# Patient Record
Sex: FEMALE | Race: BLACK OR AFRICAN AMERICAN | Employment: OTHER | URBAN - METROPOLITAN AREA
[De-identification: names, ages, dates, MRNs, and addresses within clinical notes are randomized per-mention and may not be internally consistent; named-entity substitution may affect disease eponyms.]

---

## 2017-01-17 ENCOUNTER — OFFICE VISIT (OUTPATIENT)
Dept: INTERNAL MEDICINE CLINIC | Age: 82
End: 2017-01-17

## 2017-01-17 VITALS
HEIGHT: 62 IN | TEMPERATURE: 97.4 F | BODY MASS INDEX: 27.33 KG/M2 | WEIGHT: 148.5 LBS | OXYGEN SATURATION: 98 % | SYSTOLIC BLOOD PRESSURE: 102 MMHG | HEART RATE: 92 BPM | DIASTOLIC BLOOD PRESSURE: 58 MMHG | RESPIRATION RATE: 20 BRPM

## 2017-01-17 DIAGNOSIS — J44.9 CHRONIC OBSTRUCTIVE PULMONARY DISEASE, UNSPECIFIED COPD TYPE (HCC): ICD-10-CM

## 2017-01-17 DIAGNOSIS — I63.30 CEREBRAL THROMBOSIS WITH CEREBRAL INFARCTION (HCC): ICD-10-CM

## 2017-01-17 DIAGNOSIS — F41.1 GAD (GENERALIZED ANXIETY DISORDER): Primary | ICD-10-CM

## 2017-01-17 DIAGNOSIS — G10 HEREDITARY CHOREA (HCC): ICD-10-CM

## 2017-01-17 RX ORDER — ASPIRIN 81 MG/1
81 TABLET ORAL DAILY
Qty: 30 TAB | Refills: 12 | Status: SHIPPED | OUTPATIENT
Start: 2017-01-17

## 2017-01-17 RX ORDER — CITALOPRAM 10 MG/1
10 TABLET ORAL DAILY
Qty: 30 TAB | Refills: 3
Start: 2017-01-17 | End: 2017-05-30 | Stop reason: DRUGHIGH

## 2017-01-17 NOTE — LETTER
1/17/2017 1:43 PM 
 
Ms. Sheila Barnhartata [address] [facility] [phone] To Whom It May Concern Sheila Rivera was unable to travel during the period of 12/21/2016 thru 12/28/2016 due to her recent hospitalization and illness. Sincerely, Krystal Ruvalcaba MD

## 2017-01-17 NOTE — PROGRESS NOTES
Health Maintenance Due   Topic Date Due    DTaP/Tdap/Td series (1 - Tdap) 10/21/1951    Pneumococcal 65+ Low/Medium Risk (2 of 2 - PPSV23) 06/16/2011       Chief Complaint   Patient presents with    Follow-up     2 month   [address] for UTI    Cholesterol Problem    COPD    Tremors    Fatigue     over a period of time per spouse       1. Have you been to the ER, urgent care clinic since your last visit? Hospitalized since your last visit? Yes When: 11/2016 Where: [facility]) Reason for visit: UTI    2. Have you seen or consulted any other health care providers outside of the [facility] since your last visit? Include any pap smears or colon screening. No    3) Do you have an Advance Directive on file? meagan      4) Are you interested in receiving information on Advance Directives? NO      Patient is accompanied by daughter I have received verbal consent from Rik Capone to discuss any/all medical information while they are present in the room.

## 2017-01-17 NOTE — PATIENT INSTRUCTIONS

## 2017-01-17 NOTE — MR AVS SNAPSHOT
Visit Information Date & Time Provider Department Dept. Phone Encounter #  
 1/17/2017  1:15 PM Elayne Soulier, MD [facility] Internal Medicine [employer] [id number] Your Appointments 2/2/2017  3:40 PM  
Follow Up with Stephen Solis DO [facility] at [address] [facility]) Appt Note: 6 week f/u abnormal movements $0CP OCTAVIO 12/6/16  
 [address] [place] [address] [facility] [address]  
[phone]  
  
   
 [address] [facility]  [address] Upcoming Health Maintenance Date Due DTaP/Tdap/Td series (1 - Tdap) 10/21/1951 Pneumococcal 65+ Low/Medium Risk (2 of 2 - PPSV23) 6/16/2011 GLAUCOMA SCREENING Q2Y 4/9/2017 MEDICARE YEARLY EXAM 4/22/2017 Allergies as of 1/17/2017  Review Complete On: 1/17/2017 By: Elayne Soulier, MD  
  
 Severity Noted Reaction Type Reactions Keflex [Cephalexin]  06/16/2011   Topical Rash Current Immunizations  Reviewed on 6/16/2011 Name Date Influenza Vaccine Split 9/16/2010 Pneumococcal Vaccine (Unspecified Type) 6/16/2006 Not reviewed this visit You Were Diagnosed With   
  
 Codes Comments HANNA (generalized anxiety disorder)    -  Primary ICD-10-CM: F41.1 ICD-9-CM: 300.02 Hereditary chorea (HCC)     ICD-10-CM: G10 
ICD-9-CM: 333.4 Chronic obstructive pulmonary disease, unspecified COPD type ([facility]     ICD-10-CM: J44.9 ICD-9-CM: 635 Cerebral thrombosis with cerebral infarction [facility])     ICD-10-CM: I63.30 ICD-9-CM: 434.01 Vitals BP Pulse Temp Resp Height(growth percentile) Weight(growth percentile) 102/58 (BP 1 Location: Left arm, BP Patient Position: Sitting) 92 97.4 °F (36.3 °C) (Oral) 20 5' 2\" (1.575 m) 148 lb 8 oz (67.4 kg) SpO2 BMI OB Status Smoking Status 98% 27.16 kg/m2 Postmenopausal Former Smoker Vitals History BMI and BSA Data Body Mass Index Body Surface Area 27.16 kg/m 2 1.72 m 2 Preferred Pharmacy Pharmacy Name Phone [facility]. [phone] Your Updated Medication List  
  
   
This list is accurate as of: 1/17/17  1:56 PM.  Always use your most recent med list.  
  
  
  
  
 aspirin delayed-release 81 mg tablet Commonly known as:  ECOTRIN LOW STRENGTH Take 1 Tab by mouth daily. calcium citrate 200 mg (950 mg) tablet Take  by mouth daily. calcium citrate-vitamin d3 315-200 mg-unit Tab Commonly known as:  CITRACAL+D Take 1 Tab by mouth daily (with breakfast). citalopram 10 mg tablet Commonly known as:  Hussein Narrow Take 1 Tab by mouth daily. clonazePAM 0.5 mg tablet Commonly known as:  Kayla Poornima Take 1 Tab by mouth two (2) times daily as needed. Max Daily Amount: 1 mg. Prescriptions Sent to Pharmacy Refills  
 aspirin delayed-release (ECOTRIN LOW STRENGTH) 81 mg tablet 12 Sig: Take 1 Tab by mouth daily. Class: Normal  
 Pharmacy: [address]  #: [phone] Route: Oral  
  
To-Do List   
 01/26/2017 4:00 PM  
  Appointment with [facility] 1 at [address] [place] [address] ([phone]) Please, no calcium supplements or antacids that coat the stomach (ex: Tums, Mylanta) 24 hours prior to procedure. Maintain normal diet and medications. Dairy products are allowed. Wear an outfit with an elastic waistband (no zipper or metal snaps). Check in at registration 15min before your appointment time unless you were instructed to do otherwise. Patient Instructions Anxiety Disorder: Care Instructions Your Care Instructions Anxiety is a normal reaction to stress. Difficult situations can cause you to have symptoms such as sweaty palms and a nervous feeling. In an anxiety disorder, the symptoms are far more severe.  Constant worry, muscle tension, trouble sleeping, nausea and diarrhea, and other symptoms can make normal daily activities difficult or impossible. These symptoms may occur for no reason, and they can affect your work, school, or social life. Medicines, counseling, and self-care can all help. Follow-up care is a key part of your treatment and safety. Be sure to make and go to all appointments, and call your doctor if you are having problems. It's also a good idea to know your test results and keep a list of the medicines you take. How can you care for yourself at home? · Take medicines exactly as directed. Call your doctor if you think you are having a problem with your medicine. · Go to your counseling sessions and follow-up appointments. · Recognize and accept your anxiety. Then, when you are in a situation that makes you anxious, say to yourself, \"This is not an emergency. I feel uncomfortable, but I am not in danger. I can keep going even if I feel anxious. \" · Be kind to your body: ¨ Relieve tension with exercise or a massage. ¨ Get enough rest. 
¨ Avoid alcohol, caffeine, nicotine, and illegal drugs. They can increase your anxiety level and cause sleep problems. ¨ Learn and do relaxation techniques. See below for more about these techniques. · Engage your mind. Get out and do something you enjoy. Go to a funny movie, or take a walk or hike. Plan your day. Having too much or too little to do can make you anxious. · Keep a record of your symptoms. Discuss your fears with a good friend or family member, or join a support group for people with similar problems. Talking to others sometimes relieves stress. · Get involved in social groups, or volunteer to help others. Being alone sometimes makes things seem worse than they are. · Get at least 30 minutes of exercise on most days of the week to relieve stress. Walking is a good choice.  You also may want to do other activities, such as running, swimming, cycling, or playing tennis or team sports. Relaxation techniques Do relaxation exercises 10 to 20 minutes a day. You can play soothing, relaxing music while you do them, if you wish. · Tell others in your house that you are going to do your relaxation exercises. Ask them not to disturb you. · Find a comfortable place, away from all distractions and noise. · Lie down on your back, or sit with your back straight. · Focus on your breathing. Make it slow and steady. · Breathe in through your nose. Breathe out through either your nose or mouth. · Breathe deeply, filling up the area between your navel and your rib cage. Breathe so that your belly goes up and down. · Do not hold your breath. · Breathe like this for 5 to 10 minutes. Notice the feeling of calmness throughout your whole body. As you continue to breathe slowly and deeply, relax by doing the following for another 5 to 10 minutes: · Tighten and relax each muscle group in your body. You can begin at your toes and work your way up to your head. · Imagine your muscle groups relaxing and becoming heavy. · Empty your mind of all thoughts. · Let yourself relax more and more deeply. · Become aware of the state of calmness that surrounds you. · When your relaxation time is over, you can bring yourself back to alertness by moving your fingers and toes and then your hands and feet and then stretching and moving your entire body. Sometimes people fall asleep during relaxation, but they usually wake up shortly afterward. · Always give yourself time to return to full alertness before you drive a car or do anything that might cause an accident if you are not fully alert. Never play a relaxation tape while you drive a car. When should you call for help? Call 911 anytime you think you may need emergency care. For example, call if: 
· You feel you cannot stop from hurting yourself or someone else. Keep the numbers for these national suicide hotlines: 0-463-658-TALK (6-513.558.7259) and 7-470-BHWZAMH (4-698.440.6279). If you or someone you know talks about suicide or feeling hopeless, get help right away. Watch closely for changes in your health, and be sure to contact your doctor if: 
· You have anxiety or fear that affects your life. · You have symptoms of anxiety that are new or different from those you had before. Where can you learn more? Go to http://izabella-dylan.info/. Enter P754 in the search box to learn more about \"Anxiety Disorder: Care Instructions. \" Current as of: July 26, 2016 Content Version: 11.1 © 2608-7907 [employer]. Care instructions adapted under license by [employer] (which disclaims liability or warranty for this information). If you have questions about a medical condition or this instruction, always ask your healthcare professional. [place] [address] any warranty or liability for your use of this information. Introducing [facility] [place] & HEALTH SERVICES! [facility] introduces [employer] patient portal. Now you can access parts of your medical record, email your doctor's office, and request medication refills online. 1. In your internet browser, go to [email or web address] 2. Click on the First Time User? Click Here link in the Sign In box. You will see the New Member Sign Up page. 3. Enter your [employer] Access Code exactly as it appears below. You will not need to use this code after youve completed the sign-up process. If you do not sign up before the expiration date, you must request a new code. · [employer] Access Code: [email or web address] Expires: 1/25/2017  2:41 PM 
 
4. Enter the last four digits of your Social Security Number (xxxx) and Date of Birth (mm/dd/yyyy) as indicated and click Submit. You will be taken to the next sign-up page. 5. Create a [employer] ID. This will be your [employer] login ID and cannot be changed, so think of one that is secure and easy to remember. 6. Create a [employer] password. You can change your password at any time. 7. Enter your Password Reset Question and Answer. This can be used at a later time if you forget your password. 8. Enter your e-mail address. You will receive e-mail notification when new information is available in [address] 9. Click Sign Up. You can now view and download portions of your medical record. 10. Click the Download Summary menu link to download a portable copy of your medical information. If you have questions, please visit the Frequently Asked Questions section of the [employer] website. Remember, [employer] is NOT to be used for urgent needs. For medical emergencies, dial 911. Now available from your iPhone and Android! Please provide this summary of care documentation to your next provider. Your primary care clinician is listed as Sigrid Del Rosario. If you have any questions after today's visit, please call [phone].

## 2017-01-17 NOTE — PROGRESS NOTES
HISTORY OF PRESENT ILLNESS  Mikey Louis is a 80 y.o. female here accompanied with her  and daughter. Still remains anxious. did not start taking celexa because of possible side effects. She has seen neurologist,was diagnosed with chorea and was adv to have klonopin. She is not taking it either. Had stroke in past.not taking ASA. all albs reviewed. She is post menopausal.not on calcium. Follow-up     Hospital Follow Up     Cholesterol Problem     COPD     Tremors     Fatigue         Review of Systems   Constitutional: Positive for fatigue. HENT: Negative. Eyes: Negative. Respiratory: Negative. Cardiovascular: Negative. Gastrointestinal: Negative. Musculoskeletal: Negative. Skin: Negative. Neurological: Positive for tremors. Negative for speech change, focal weakness, seizures and loss of consciousness. Psychiatric/Behavioral: The patient is nervous/anxious. Physical Exam   Constitutional: She appears well-developed and well-nourished. No distress. Neck: Normal range of motion. Neck supple. No JVD present. No thyromegaly present. Cardiovascular: Normal rate, regular rhythm, normal heart sounds and intact distal pulses. Pulmonary/Chest: Effort normal and breath sounds normal. No respiratory distress. She has no wheezes. She has no rales. Psychiatric: Her behavior is normal. Judgment and thought content normal.   Anxious profoundly       ASSESSMENT and Melba Berman was seen today for follow-up, hospital follow up, cholesterol problem, copd, tremors and fatigue. Diagnoses and all orders for this visit:    HANNA (generalized anxiety disorder)    Did not start taking celexa. Reassured and adv her to start taking it. Will order,  -     citalopram (CELEXA) 10 mg tablet; Take 1 Tab by mouth daily. Hereditary chorea ([facility]    Has seen . was adv to take klonopin. She is not taking it.     Chronic obstructive pulmonary disease, unspecified COPD type ([address]    Stable. off inhaler. Cerebral thrombosis with cerebral infarction ([address]    Will start,  -     aspirin delayed-release (ECOTRIN LOW STRENGTH) 81 mg tablet; Take 1 Tab by mouth daily. Discussed expected course/resolution/complications of diagnosis in detail with patient. Medication risks/benefits/costs/interactions/alternatives discussed with patient. Pt was given an after visit summary which includes diagnoses, current medications & vitals. Pt expressed understanding with the diagnosis and plan.

## 2017-01-18 ENCOUNTER — TELEPHONE (OUTPATIENT)
Dept: INTERNAL MEDICINE CLINIC | Age: 82
End: 2017-01-18

## 2017-01-18 NOTE — TELEPHONE ENCOUNTER
Patient  Christell Heron called has a question about a test that his wife need to get done he can be reached at   [phone]

## 2017-01-26 ENCOUNTER — HOSPITAL ENCOUNTER (OUTPATIENT)
Dept: MAMMOGRAPHY | Age: 82
Discharge: HOME | End: 2017-01-26
Attending: INTERNAL MEDICINE
Payer: MEDICARE

## 2017-01-26 DIAGNOSIS — Z78.0 POST-MENOPAUSE: ICD-10-CM

## 2017-01-26 PROCEDURE — 77080 DXA BONE DENSITY AXIAL: CPT

## 2017-02-02 NOTE — PROGRESS NOTES
Osteopenia. Adv to have caltrate,oscal or citracal  600 mg po BID and have more milk product in diet.

## 2017-03-21 ENCOUNTER — OFFICE VISIT (OUTPATIENT)
Dept: INTERNAL MEDICINE CLINIC | Age: 82
End: 2017-03-21

## 2017-03-21 VITALS
WEIGHT: 146 LBS | DIASTOLIC BLOOD PRESSURE: 54 MMHG | RESPIRATION RATE: 20 BRPM | TEMPERATURE: 98.2 F | HEART RATE: 93 BPM | OXYGEN SATURATION: 94 % | SYSTOLIC BLOOD PRESSURE: 100 MMHG | HEIGHT: 62 IN | BODY MASS INDEX: 26.87 KG/M2

## 2017-03-21 DIAGNOSIS — F41.9 ANXIETY DISORDER, UNSPECIFIED TYPE: ICD-10-CM

## 2017-03-21 DIAGNOSIS — E55.9 VITAMIN D DEFICIENCY: ICD-10-CM

## 2017-03-21 DIAGNOSIS — I63.30 CEREBRAL THROMBOSIS WITH CEREBRAL INFARCTION (HCC): ICD-10-CM

## 2017-03-21 DIAGNOSIS — G10 HEREDITARY CHOREA (HCC): Primary | ICD-10-CM

## 2017-03-21 DIAGNOSIS — M85.80 OSTEOPENIA: ICD-10-CM

## 2017-03-21 RX ORDER — CLONAZEPAM 0.5 MG/1
0.5 TABLET ORAL
Qty: 60 TAB | Refills: 2 | Status: SHIPPED | OUTPATIENT
Start: 2017-03-21 | End: 2017-05-30 | Stop reason: SDUPTHER

## 2017-03-21 NOTE — MR AVS SNAPSHOT
Visit Information Date & Time Provider Department Dept. Phone Encounter #  
 3/21/2017  1:15 PM Ximena Whiteside MD [facility] Internal Medicine [facility] [phone] [id number] Upcoming Health Maintenance Date Due DTaP/Tdap/Td series (1 - Tdap) 10/21/1951 Pneumococcal 65+ Low/Medium Risk (2 of 2 - PPSV23) 6/16/2011 GLAUCOMA SCREENING Q2Y 4/9/2017 MEDICARE YEARLY EXAM 4/22/2017 Allergies as of 3/21/2017  Review Complete On: 3/21/2017 By: Ximena Whiteside MD  
  
 Severity Noted Reaction Type Reactions Keflex [Cephalexin]  06/16/2011   Topical Rash Current Immunizations  Reviewed on 6/16/2011 Name Date Influenza Vaccine Split 9/16/2010 Pneumococcal Vaccine (Unspecified Type) 6/16/2006 Not reviewed this visit You Were Diagnosed With   
  
 Codes Comments Hereditary chorea ([facility]    -  Primary ICD-10-CM: G10 
ICD-9-CM: 333.4 Cerebral thrombosis with cerebral infarction [facility])     ICD-10-CM: I63.30 ICD-9-CM: 434.01 Anxiety disorder, unspecified type     ICD-10-CM: F41.9 ICD-9-CM: 300.00 Vitamin D deficiency     ICD-10-CM: E55.9 ICD-9-CM: 268.9 Osteopenia     ICD-10-CM: M85.80 ICD-9-CM: 733.90 Vitals BP Pulse Temp Resp Height(growth percentile) Weight(growth percentile) 100/54 (BP 1 Location: Left arm, BP Patient Position: Sitting) 93 98.2 °F (36.8 °C) (Oral) 20 5' 2\" (1.575 m) 146 lb (66.2 kg) SpO2 BMI OB Status Smoking Status 94% 26.7 kg/m2 Postmenopausal Former Smoker Vitals History BMI and BSA Data Body Mass Index Body Surface Area  
 26.7 kg/m 2 1.7 m 2 Preferred Pharmacy Pharmacy Name Phone [facility]. [phone] Your Updated Medication List  
  
   
This list is accurate as of: 3/21/17  1:53 PM.  Always use your most recent med list.  
  
  
  
  
 aspirin delayed-release 81 mg tablet Commonly known as:  ECOTRIN LOW STRENGTH Take 1 Tab by mouth daily. calcium citrate 200 mg (950 mg) tablet Take  by mouth daily. calcium citrate-vitamin d3 315-200 mg-unit Tab Commonly known as:  CITRACAL+D Take 1 Tab by mouth daily (with breakfast). citalopram 10 mg tablet Commonly known as:  Elda Moer Take 1 Tab by mouth daily. clonazePAM 0.5 mg tablet Commonly known as:  Bertram Broqueta Take 1 Tab by mouth two (2) times daily as needed. Max Daily Amount: 1 mg. Prescriptions Printed Refills  
 clonazePAM (KLONOPIN) 0.5 mg tablet 2 Sig: Take 1 Tab by mouth two (2) times daily as needed. Max Daily Amount: 1 mg. Class: Print Route: Oral  
  
We Performed the Following CBC WITH AUTOMATED DIFF [44324 CPT(R)] METABOLIC PANEL, COMPREHENSIVE [62007 CPT(R)] VITAMIN D, 25 HYDROXY [id number] CPT(R)] Introducing [facility] [place] & HEALTH SERVICES! Kelle Hoffmann introduces [employer] patient portal. Now you can access parts of your medical record, email your doctor's office, and request medication refills online. 1. In your internet browser, go to [email or web address] 2. Click on the First Time User? Click Here link in the Sign In box. You will see the New Member Sign Up page. 3. Enter your [employer] Access Code exactly as it appears below. You will not need to use this code after youve completed the sign-up process. If you do not sign up before the expiration date, you must request a new code. · [employer] Access Code: [email or web address] Expires: 4/26/2017  4:38 PM 
 
4. Enter the last four digits of your Social Security Number (xxxx) and Date of Birth (mm/dd/yyyy) as indicated and click Submit. You will be taken to the next sign-up page. 5. Create a 5i Sciencest ID. This will be your [employer] login ID and cannot be changed, so think of one that is secure and easy to remember. 6. Create a 5i [employer]est password. You can change your password at any time. 7. Enter your Password Reset Question and Answer. This can be used at a later time if you forget your password. 8. Enter your e-mail address. You will receive e-mail notification when new information is available in [address] 9. Click Sign Up. You can now view and download portions of your medical record. 10. Click the Download Summary menu link to download a portable copy of your medical information. If you have questions, please visit the Frequently Asked Questions section of the [employer] website. Remember, [employer] is NOT to be used for urgent needs. For medical emergencies, dial 911. Now available from your iPhone and Android! Please provide this summary of care documentation to your next provider. Your primary care clinician is listed as Geovanny Ba. If you have any questions after today's visit, please call [phone].

## 2017-03-21 NOTE — PROGRESS NOTES
HISTORY OF PRESENT ILLNESS  Sheila Rivera is a 80 y.o. female here accompanied with her  and daughter. Her chorea is worse. was prescribed klonopin,not taking it. Anxiety is better controlled. on celexa. She is post menopausal. on calcium. dexa scan shows osteopenia. Need lab work. Follow-up     Malaise     Tremors     COPD     Cholesterol Problem         Review of Systems   Constitutional: Negative. HENT: Negative. Eyes: Negative. Respiratory: Negative. Cardiovascular: Negative. Gastrointestinal: Negative. Musculoskeletal: Negative. Skin: Negative. Neurological: Positive for tremors. Negative for speech change, focal weakness, seizures and loss of consciousness. Psychiatric/Behavioral: The patient is nervous/anxious. Physical Exam   Constitutional: She appears well-developed and well-nourished. No distress. HENT:   Head: Normocephalic and atraumatic. Right Ear: External ear normal.   Left Ear: External ear normal.   Nose: Nose normal.   Mouth/Throat: Oropharynx is clear and moist. No oropharyngeal exudate. Neck: Normal range of motion. Neck supple. No JVD present. No thyromegaly present. Cardiovascular: Normal rate, regular rhythm, normal heart sounds and intact distal pulses. Pulmonary/Chest: Effort normal and breath sounds normal. No respiratory distress. She has no wheezes. She has no rales. Abdominal: Soft. Bowel sounds are normal. She exhibits no distension. There is no tenderness. Neurological: She is alert. Coordination abnormal.   Abnormal movement or arms and legs. No shuffling gait,no intention tremor. Psychiatric: Her behavior is normal. Judgment and thought content normal.   Anxiety is better. ASSESSMENT and PLAN  Tulio Ruaon was seen today for follow-up, malaise, tremors, copd and cholesterol problem. Diagnoses and all orders for this visit:    Hereditary chorea ([address]  Worse.   Did not try med.will call in,  -     clonazePAM [facility] 0.5 mg tablet; Take 1 Tab by mouth two (2) times daily as needed. Max Daily Amount: 1 mg. Cerebral thrombosis with cerebral infarction (HCC)    On asa.  -     CBC WITH AUTOMATED DIFF  -     METABOLIC PANEL, COMPREHENSIVE    Anxiety disorder, unspecified type    celexa is helping her. Vitamin D deficiency  -     VITAMIN D, 25 HYDROXY    Osteopenia    On calcium. dexa scan show osteopenia. Discussed expected course/resolution/complications of diagnosis in detail with patient. Medication risks/benefits/costs/interactions/alternatives discussed with patient. Pt was given an after visit summary which includes diagnoses, current medications & vitals. Pt expressed understanding with the diagnosis and plan.

## 2017-03-21 NOTE — PROGRESS NOTES
Health Maintenance Due   Topic Date Due    DTaP/Tdap/Td series (1 - Tdap) 10/21/1951    Pneumococcal 65+ Low/Medium Risk (2 of 2 - PPSV23) 06/16/2011    GLAUCOMA SCREENING Q2Y  04/09/2017       Chief Complaint   Patient presents with    Follow-up     2 month    Malaise     states no energy, having episodes of dizziness with 1 fall    Tremors    COPD    Cholesterol Problem       1. Have you been to the ER, urgent care clinic since your last visit? Hospitalized since your last visit? No    2. Have you seen or consulted any other health care providers outside of the [address] [place] [address] since your last visit? Include any pap smears or colon screening. No    3) Do you have an Advance Directive on file? no    4) Are you interested in receiving information on Advance Directives? NO      Patient is accompanied by self I have received verbal consent from Abbey Escobedo to discuss any/all medical information while they are present in the room.

## 2017-03-21 NOTE — LETTER
3/28/2017 12:41 PM 
 
Ms. Angie Carvalho [address] [id number] Dear Angie Manuel: Please find your most recent results below. Resulted Orders CBC WITH AUTOMATED DIFF Result Value Ref Range WBC 7.6 3.4 - 10.8 x10E3/uL  
 RBC 4.39 3.77 - 5.28 x10E6/uL HGB 12.4 11.1 - 15.9 g/dL HCT 38.1 34.0 - 46.6 % MCV 87 79 - 97 fL  
 MCH 28.2 26.6 - 33.0 pg  
 MCHC 32.5 31.5 - 35.7 g/dL  
 RDW 15.8 (H) 12.3 - 15.4 % PLATELET 303 171 - 865 x10E3/uL NEUTROPHILS 67 % Lymphocytes 21 % MONOCYTES 11 % EOSINOPHILS 1 % BASOPHILS 0 %  
 ABS. NEUTROPHILS 5.1 1.4 - 7.0 x10E3/uL Abs Lymphocytes 1.6 0.7 - 3.1 x10E3/uL  
 ABS. MONOCYTES 0.8 0.1 - 0.9 x10E3/uL  
 ABS. EOSINOPHILS 0.1 0.0 - 0.4 x10E3/uL  
 ABS. BASOPHILS 0.0 0.0 - 0.2 x10E3/uL IMMATURE GRANULOCYTES 0 %  
 ABS. IMM. GRANS. 0.0 0.0 - 0.1 x10E3/uL Narrative Performed at:  [address]  [id number] : Beatris Cam MD, Phone:  [phone] VITAMIN D, 25 HYDROXY Result Value Ref Range VITAMIN D, 25-HYDROXY 21.7 (L) 30.0 - 100.0 ng/mL Comment:  
   Vitamin D deficiency has been defined by the [facility] practice guideline as a 
level of serum 25-OH vitamin D less than 20 ng/mL (1,2). The Endocrine Society went on to further define vitamin D 
insufficiency as a level between 21 and 29 ng/mL (2). 1. IOM ([place] of [employer]). 2010. Dietary reference 
   intakes for calcium and D. [facility]: The 
   Presentigo. 2. Bong MF, Misti NC, John CRANE, et al. 
   Evaluation, treatment, and prevention of vitamin D 
   deficiency: an Endocrine Society clinical practice 
   guideline. JCEM. 2011 Jul; 96(7):1911-30. Narrative Performed at:  [address]  [id number] : Beatris Cam MD, Phone:  [phone] METABOLIC PANEL, COMPREHENSIVE Result Value Ref Range Glucose 97 65 - 99 mg/dL BUN 17 8 - 27 mg/dL Creatinine 0.88 0.57 - 1.00 mg/dL GFR est non-AA 60 >59 mL/min/1.73 GFR est AA 69 >59 mL/min/1.73  
 BUN/Creatinine ratio 19 11 - 26 Sodium 139 134 - 144 mmol/L Potassium 4.5 3.5 - 5.2 mmol/L Chloride 101 96 - 106 mmol/L  
 CO2 23 18 - 29 mmol/L Calcium 8.8 8.7 - 10.3 mg/dL Protein, total 6.3 6.0 - 8.5 g/dL Albumin 3.8 3.5 - 4.7 g/dL GLOBULIN, TOTAL 2.5 1.5 - 4.5 g/dL A-G Ratio 1.5 1.2 - 2.2 Comment: **Please note reference interval change** Bilirubin, total 0.5 0.0 - 1.2 mg/dL Alk. phosphatase 70 39 - 117 IU/L  
 AST (SGOT) 11 0 - 40 IU/L  
 ALT (SGPT) 7 0 - 32 IU/L Narrative Performed at:  [address]  [id number] : Berta Duval MD, Phone:  [phone] RECOMMENDATIONS: 
Pepper Reil your labs indicate that Your Vitamin D level is low, start taking OTC Vitamin D 1000 units 1 x a day for 4 months. Also increase your consumption of milk and exposure to the sun for 20 minutes a day. We will recheck your Vitamin D level in 4 months All other labs are stable Please call me if you have any questions: [phone] Sincerely, Yuni White MD

## 2017-03-22 LAB
25(OH)D3+25(OH)D2 SERPL-MCNC: 21.7 NG/ML (ref 30–100)
ALBUMIN SERPL-MCNC: 3.8 G/DL (ref 3.5–4.7)
ALBUMIN/GLOB SERPL: 1.5 {RATIO} (ref 1.2–2.2)
ALP SERPL-CCNC: 70 IU/L (ref 39–117)
ALT SERPL-CCNC: 7 IU/L (ref 0–32)
AST SERPL-CCNC: 11 IU/L (ref 0–40)
BASOPHILS # BLD AUTO: 0 X10E3/UL (ref 0–0.2)
BASOPHILS NFR BLD AUTO: 0 %
BILIRUB SERPL-MCNC: 0.5 MG/DL (ref 0–1.2)
BUN SERPL-MCNC: 17 MG/DL (ref 8–27)
BUN/CREAT SERPL: 19 (ref 11–26)
CALCIUM SERPL-MCNC: 8.8 MG/DL (ref 8.7–10.3)
CHLORIDE SERPL-SCNC: 101 MMOL/L (ref 96–106)
CO2 SERPL-SCNC: 23 MMOL/L (ref 18–29)
CREAT SERPL-MCNC: 0.88 MG/DL (ref 0.57–1)
EOSINOPHIL # BLD AUTO: 0.1 X10E3/UL (ref 0–0.4)
EOSINOPHIL NFR BLD AUTO: 1 %
ERYTHROCYTE [DISTWIDTH] IN BLOOD BY AUTOMATED COUNT: 15.8 % (ref 12.3–15.4)
GLOBULIN SER CALC-MCNC: 2.5 G/DL (ref 1.5–4.5)
GLUCOSE SERPL-MCNC: 97 MG/DL (ref 65–99)
HCT VFR BLD AUTO: 38.1 % (ref 34–46.6)
HGB BLD-MCNC: 12.4 G/DL (ref 11.1–15.9)
IMM GRANULOCYTES # BLD: 0 X10E3/UL (ref 0–0.1)
IMM GRANULOCYTES NFR BLD: 0 %
LYMPHOCYTES # BLD AUTO: 1.6 X10E3/UL (ref 0.7–3.1)
LYMPHOCYTES NFR BLD AUTO: 21 %
MCH RBC QN AUTO: 28.2 PG (ref 26.6–33)
MCHC RBC AUTO-ENTMCNC: 32.5 G/DL (ref 31.5–35.7)
MCV RBC AUTO: 87 FL (ref 79–97)
MONOCYTES # BLD AUTO: 0.8 X10E3/UL (ref 0.1–0.9)
MONOCYTES NFR BLD AUTO: 11 %
NEUTROPHILS # BLD AUTO: 5.1 X10E3/UL (ref 1.4–7)
NEUTROPHILS NFR BLD AUTO: 67 %
PLATELET # BLD AUTO: 249 X10E3/UL (ref 150–379)
POTASSIUM SERPL-SCNC: 4.5 MMOL/L (ref 3.5–5.2)
PROT SERPL-MCNC: 6.3 G/DL (ref 6–8.5)
RBC # BLD AUTO: 4.39 X10E6/UL (ref 3.77–5.28)
SODIUM SERPL-SCNC: 139 MMOL/L (ref 134–144)
WBC # BLD AUTO: 7.6 X10E3/UL (ref 3.4–10.8)

## 2017-04-07 ENCOUNTER — OFFICE VISIT (OUTPATIENT)
Dept: INTERNAL MEDICINE CLINIC | Age: 82
End: 2017-04-07

## 2017-04-07 VITALS
OXYGEN SATURATION: 93 % | HEIGHT: 62 IN | RESPIRATION RATE: 24 BRPM | TEMPERATURE: 95.9 F | SYSTOLIC BLOOD PRESSURE: 112 MMHG | DIASTOLIC BLOOD PRESSURE: 65 MMHG | BODY MASS INDEX: 27.49 KG/M2 | HEART RATE: 100 BPM | WEIGHT: 149.4 LBS

## 2017-04-07 DIAGNOSIS — G25.5 CHOREA: Primary | ICD-10-CM
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
## 2017-04-07 DIAGNOSIS — N39.0 URINARY TRACT INFECTION WITHOUT HEMATURIA, SITE UNSPECIFIED: ICD-10-CM

## 2017-04-07 LAB
BILIRUB UR QL STRIP: NEGATIVE
GLUCOSE UR-MCNC: NEGATIVE MG/DL
KETONES P FAST UR STRIP-MCNC: NEGATIVE MG/DL
PH UR STRIP: 5 [PH] (ref 4.6–8)
PROT UR QL STRIP: NEGATIVE MG/DL
SP GR UR STRIP: 1.03 (ref 1–1.03)
UA UROBILINOGEN AMB POC: NORMAL (ref 0.2–1)
URINALYSIS CLARITY POC: NORMAL
URINALYSIS COLOR POC: NORMAL
URINE BLOOD POC: NEGATIVE
URINE LEUKOCYTES POC: NEGATIVE
URINE NITRITES POC: NEGATIVE

## 2017-04-07 RX ORDER — SULFAMETHOXAZOLE AND TRIMETHOPRIM 400; 80 MG/1; MG/1
1 TABLET ORAL 2 TIMES DAILY
Qty: 14 TAB | Refills: 0 | Status: SHIPPED | OUTPATIENT
Start: 2017-04-07 | End: 2017-05-30

## 2017-04-07 NOTE — PATIENT INSTRUCTIONS
Urinary Tract Infection in Women: Care Instructions  Your Care Instructions    A urinary tract infection, or UTI, is a general term for an infection anywhere between the kidneys and the urethra (where urine comes out). Most UTIs are bladder infections. They often cause pain or burning when you urinate. UTIs are caused by bacteria and can be cured with antibiotics. Be sure to complete your treatment so that the infection goes away. Follow-up care is a key part of your treatment and safety. Be sure to make and go to all appointments, and call your doctor if you are having problems. It's also a good idea to know your test results and keep a list of the medicines you take. How can you care for yourself at home? · Take your antibiotics as directed. Do not stop taking them just because you feel better. You need to take the full course of antibiotics. · Drink extra water and other fluids for the next day or two. This may help wash out the bacteria that are causing the infection. (If you have kidney, heart, or liver disease and have to limit fluids, talk with your doctor before you increase your fluid intake.)  · Avoid drinks that are carbonated or have caffeine. They can irritate the bladder. · Urinate often. Try to empty your bladder each time. · To relieve pain, take a hot bath or lay a heating pad set on low over your lower belly or genital area. Never go to sleep with a heating pad in place. To prevent UTIs  · Drink plenty of water each day. This helps you urinate often, which clears bacteria from your system. (If you have kidney, heart, or liver disease and have to limit fluids, talk with your doctor before you increase your fluid intake.)  · Urinate when you need to. · Urinate right after you have sex. · Change sanitary pads often. · Avoid douches, bubble baths, feminine hygiene sprays, and other feminine hygiene products that have deodorants.   · After going to the bathroom, wipe from front to back.  When should you call for help? Call your doctor now or seek immediate medical care if:  · Symptoms such as fever, chills, nausea, or vomiting get worse or appear for the first time. · You have new pain in your back just below your rib cage. This is called flank pain. · There is new blood or pus in your urine. · You have any problems with your antibiotic medicine. Watch closely for changes in your health, and be sure to contact your doctor if:  · You are not getting better after taking an antibiotic for 2 days. · Your symptoms go away but then come back. Where can you learn more? Go to http://izabella-dylan.info/. Enter U761 in the search box to learn more about \"Urinary Tract Infection in Women: Care Instructions. \"  Current as of: November 28, 2016  Content Version: 11.2  © 4971-0075 [employer]. Care instructions adapted under license by [employer] (which disclaims liability or warranty for this information). If you have questions about a medical condition or this instruction, always ask your healthcare professional. [address] any warranty or liability for your use of this information.

## 2017-04-07 NOTE — PROGRESS NOTES
HISTORY OF PRESENT ILLNESS  Cynthia Wiley is a 80 y.o. female. This is a patient of Dr. Anayeli Nguyen who presents today with her  with complaints of falls. The patient states she was previously seen by neurology related to chorea. She was recommended to take Clonazepam 0.5 mg bid. Patient states she did not initially start this medication due to concern over possible side effects. However, following visit with Dr. Anayeli Nguyen, she was again recommended to try the medication. Patient states she took 1/2 tab bid x 1 week. Medication did seem to help with chorea markedly; however, she experienced nearly daily falls in which she felt as though her legs \"came out from under\" her. She denies injury with falls. The patient states she did not take the medication x 2 days and has not had falls during that time period. The patient denies chest pain, shortness of breath, dizziness, fatigue, and GI/ problems. Visit Vitals    /65 (BP 1 Location: Right arm, BP Patient Position: Sitting)    Pulse 100    Temp 95.9 °F (35.5 °C) (Oral)    Resp 24    Ht 5' 2.01\" (1.575 m)    Wt 149 lb 6.4 oz (67.8 kg)    SpO2 93%    BMI 27.32 kg/m2     HPI    Review of Systems   Constitutional: Negative for chills and fever. HENT: Negative for congestion. Eyes: Negative for blurred vision and pain. Respiratory: Negative for shortness of breath and wheezing. Cardiovascular: Negative for chest pain and leg swelling. Gastrointestinal: Negative for abdominal pain. Genitourinary: Negative. Musculoskeletal: Positive for falls. Skin: Negative. Neurological: Negative for dizziness, sensory change, speech change, focal weakness, seizures, loss of consciousness and headaches. Endo/Heme/Allergies: Negative. Psychiatric/Behavioral: Negative. Physical Exam   Constitutional: She is oriented to person, place, and time. She appears well-developed and well-nourished. No distress.    Ambulating with cane   HENT: Head: Normocephalic and atraumatic. Eyes: Conjunctivae are normal. Pupils are equal, round, and reactive to light. Neck: Neck supple. Cardiovascular: Normal rate, regular rhythm and normal heart sounds. Pulmonary/Chest: Effort normal and breath sounds normal. No respiratory distress. She has no wheezes. She has no rales. Musculoskeletal: She exhibits no edema. Neurological: She is alert and oriented to person, place, and time. Involuntary movement of legs bilaterally    Skin: Skin is warm and dry. Psychiatric: She has a normal mood and affect. Nursing note and vitals reviewed. ASSESSMENT and PLAN    ICD-10-CM ICD-9-CM    1. 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 Notify of any further falls. Discussed medication and possible side effects in detail. Do not take in combination with other medications/substances that can cause drowsiness. 2. Falls, initial encounter W19. Serge Mouse X819.7 Fall precautions. In office, AMB POC URINALYSIS DIP STICK AUTO W/ MICRO- large leukocytes   3. Urinary tract infection without hematuria, site unspecified N39.0 599.0 Will order  trimethoprim-sulfamethoxazole (BACTRIM, SEPTRA)  mg per tablet, 1 tab po bid x 3 days      Will order  CULTURE, URINE     Lab results and schedule of future lab studies reviewed with patient  Reviewed diet, exercise and weight control  Reviewed medications and side effects in detail  Patient encouraged to call or return to office if symptoms do not improve or worsen. Reviewed plan of care with patient who acknowledges understanding and agrees. Follow-up with Dr. Otto Holden in 2 weeks, or sooner as needed.

## 2017-05-22 RX ORDER — FLUTICASONE FUROATE AND VILANTEROL 100; 25 UG/1; UG/1
1 POWDER RESPIRATORY (INHALATION) DAILY
Qty: 1 INHALER | Refills: 5 | Status: SHIPPED | OUTPATIENT
Start: 2017-05-22 | End: 2018-02-13 | Stop reason: SDUPTHER

## 2017-05-30 ENCOUNTER — OFFICE VISIT (OUTPATIENT)
Dept: INTERNAL MEDICINE CLINIC | Age: 82
End: 2017-05-30

## 2017-05-30 VITALS
WEIGHT: 151.5 LBS | DIASTOLIC BLOOD PRESSURE: 70 MMHG | HEART RATE: 94 BPM | BODY MASS INDEX: 27.88 KG/M2 | OXYGEN SATURATION: 97 % | TEMPERATURE: 97.6 F | HEIGHT: 62 IN | RESPIRATION RATE: 20 BRPM | SYSTOLIC BLOOD PRESSURE: 120 MMHG

## 2017-05-30 DIAGNOSIS — G10 HEREDITARY CHOREA (HCC): ICD-10-CM

## 2017-05-30 DIAGNOSIS — G89.29 CHRONIC RIGHT SHOULDER PAIN: Primary | ICD-10-CM

## 2017-05-30 DIAGNOSIS — F41.9 ANXIETY: ICD-10-CM

## 2017-05-30 DIAGNOSIS — R26.9 GAIT ABNORMALITY: ICD-10-CM

## 2017-05-30 DIAGNOSIS — M25.511 CHRONIC RIGHT SHOULDER PAIN: Primary | ICD-10-CM

## 2017-05-30 RX ORDER — CITALOPRAM 20 MG/1
20 TABLET, FILM COATED ORAL DAILY
Qty: 30 TAB | Refills: 6 | Status: SHIPPED | OUTPATIENT
Start: 2017-05-30 | End: 2018-09-11

## 2017-05-30 RX ORDER — OLOPATADINE HYDROCHLORIDE 2 MG/ML
SOLUTION/ DROPS OPHTHALMIC
Refills: 4 | COMMUNITY
Start: 2017-05-05 | End: 2018-09-11

## 2017-05-30 RX ORDER — CLONAZEPAM 0.5 MG/1
TABLET ORAL
Qty: 45 TAB | Refills: 2 | Status: SHIPPED | OUTPATIENT
Start: 2017-05-30 | End: 2017-09-07 | Stop reason: SDUPTHER

## 2017-05-30 NOTE — PROGRESS NOTES
HISTORY OF PRESENT ILLNESS  Judah Post is a 80 y.o. female here with C/O right shoulder pain on and off for past several weeks. no fall or injury. also notice to have gait problem. She is not feeling good. her daughter is going to have a big eye surgery soon. she is anxious. Her anxiety is not controlled either. on celexa. 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 Labs are reviewed. stable. Shoulder Pain      Excessive Sweating     Anxiety         Review of Systems   HENT: Negative. Eyes: Negative. Respiratory: Negative. Cardiovascular: Negative. Gastrointestinal: Negative. Musculoskeletal: Positive for joint pain. Negative for falls. Skin: Negative. Neurological: Negative. Negative for loss of consciousness. Psychiatric/Behavioral: The patient is nervous/anxious. Physical Exam   Constitutional: She appears well-developed and well-nourished. No distress. HENT:   Mouth/Throat: Oropharyngeal exudate present. Neck: Normal range of motion. Neck supple. No JVD present. No thyromegaly present. Cardiovascular: Normal rate, regular rhythm, normal heart sounds and intact distal pulses. Pulmonary/Chest: Effort normal and breath sounds normal. No respiratory distress. She has no wheezes. She has no rales. Musculoskeletal: She exhibits tenderness. Right shoulder:tender. ROm OK   Neurological: Coordination abnormal.   Abnormal movement or arms and legs. No shuffling gait,no intention tremor. Psychiatric: Her behavior is normal. Judgment and thought content normal.   Anxious profoundly       ASSESSMENT and Lena Heritage was seen today for shoulder pain, excessive sweating and anxiety. Diagnoses and all orders for this visit:    Chronic right shoulder pain    Probable DJD. Will refer,  -     REFERRAL TO HOME HEALTH    Gait abnormality  Will get H/H for gait and balance. Anxiety    Not controlled. Will increase,  -     citalopram (CELEXA) 20 mg tablet;  Take 1 Tab by mouth daily. D/C celexa 10 mg    Hereditary chorea (HCC)    Klonopin only at night which helps her a lot. will add half tablet in morning too. Will give,  -     clonazePAM (KLONOPIN) 0.5 mg tablet; Take half tablet in AM and 1 tab at PM        Discussed expected course/resolution/complications of diagnosis in detail with patient. Medication risks/benefits/costs/interactions/alternatives discussed with patient. Pt was given an after visit summary which includes diagnoses, current medications & vitals. Pt expressed understanding with the diagnosis and plan.

## 2017-05-30 NOTE — PROGRESS NOTES
Health Maintenance Due   Topic Date Due    DTaP/Tdap/Td series (1 - Tdap) 10/21/1951    Pneumococcal 65+ Low/Medium Risk (2 of 2 - PPSV23) 06/16/2011    GLAUCOMA SCREENING Q2Y  04/09/2017    MEDICARE YEARLY EXAM  04/22/2017       Chief Complaint   Patient presents with    Shoulder Pain     right shoulder    Excessive Sweating     states at times she feels sweaty and was concerned       1. Have you been to the ER, urgent care clinic since your last visit? Hospitalized since your last visit? No    2. Have you seen or consulted any other health care providers outside of the [address] [place] [address] since your last visit? Include any pap smears or colon screening. No    3) Do you have an Advance Directive on file? no    4) Are you interested in receiving information on Advance Directives? NO      Patient is accompanied by self I have received verbal consent from Kranthi Burris to discuss any/all medical information while they are present in the room.

## 2017-05-30 NOTE — MR AVS SNAPSHOT
Visit Information Date & Time Provider Department Dept. Phone Encounter #  
 5/30/2017  1:30 PM Jose Davis MD [facility] Internal Medicine [facility] [phone] [id number] Upcoming Health Maintenance Date Due DTaP/Tdap/Td series (1 - Tdap) 10/21/1951 Pneumococcal 65+ Low/Medium Risk (2 of 2 - PPSV23) 6/16/2011 GLAUCOMA SCREENING Q2Y 4/9/2017 MEDICARE YEARLY EXAM 4/22/2017 INFLUENZA AGE 9 TO ADULT 8/1/2017 Allergies as of 5/30/2017  Review Complete On: 5/30/2017 By: Jose Davis MD  
  
 Severity Noted Reaction Type Reactions Keflex [Cephalexin]  06/16/2011   Topical Rash Current Immunizations  Reviewed on 5/30/2017 Name Date Influenza Vaccine Split 9/16/2010 Pneumococcal Vaccine (Unspecified Type) 6/16/2006 Reviewed by Jose Davis MD on 5/30/2017 at  2:27 PM  
You Were Diagnosed With   
  
 Codes Comments Chronic right shoulder pain    -  Primary ICD-10-CM: M25.511, G89.29 ICD-9-CM: 719.41, 338.29 Gait abnormality     ICD-10-CM: R26.9 ICD-9-CM: 008. 2 Anxiety     ICD-10-CM: F41.9 ICD-9-CM: 300.00 Hereditary chorea (HCC)     ICD-10-CM: G10 
ICD-9-CM: 333.4 Vitals BP Pulse Temp Resp Height(growth percentile) Weight(growth percentile) 120/70 (BP 1 Location: Left arm, BP Patient Position: Sitting) 94 97.6 °F (36.4 °C) (Oral) 20 5' 2\" (1.575 m) 151 lb 8 oz (68.7 kg) SpO2 BMI OB Status Smoking Status 97% 27.71 kg/m2 Postmenopausal Former Smoker Vitals History BMI and BSA Data Body Mass Index Body Surface Area  
 27.71 kg/m 2 1.73 m 2 Preferred Pharmacy Pharmacy Name Phone [facility] [phone] Your Updated Medication List  
  
   
This list is accurate as of: 5/30/17  2:31 PM.  Always use your most recent med list.  
  
  
  
  
 aspirin delayed-release 81 mg tablet Commonly known as:  ECOTRIN LOW STRENGTH  
 Take 1 Tab by mouth daily. calcium citrate-vitamin d3 315-200 mg-unit Tab Commonly known as:  CITRACAL+D Take 1 Tab by mouth daily (with breakfast). citalopram 20 mg tablet Commonly known as:  Garon Eng Take 1 Tab by mouth daily. D/C celexa 10 mg  
  
 clonazePAM 0.5 mg tablet Commonly known as:  Ardie Ender Take half tablet in AM and 1 tab at PM  
  
 fluticasone-vilanterol 100-25 mcg/dose inhaler Commonly known as:  BREO ELLIPTA Take 1 Puff by inhalation daily. PATADAY 0.2 % Drop ophthalmic solution Generic drug:  olopatadine Prescriptions Printed Refills  
 clonazePAM (KLONOPIN) 0.5 mg tablet 2 Sig: Take half tablet in AM and 1 tab at PM  
 Class: Print Prescriptions Sent to Pharmacy Refills  
 citalopram (CELEXA) 20 mg tablet 6 Sig: Take 1 Tab by mouth daily. D/C celexa 10 mg  
 Class: Normal  
 Pharmacy: [address] [place] [address]  Ph #: [phone] Route: Oral  
  
We Performed the Following [address] [place] [address] Comments:  
 Please evaluate patient for gait and balance and shoulder pain on right Referral Information Referral ID Referred By Referred To  
  
 [id number] Annabella Son Not Available Visits Status Start Date End Date 1 New Request 5/30/17 5/30/18 If your referral has a status of pending review or denied, additional information will be sent to support the outcome of this decision. Introducing [facility]! Nazario Dao introduces [employer] patient portal. Now you can access parts of your medical record, email your doctor's office, and request medication refills online. 1. In your internet browser, go to [email or web address] 2. Click on the First Time User? Click Here link in the Sign In box. You will see the New Member Sign Up page. 3. Enter your [employer] Access Code exactly as it appears below.  You will not need to use this code after youve completed the sign-up process. If you do not sign up before the expiration date, you must request a new code. · BOND Access Code: [email or web address] Expires: 8/28/2017  2:31 PM 
 
4. Enter the last four digits of your Social Security Number (xxxx) and Date of Birth (mm/dd/yyyy) as indicated and click Submit. You will be taken to the next sign-up page. 5. Create a BOND ID. This will be your BOND login ID and cannot be changed, so think of one that is secure and easy to remember. 6. Create a BOND password. You can change your password at any time. 7. Enter your Password Reset Question and Answer. This can be used at a later time if you forget your password. 8. Enter your e-mail address. You will receive e-mail notification when new information is available in [address] 9. Click Sign Up. You can now view and download portions of your medical record. 10. Click the Download Summary menu link to download a portable copy of your medical information. If you have questions, please visit the Frequently Asked Questions section of the BOND website. Remember, BOND is NOT to be used for urgent needs. For medical emergencies, dial 911. Now available from your iPhone and Android! Please provide this summary of care documentation to your next provider. Your primary care clinician is listed as Camden Chao. If you have any questions after today's visit, please call [phone].

## 2017-06-01 ENCOUNTER — TELEPHONE (OUTPATIENT)
Dept: INTERNAL MEDICINE CLINIC | Age: 82
End: 2017-06-01

## 2017-06-15 ENCOUNTER — TELEPHONE (OUTPATIENT)
Dept: INTERNAL MEDICINE CLINIC | Age: 82
End: 2017-06-15

## 2017-06-15 DIAGNOSIS — L50.9 URTICARIA: Primary | ICD-10-CM

## 2017-06-15 RX ORDER — AZELASTINE HYDROCHLORIDE 0.5 MG/ML
1 SOLUTION/ DROPS OPHTHALMIC 2 TIMES DAILY
Qty: 6 ML | Refills: 1 | Status: SHIPPED | OUTPATIENT
Start: 2017-06-15 | End: 2018-09-11 | Stop reason: SDUPTHER

## 2017-06-15 NOTE — PROGRESS NOTES
Requested Prescriptions     Signed Prescriptions Disp Refills    azelastine (OPTIVAR) 0.05 % ophthalmic solution 6 mL 1     Sig: Administer 1 Drop to right eye two (2) times a day. Use in affected eye(s)    PerDr. Rosanne New, verbal order received.

## 2017-06-15 NOTE — TELEPHONE ENCOUNTER
Call placed to patient, denies drainage from eye, just red and irritated looking.  Conferred with Dr Delicia Tucker, verbal order for optivar eye gtts bid, advised pt and voiced understanding and advised that if sx continue to come by [facility] office on Tuesday, voiced understanding

## 2017-06-15 NOTE — TELEPHONE ENCOUNTER
----- Message from Janice Mendez sent at 6/15/2017 11:04 AM EDT -----  Regarding: Dr. Gregory Pedraza, Pt's  called regarding pt right eye is very red and irritated. He would like a Rx sent to the pharmacy on file. Best contact is [phone].

## 2017-06-20 ENCOUNTER — OFFICE VISIT (OUTPATIENT)
Dept: INTERNAL MEDICINE CLINIC | Age: 82
End: 2017-06-20

## 2017-06-20 VITALS
BODY MASS INDEX: 28.52 KG/M2 | DIASTOLIC BLOOD PRESSURE: 68 MMHG | HEART RATE: 94 BPM | WEIGHT: 155 LBS | HEIGHT: 62 IN | TEMPERATURE: 97.9 F | RESPIRATION RATE: 20 BRPM | OXYGEN SATURATION: 99 % | SYSTOLIC BLOOD PRESSURE: 124 MMHG

## 2017-06-20 DIAGNOSIS — Z00.00 MEDICARE ANNUAL WELLNESS VISIT, SUBSEQUENT: ICD-10-CM

## 2017-06-20 DIAGNOSIS — F02.80 LATE ONSET ALZHEIMER'S DISEASE WITHOUT BEHAVIORAL DISTURBANCE (HCC): ICD-10-CM

## 2017-06-20 DIAGNOSIS — G30.1 LATE ONSET ALZHEIMER'S DISEASE WITHOUT BEHAVIORAL DISTURBANCE (HCC): ICD-10-CM

## 2017-06-20 DIAGNOSIS — Z23 ENCOUNTER FOR IMMUNIZATION: ICD-10-CM

## 2017-06-20 DIAGNOSIS — G10 HEREDITARY CHOREA (HCC): ICD-10-CM

## 2017-06-20 DIAGNOSIS — R41.0 CONFUSION: Primary | ICD-10-CM

## 2017-06-20 DIAGNOSIS — F41.8 ANXIETY WITH DEPRESSION: ICD-10-CM

## 2017-06-20 RX ORDER — MEMANTINE HYDROCHLORIDE 10 MG/1
10 TABLET ORAL DAILY
Qty: 30 TAB | Refills: 5 | Status: SHIPPED | OUTPATIENT
Start: 2017-06-20 | End: 2017-07-25 | Stop reason: SDUPTHER

## 2017-06-20 NOTE — PROGRESS NOTES
Emerald Lindsay is a 80 y.o. female and presents for annual Medicare Wellness Visit. Problem List: Reviewed with patient and discussed risk factors. Patient Active Problem List   Diagnosis Code    Cerebral thrombosis with cerebral infarction ([facility] [address] I63.30    COPD (chronic obstructive pulmonary disease) ([facility]) J44.9    Chronic respiratory failure with hypoxia ([facility]) J96.11    Sinus tachycardia R00.0    S/P knee replacement Z96.659    Obstructive sleep apnea G47.33    Dyslipidemia E78.5    Debility R53.81    Nausea and vomiting R11.2    UTI (urinary tract infection) N39.0    Hereditary chorea ([facility] [address] G10    Late onset Alzheimer's disease without behavioral disturbance G30.1, F02.80       Current medical providers:  Patient Care Team:  Evin Angel MD as PCP - General (Internal Medicine)  Jayleen Alas LPN as Ambulatory Care Navigator (Internal Medicine)    PSH: Reviewed with patient  Past Surgical History:   Procedure Laterality Date    ABDOMEN SURGERY [address] UNLISTED      hernia repair    HX GYN  1963    c section    HX HEENT      status post T&A    HX OOPHORECTOMY      HX ORTHOPAEDIC      status post left total knee replacement        SH: Reviewed with patient  Social History   Substance Use Topics    Smoking status: Former Smoker     Packs/day: 1.00     Years: 40.00     Quit date: 6/16/1982    Smokeless tobacco: Never Used    Alcohol use No       FH: Reviewed with patient  Family History   Problem Relation Age of Onset    Hypertension Maternal Grandmother     Hypertension Maternal Grandfather     Hypertension Paternal Grandmother     Hypertension Paternal Grandfather     MS Daughter        Medications/Allergies: Reviewed with patient  Current Outpatient Prescriptions on File Prior to Visit   Medication Sig Dispense Refill    azelastine (OPTIVAR) 0.05 % ophthalmic solution Administer 1 Drop to right eye two (2) times a day.  Use in affected eye(s) 6 mL 1    PATADAY 0.2 % drop ophthalmic solution   4    citalopram (CELEXA) 20 mg tablet Take 1 Tab by mouth daily. D/C celexa 10 mg 30 Tab 6    clonazePAM (KLONOPIN) 0.5 mg tablet Take half tablet in AM and 1 tab at PM 45 Tab 2    fluticasone-vilanterol (BREO ELLIPTA) 100-25 mcg/dose inhaler Take 1 Puff by inhalation daily. 1 Inhaler 5    aspirin delayed-release (ECOTRIN LOW STRENGTH) 81 mg tablet Take 1 Tab by mouth daily. 30 Tab 12    calcium citrate-vitamin d3 (CITRACAL+D) 315-200 mg-unit tab Take 1 Tab by mouth daily (with breakfast). 30 Tab 12     No current facility-administered medications on file prior to visit. Allergies   Allergen Reactions    Keflex [Cephalexin] Rash       Objective:  Visit Vitals    /68 (BP 1 Location: Left arm, BP Patient Position: Sitting)    Pulse 94    Temp 97.9 °F (36.6 °C) (Oral)    Resp 20    Ht 5' 2\" (1.575 m)    Wt 155 lb (70.3 kg)    SpO2 99%    BMI 28.35 kg/m2    Body mass index is 28.35 kg/(m^2). Assessment of cognitive impairment: Alert and oriented x 2    Depression Screen:   PHQ over the last two weeks 6/20/2017   Little interest or pleasure in doing things Not at all   Feeling down, depressed or hopeless Not at all   Total Score PHQ 2 0       Fall Risk Assessment:    Fall Risk Assessment, last 12 mths 6/20/2017   Able to walk? Yes   Fall in past 12 months? No       Functional Ability:   Does the patient exhibit a steady gait?  no   How long did it take the patient to get up and walk from a sitting position? 2 min   Is the patient self reliant?  (ie can do own laundry, meals, household chores)  no     Does the patient handle his/her own medications?  no     Does the patient handle his/her own money? no     Is the patients home safe (ie good lighting, handrails on stairs and bath, etc.)? no     Did you notice or did patient express any hearing difficulties?    yes     Did you notice or did patient express any vision difficulties?   no     Were distance and reading eye charts used? no       Advance Care Planning:   Patient was offered the opportunity to discuss advance care planning:  no     Does patient have an Advance Directive:  yes   If no, did you provide information on Caring Connections?  no       Plan:      Orders Placed This Encounter    pneumococcal 13 naren conj dip (PREVNAR-13) 0.5 mL syrg injection    memantine (NAMENDA) 10 mg tablet       Health Maintenance   Topic Date Due    DTaP/Tdap/Td series (1 - Tdap) 10/21/1951    Pneumococcal 65+ Low/Medium Risk (2 of 2 - PPSV23) 06/16/2011    GLAUCOMA SCREENING Q2Y  04/09/2017    MEDICARE YEARLY EXAM  04/22/2017    INFLUENZA AGE 9 TO ADULT  08/01/2017    OSTEOPOROSIS SCREENING (DEXA)  Completed    ZOSTER VACCINE AGE 60>  Addressed       *Patient verbalized understanding and agreement with the plan. A copy of the After Visit Summary with personalized health plan was given to the patient today.

## 2017-06-20 NOTE — MR AVS SNAPSHOT
Visit Information Date & Time Provider Department Dept. Phone Encounter #  
 6/20/2017  8:45 AM Chloe Jacobo MD [facility] Internal Medicine [facility] [phone] [id number] Your Appointments 7/25/2017  1:15 PM  
ROUTINE CARE with Chloe Jacobo MD  
[facility] ([facility]) Appt Note: 2 month follow up [address] [place] [phone]  
  
   
 [address] [place] [phone] Upcoming Health Maintenance Date Due DTaP/Tdap/Td series (1 - Tdap) 10/21/1951 Pneumococcal 65+ Low/Medium Risk (2 of 2 - PPSV23) 6/16/2011 GLAUCOMA SCREENING Q2Y 4/9/2017 MEDICARE YEARLY EXAM 4/22/2017 INFLUENZA AGE 9 TO ADULT 8/1/2017 Allergies as of 6/20/2017  Review Complete On: 6/20/2017 By: Neita Paget, LPN Severity Noted Reaction Type Reactions Keflex [Cephalexin]  06/16/2011   Topical Rash Current Immunizations  Reviewed on 6/20/2017 Name Date Influenza Vaccine Split 9/16/2010 Pneumococcal Vaccine (Unspecified Type) 6/16/2006 Zoster Vaccine, Live 7/21/2016 Reviewed by Chloe Jacobo MD on 6/20/2017 at  9:14 AM  
You Were Diagnosed With   
  
 Codes Comments Confusion    -  Primary ICD-10-CM: R41.0 ICD-9-CM: 298.9 Late onset Alzheimer's disease without behavioral disturbance     ICD-10-CM: G30.1, F02.80 ICD-9-CM: 331.0, 294.10 Hereditary chorea (HCC)     ICD-10-CM: G10 
ICD-9-CM: 333.4 Anxiety with depression     ICD-10-CM: F41.8 ICD-9-CM: 300.4 Medicare annual wellness visit, subsequent     ICD-10-CM: Z00.00 ICD-9-CM: V70.0 Encounter for immunization     ICD-10-CM: B43 ICD-9-CM: V03.89 Vitals BP Pulse Temp Resp Height(growth percentile) Weight(growth percentile) 124/68 (BP 1 Location: Left arm, BP Patient Position: Sitting) 94 97.9 °F (36.6 °C) (Oral) 20 5' 2\" (1.575 m) 155 lb (70.3 kg) SpO2 BMI OB Status Smoking Status 99% 28.35 kg/m2 Postmenopausal Former Smoker Vitals History BMI and BSA Data Body Mass Index Body Surface Area  
 28.35 kg/m 2 1.75 m 2 Preferred Pharmacy Pharmacy Name [facility] [phone] Your Updated Medication List  
  
   
This list is accurate as of: 17  9:21 AM.  Always use your most recent med list.  
  
  
  
  
 aspirin delayed-release 81 mg tablet Commonly known as:  ECOTRIN LOW STRENGTH Take 1 Tab by mouth daily. azelastine 0.05 % ophthalmic solution Commonly known as:  OPTIVAR Administer 1 Drop to right eye two (2) times a day. Use in affected eye(s)  
  
 calcium citrate-vitamin d3 315-200 mg-unit Tab Commonly known as:  CITRACAL+D Take 1 Tab by mouth daily (with breakfast). citalopram 20 mg tablet Commonly known as:  Ethan Flatness Take 1 Tab by mouth daily. D/C celexa 10 mg  
  
 clonazePAM 0.5 mg tablet Commonly known as:  Rajinder Mili Take half tablet in AM and 1 tab at PM  
  
 fluticasone-vilanterol 100-25 mcg/dose inhaler Commonly known as:  BREO ELLIPTA Take 1 Puff by inhalation daily. memantine 10 mg tablet Commonly known as:  [place] [employer] Take 1 Tab by mouth daily. PATADAY 0.2 % Drop ophthalmic solution Generic drug:  olopatadine  
  
 pneumococcal 13 naren conj dip 0.5 mL Syrg injection Commonly known as:  PREVNAR-13  
0.5 mL by IntraMUSCular route once for 1 dose. Prescriptions Sent to Pharmacy Refills  
 pneumococcal 13 naren conj dip (PREVNAR-13) 0.5 mL syrg injection 0 Si.5 mL by IntraMUSCular route once for 1 dose. Class: Normal  
 Pharmacy: [address] [place] [address]  Ph #: [phone] Route: IntraMUSCular  
 memantine (NAMENDA) 10 mg tablet 5 Sig: Take 1 Tab by mouth daily.   
 Class: Normal  
 Pharmacy: [facility]  #: [phone] Route: Oral  
  
Patient Instructions Schedule of Personalized Health Plan (Provide Copy to Patient) The best way to stay healthy is to live a healthy lifestyle. A healthy lifestyle includes regular exercise, eating a well-balanced diet, keeping a healthy weight and not smoking. Regular physical exams and screening tests are another important way to take care of yourself. Preventive exams provided by health care providers can find health problems early when treatment works best and can keep you from getting certain diseases or illnesses. Preventive services include exams, lab tests, screenings, shots, monitoring and information to help you take care of your own health. All people over 65 should have a pneumonia shot. Pneumonia shots are usually only needed once in a lifetime unless your doctor decides differently. Ordered pneumonia shot All people over 65 should have a yearly flu shot. People over 65 are at medium to high risk for Hepatitis B. Three shots are needed for complete protection. In addition to your physical exam, some screening tests are recommended: 
 
Bone mass measurement (dexa scan) is recommended every two years Diabetes Mellitus screening is recommended every year. up to date Glaucoma is an eye disease caused by high pressure in the eye. An eye exam is recommended every year. Up to date Cardiovascular screening tests that check your cholesterol and other blood fat (lipid) levels are recommended every five years. Colorectal Cancer screening tests help to find pre-cancerous polyps (growths in the colon) so they can be removed before they turn into cancer. Tests ordered for screening depend on your personal and family history risk factors.  
 
Screening for Breast Cancer is recommended yearly with a mammogram.N/A 
 
 Screening for Cervical Cancer is recommended every two years (annually for certain risk factors, such as previous history of STD or abnormal PAP in past 7 years), with a Pelvic Exam with PAP. N/A Here is a list of your current Health Maintenance items with a due date: 
Health Maintenance Topic Date Due  
 DTaP/Tdap/Td series (1 - Tdap) 10/21/1951  Pneumococcal 65+ Low/Medium Risk (2 of 2 - PPSV23) 06/16/2011  GLAUCOMA SCREENING Q2Y  04/09/2017  MEDICARE YEARLY EXAM  04/22/2017  INFLUENZA AGE 9 TO ADULT  08/01/2017  
 OSTEOPOROSIS SCREENING (DEXA)  Completed  ZOSTER VACCINE AGE 60>  Addressed Introducing [facility]! [facility] introduces [employer] patient portal. Now you can access parts of your medical record, email your doctor's office, and request medication refills online. 1. In your internet browser, go to [email or web address] 2. Click on the First Time User? Click Here link in the Sign In box. You will see the New Member Sign Up page. 3. Enter your [employer] Access Code exactly as it appears below. You will not need to use this code after youve completed the sign-up process. If you do not sign up before the expiration date, you must request a new code. · [employer] Access Code: [email or web address] Expires: 8/28/2017  2:31 PM 
 
4. Enter the last four digits of your Social Security Number (xxxx) and Date of Birth (mm/dd/yyyy) as indicated and click Submit. You will be taken to the next sign-up page. 5. Create a [employer] ID. This will be your [employer] login ID and cannot be changed, so think of one that is secure and easy to remember. 6. Create a [employer] password. You can change your password at any time. 7. Enter your Password Reset Question and Answer. This can be used at a later time if you forget your password. 8. Enter your e-mail address. You will receive e-mail notification when new information is available in [address] 9. Click Sign Up. You can now view and download portions of your medical record. 10. Click the Download Summary menu link to download a portable copy of your medical information. If you have questions, please visit the Frequently Asked Questions section of the [email or web address] Application website. Remember, [email or web address] Application is NOT to be used for urgent needs. For medical emergencies, dial 911. Now available from your iPhone and Android! Please provide this summary of care documentation to your next provider. Your primary care clinician is listed as Jett Mendez. If you have any questions after today's visit, please call [phone].

## 2017-06-20 NOTE — PROGRESS NOTES
HISTORY OF PRESENT ILLNESS  Vj Arellano is a 80 y.o. female here with C/O confusion and forgetfullness. She is accompanied by her . lately her short term memory is getting worse. no dysuria or frequency. Has chorea,betetr with addition of half klonopin in morning. also anxiety is better controlled. Has shoulder pain sometimes when she leans to hold walker. working with PT. Here for medicare wellness visit. need  Vaccine. HPI    Review of Systems   Constitutional: Negative. HENT: Negative. Eyes: Negative. Respiratory: Negative. Cardiovascular: Negative. Gastrointestinal: Negative. Genitourinary: Negative. Musculoskeletal: Negative. Skin: Negative. Neurological: Negative. Endo/Heme/Allergies: Negative. Psychiatric/Behavioral: Positive for memory loss. The patient is nervous/anxious. Physical Exam   Constitutional: She is oriented to person, place, and time. She appears well-developed and well-nourished. No distress. Neck: Normal range of motion. Neck supple. No JVD present. No thyromegaly present. Cardiovascular: Normal rate, regular rhythm, normal heart sounds and intact distal pulses. Pulmonary/Chest: Effort normal and breath sounds normal. No respiratory distress. She has no wheezes. Abdominal: Soft. Bowel sounds are normal. She exhibits no distension. There is no tenderness. KUB NT   Neurological: She is alert and oriented to person, place, and time. She has normal reflexes. She displays normal reflexes. No cranial nerve deficit. She exhibits normal muscle tone. Coordination normal.   Psychiatric: She has a normal mood and affect. Her behavior is normal.   MMSE 18.       ASSESSMENT and PLAN  eDandra Mills was seen today for altered mental status, eye problem and annual wellness visit. Diagnoses and all orders for this visit:    Confusion    Probably from dementia.will check UA.     Late onset Alzheimer's disease without behavioral disturbance    MMSE is 18.  Will start,  -     memantine (NAMENDA) 10 mg tablet; Take 1 Tab by mouth daily. The risks and benefits of treatment were discussed as well as the potential side effects. The patient verbalized understanding and agrees to the current treatment plan. The patient is instructed to call the office with any side effects        Hereditary chorea ([address]    Better with addition of klonopin in morning. Anxiety with depression    On celexa. stable now. Medicare annual wellness visit, subsequent    Encounter for immunization  Will order,  -     pneumococcal 13 naren conj dip (PREVNAR-13) 0.5 mL syrg injection; 0.5 mL by IntraMUSCular route once for 1 dose. Discussed expected course/resolution/complications of diagnosis in detail with patient. Medication risks/benefits/costs/interactions/alternatives discussed with patient. Pt was given an after visit summary which includes diagnoses, current medications & vitals. Pt expressed understanding with the diagnosis and plan.

## 2017-06-20 NOTE — PROGRESS NOTES
Health Maintenance Due   Topic Date Due    DTaP/Tdap/Td series (1 - Tdap) 10/21/1951    Pneumococcal 65+ Low/Medium Risk (2 of 2 - PPSV23) 06/16/2011    GLAUCOMA SCREENING Q2Y  04/09/2017    MEDICARE YEARLY EXAM  04/22/2017       Chief Complaint   Patient presents with    Altered mental status     per  increase in confusion and forgetfulness    Eye Problem     rt eye red and irritated       1. Have you been to the ER, urgent care clinic since your last visit? Hospitalized since your last visit? No    2. Have you seen or consulted any other health care providers outside of the [address] [place] [address] since your last visit? Include any pap smears or colon screening. No    3) Do you have an Advance Directive on file? no    4) Are you interested in receiving information on Advance Directives? NO      Patient is accompanied by self I have received verbal consent from Corey Samuels to discuss any/all medical information while they are present in the room.

## 2017-06-20 NOTE — PATIENT INSTRUCTIONS
Schedule of Personalized Health Plan  (Provide Copy to Patient)  The best way to stay healthy is to live a healthy lifestyle. A healthy lifestyle includes regular exercise, eating a well-balanced diet, keeping a healthy weight and not smoking. Regular physical exams and screening tests are another important way to take care of yourself. Preventive exams provided by health care providers can find health problems early when treatment works best and can keep you from getting certain diseases or illnesses. Preventive services include exams, lab tests, screenings, shots, monitoring and information to help you take care of your own health. All people over 65 should have a pneumonia shot. Pneumonia shots are usually only needed once in a lifetime unless your doctor decides differently. Ordered pneumonia shot    All people over 65 should have a yearly flu shot. People over 65 are at medium to high risk for Hepatitis B. Three shots are needed for complete protection. In addition to your physical exam, some screening tests are recommended:    Bone mass measurement (dexa scan) is recommended every two years  Diabetes Mellitus screening is recommended every year. up to date    Glaucoma is an eye disease caused by high pressure in the eye. An eye exam is recommended every year. Up to date    Cardiovascular screening tests that check your cholesterol and other blood fat (lipid) levels are recommended every five years. Colorectal Cancer screening tests help to find pre-cancerous polyps (growths in the colon) so they can be removed before they turn into cancer. Tests ordered for screening depend on your personal and family history risk factors.     Screening for Breast Cancer is recommended yearly with a mammogram.N/A    Screening for Cervical Cancer is recommended every two years (annually for certain risk factors, such as previous history of STD or abnormal PAP in past 7 years), with a Pelvic Exam with PAP.N/A    Here is a list of your current Health Maintenance items with a due date:  Health Maintenance   Topic Date Due    DTaP/Tdap/Td series (1 - Tdap) 10/21/1951    Pneumococcal 65+ Low/Medium Risk (2 of 2 - PPSV23) 06/16/2011    GLAUCOMA SCREENING Q2Y  04/09/2017    MEDICARE YEARLY EXAM  04/22/2017    INFLUENZA AGE 9 TO ADULT  08/01/2017    OSTEOPOROSIS SCREENING (DEXA)  Completed    ZOSTER VACCINE AGE 60>  Addressed

## 2017-07-25 ENCOUNTER — OFFICE VISIT (OUTPATIENT)
Dept: INTERNAL MEDICINE CLINIC | Age: 82
End: 2017-07-25

## 2017-07-25 VITALS
OXYGEN SATURATION: 93 % | TEMPERATURE: 97.8 F | WEIGHT: 155 LBS | HEART RATE: 94 BPM | BODY MASS INDEX: 28.52 KG/M2 | HEIGHT: 62 IN | SYSTOLIC BLOOD PRESSURE: 138 MMHG | DIASTOLIC BLOOD PRESSURE: 85 MMHG | RESPIRATION RATE: 22 BRPM

## 2017-07-25 DIAGNOSIS — F02.80 LATE ONSET ALZHEIMER'S DISEASE WITHOUT BEHAVIORAL DISTURBANCE (HCC): ICD-10-CM

## 2017-07-25 DIAGNOSIS — M19.019 SHOULDER ARTHRITIS: Primary | ICD-10-CM

## 2017-07-25 DIAGNOSIS — I63.30 CEREBRAL THROMBOSIS WITH CEREBRAL INFARCTION (HCC): ICD-10-CM

## 2017-07-25 DIAGNOSIS — M47.812 NECK ARTHRITIS: ICD-10-CM

## 2017-07-25 DIAGNOSIS — R26.81 GAIT INSTABILITY: ICD-10-CM

## 2017-07-25 DIAGNOSIS — G30.1 LATE ONSET ALZHEIMER'S DISEASE WITHOUT BEHAVIORAL DISTURBANCE (HCC): ICD-10-CM

## 2017-07-25 RX ORDER — MELOXICAM 7.5 MG/1
7.5 TABLET ORAL DAILY
Qty: 30 TAB | Refills: 5 | Status: SHIPPED | OUTPATIENT
Start: 2017-07-25 | End: 2018-09-11

## 2017-07-25 RX ORDER — MEMANTINE HYDROCHLORIDE 10 MG/1
10 TABLET ORAL 2 TIMES DAILY
Qty: 60 TAB | Refills: 5 | Status: SHIPPED | OUTPATIENT
Start: 2017-07-25 | End: 2018-02-13

## 2017-07-25 NOTE — MR AVS SNAPSHOT
Visit Information Date & Time Provider Department Dept. Phone Encounter #  
 7/25/2017  1:15 PM Taya Esteban MD [facility] Internal Medicine [facility] [phone] [id number] Your Appointments 8/22/2017  8:45 AM  
Any with Taya Esteban MD  
[facility] ([facility]) Appt Note: follow up   
 [address] [facility]. [facility] María Elena Pop [phone]  
  
   
 [address] [place] [address]. [address] [place] [phone] Upcoming Health Maintenance Date Due DTaP/Tdap/Td series (1 - Tdap) 10/21/1951 Pneumococcal 65+ Low/Medium Risk (2 of 2 - PPSV23) 6/16/2011 GLAUCOMA SCREENING Q2Y 4/9/2017 INFLUENZA AGE 9 TO ADULT 8/1/2017 MEDICARE YEARLY EXAM 6/21/2018 Allergies as of 7/25/2017  Review Complete On: 7/25/2017 By: Taya Esteban MD  
  
 Severity Noted Reaction Type Reactions Keflex [Cephalexin]  06/16/2011   Topical Rash Current Immunizations  Reviewed on 6/20/2017 Name Date Influenza Vaccine Split 9/16/2010 Pneumococcal Vaccine (Unspecified Type) 6/16/2006 Zoster Vaccine, Live 7/21/2016 Not reviewed this visit You Were Diagnosed With   
  
 Codes Comments Shoulder arthritis    -  Primary ICD-10-CM: M12.9 ICD-9-CM: 716.91 Neck arthritis ([facility] [address]     ICD-10-CM: M46.92 
ICD-9-CM: 721.0 Gait instability     ICD-10-CM: R26.81 
ICD-9-CM: 719. 2 Late onset Alzheimer's disease without behavioral disturbance     ICD-10-CM: G30.1, F02.80 ICD-9-CM: 331.0, 294.10 Cerebral thrombosis with cerebral infarction [facility])     ICD-10-CM: I63.30 ICD-9-CM: 434.01 Vitals BP Pulse Temp Resp Height(growth percentile) Weight(growth percentile) 138/85 94 97.8 °F (36.6 °C) (Oral) 22 5' 2\" (1.575 m) 155 lb (70.3 kg) SpO2 BMI OB Status Smoking Status 93% 28.35 kg/m2 Postmenopausal Former Smoker BMI and BSA Data Body Mass Index Body Surface Area 28.35 kg/m 2 1.75 m 2 Preferred Pharmacy Pharmacy Name [facility] [phone] Your Updated Medication List  
  
   
This list is accurate as of: 7/25/17  1:40 PM.  Always use your most recent med list.  
  
  
  
  
 aspirin delayed-release 81 mg tablet Commonly known as:  ECOTRIN LOW STRENGTH Take 1 Tab by mouth daily. azelastine 0.05 % ophthalmic solution Commonly known as:  OPTIVAR Administer 1 Drop to right eye two (2) times a day. Use in affected eye(s)  
  
 calcium citrate-vitamin d3 315-200 mg-unit Tab Commonly known as:  CITRACAL+D Take 1 Tab by mouth daily (with breakfast). citalopram 20 mg tablet Commonly known as:  Hylton Janeth Take 1 Tab by mouth daily. D/C celexa 10 mg  
  
 clonazePAM 0.5 mg tablet Commonly known as:  Macie Dalzell Take half tablet in AM and 1 tab at PM  
  
 fluticasone-vilanterol 100-25 mcg/dose inhaler Commonly known as:  BREO ELLIPTA Take 1 Puff by inhalation daily. meloxicam 7.5 mg tablet Commonly known as:  MOBIC Take 1 Tab by mouth daily. memantine 10 mg tablet Commonly known as:  Lynette Aspen Take 1 Tab by mouth two (2) times a day. PATADAY 0.2 % Drop ophthalmic solution Generic drug:  olopatadine Prescriptions Sent to Pharmacy Refills  
 meloxicam (MOBIC) 7.5 mg tablet 5 Sig: Take 1 Tab by mouth daily. Class: Normal  
 Pharmacy: [address] [place], [place] [address]and Dr. Ph #: [phone] Route: Oral  
 memantine (NAMENDA) 10 mg tablet 5 Sig: Take 1 Tab by mouth two (2) times a day. Class: Normal  
 Pharmacy: [address] [facility] Ph #: [phone] Route: Oral  
  
We Performed the Following [address] Comments:  
 Please evaluate patient for gait,bnalance,shoulder and neck arthritis Referral Information Referral ID Referred By Referred To  
  
 [id number] Lashay Alcazar Not Available Visits Status Start Date End Date 1 New Request 7/25/17 7/25/18 If your referral has a status of pending review or denied, additional information will be sent to support the outcome of this decision. Patient Instructions Neck Arthritis: Exercises Your Care Instructions Here are some examples of typical rehabilitation exercises for your condition. Start each exercise slowly. Ease off the exercise if you start to have pain. Your doctor or physical therapist will tell you when you can start these exercises and which ones will work best for you. How to do the exercises Neck stretches to the side 1. This stretch works best if you keep your shoulder down as you lean away from it. To help you remember to do this, start by relaxing your shoulders and lightly holding on to your thighs or your chair. 2. Tilt your head toward your shoulder and hold for 15 to 30 seconds. Let the weight of your head stretch your muscles. 3. Repeat 2 to 4 times toward each shoulder. Chin tuck 1. Lie on the floor with a rolled-up towel under your neck. Your head should be touching the floor. 2. Slowly bring your chin toward your chest. 
3. Hold for a count of 6, and then relax for up to 10 seconds. 4. Repeat 8 to 12 times. Active cervical rotation 1. Sit in a firm chair, or stand up straight. 2. Keeping your chin level, turn your head to the right, and hold for 15 to 30 seconds. 3. Turn your head to the left and hold for 15 to 30 seconds. 4. Repeat 2 to 4 times to each side. Shoulder blade squeeze 1. While standing, squeeze your shoulder blades together. 2. Do not raise your shoulders up as you are squeezing. 3. Hold for 6 seconds. 4. Repeat 8 to 12 times. Shoulder rolls 1. Sit comfortably with your feet shoulder-width apart. You can also do this exercise standing up. 2. Roll your shoulders up, then back, and then down in a smooth, circular motion. 3. Repeat 2 to 4 times. Follow-up care is a key part of your treatment and safety. Be sure to make and go to all appointments, and call your doctor if you are having problems. It's also a good idea to know your test results and keep a list of the medicines you take. Where can you learn more? Go to http://izabella-dylan.info/. Enter F582 in the search box to learn more about \"Neck Arthritis: Exercises. \" Current as of: March 21, 2017 Content Version: 11.3 © 7122-3126 [employer]. Care instructions adapted under license by [employer] (which disclaims liability or warranty for this information). If you have questions about a medical condition or this instruction, always ask your healthcare professional. [address] any warranty or liability for your use of this information. Introducing [facility]! Zara Shin introduces [employer] patient portal. Now you can access parts of your medical record, email your doctor's office, and request medication refills online. 1. In your internet browser, go to [email or web address] 2. Click on the First Time User? Click Here link in the Sign In box. You will see the New Member Sign Up page. 3. Enter your [employer] Access Code exactly as it appears below. You will not need to use this code after youve completed the sign-up process. If you do not sign up before the expiration date, you must request a new code. · [employer] Access Code: [email or web address] Expires: 8/28/2017  2:31 PM 
 
4. Enter the last four digits of your Social Security Number (xxxx) and Date of Birth (mm/dd/yyyy) as indicated and click Submit. You will be taken to the next sign-up page. 5. Create a [employer] ID. This will be your [employer] login ID and cannot be changed, so think of one that is secure and easy to remember. 6. Create a [employer] password. You can change your password at any time. 7. Enter your Password Reset Question and Answer. This can be used at a later time if you forget your password. 8. Enter your e-mail address. You will receive e-mail notification when new information is available in [address] 9. Click Sign Up. You can now view and download portions of your medical record. 10. Click the Download Summary menu link to download a portable copy of your medical information. If you have questions, please visit the Frequently Asked Questions section of the [employer] website. Remember, [employer] is NOT to be used for urgent needs. For medical emergencies, dial 911. Now available from your iPhone and Android! Please provide this summary of care documentation to your next provider. Your primary care clinician is listed as Radha Gilmore. If you have any questions after today's visit, please call [phone].

## 2017-07-25 NOTE — PROGRESS NOTES
HISTORY OF PRESENT ILLNESS  Daysi Tyler is a 80 y.o. female here with C/O right shoulder pain on and off for past several weeks. no fall or injury. also notice to have gait problem. Reports pain radiates to her right arm up to her wrist.has neck arthritis. Her anxiety is not controlled either. on celexa. 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 Has dementia. stable. able to tolerate namenda well. Labs are reviewed. stable. Follow-up     Shoulder Pain      Dementia      Anxiety         Review of Systems   HENT: Negative. Eyes: Negative. Respiratory: Negative. Cardiovascular: Negative. Gastrointestinal: Negative. Musculoskeletal: Positive for joint pain. Negative for falls. Skin: Negative. Neurological: Negative. Negative for loss of consciousness. Psychiatric/Behavioral: Positive for memory loss. The patient is nervous/anxious. Physical Exam   Constitutional: She appears well-developed and well-nourished. No distress. Neck: Normal range of motion. Neck supple. No JVD present. No thyromegaly present. Cardiovascular: Normal rate, regular rhythm, normal heart sounds and intact distal pulses. Pulmonary/Chest: Effort normal and breath sounds normal. No respiratory distress. She has no wheezes. She has no rales. Musculoskeletal: She exhibits tenderness. Right shoulder:tender. ROm OK. Neck;Spine NT.rom restricted   Neurological: Coordination abnormal.   Abnormal movement or arms and legs. No shuffling gait,no intention tremor. Psychiatric: Her behavior is normal. Judgment and thought content normal.   Anxious profoundly       ASSESSMENT and Elli Organ was seen today for follow-up, shoulder pain and dementia. Diagnoses and all orders for this visit:    Shoulder arthritis  Will do,    -     meloxicam (MOBIC) 7.5 mg tablet; Take 1 Tab by mouth daily.  -     REFERRAL TO PHYSICAL THERAPY    Neck arthritis (HCC)  -     meloxicam (MOBIC) 7.5 mg tablet;  Take 1 Tab by mouth daily.  -     REFERRAL TO PHYSICAL THERAPY    Gait instability  -     REFERRAL TO HOME HEALTH    Late onset Alzheimer's disease without behavioral disturbance    Better. Will increase,  -     memantine (NAMENDA) 10 mg tablet; Take 1 Tab by mouth two (2) times a day. Cerebral thrombosis with cerebral infarction (HCC)    Stable. Discussed expected course/resolution/complications of diagnosis in detail with patient. Medication risks/benefits/costs/interactions/alternatives discussed with patient. Pt was given an after visit summary which includes diagnoses, current medications & vitals. Pt expressed understanding with the diagnosis and plan.

## 2017-07-25 NOTE — PATIENT INSTRUCTIONS
Neck Arthritis: Exercises  Your Care Instructions  Here are some examples of typical rehabilitation exercises for your condition. Start each exercise slowly. Ease off the exercise if you start to have pain. Your doctor or physical therapist will tell you when you can start these exercises and which ones will work best for you. How to do the exercises  Neck stretches to the side    1. This stretch works best if you keep your shoulder down as you lean away from it. To help you remember to do this, start by relaxing your shoulders and lightly holding on to your thighs or your chair. 2. Tilt your head toward your shoulder and hold for 15 to 30 seconds. Let the weight of your head stretch your muscles. 3. Repeat 2 to 4 times toward each shoulder. Chin tuck    1. Lie on the floor with a rolled-up towel under your neck. Your head should be touching the floor. 2. Slowly bring your chin toward your chest.  3. Hold for a count of 6, and then relax for up to 10 seconds. 4. Repeat 8 to 12 times. Active cervical rotation    1. Sit in a firm chair, or stand up straight. 2. Keeping your chin level, turn your head to the right, and hold for 15 to 30 seconds. 3. Turn your head to the left and hold for 15 to 30 seconds. 4. Repeat 2 to 4 times to each side. Shoulder blade squeeze    1. While standing, squeeze your shoulder blades together. 2. Do not raise your shoulders up as you are squeezing. 3. Hold for 6 seconds. 4. Repeat 8 to 12 times. Shoulder rolls    1. Sit comfortably with your feet shoulder-width apart. You can also do this exercise standing up. 2. Roll your shoulders up, then back, and then down in a smooth, circular motion. 3. Repeat 2 to 4 times. Follow-up care is a key part of your treatment and safety. Be sure to make and go to all appointments, and call your doctor if you are having problems. It's also a good idea to know your test results and keep a list of the medicines you take.   Where can you learn more? Go to http://izabella-dylan.info/. Enter E561 in the search box to learn more about \"Neck Arthritis: Exercises. \"  Current as of: March 21, 2017  Content Version: 11.3  © 9439-8724 [employer]. Care instructions adapted under license by [employer] (which disclaims liability or warranty for this information). If you have questions about a medical condition or this instruction, always ask your healthcare professional. [address] any warranty or liability for your use of this information.

## 2017-09-07 DIAGNOSIS — G10 HEREDITARY CHOREA (HCC): ICD-10-CM

## 2017-09-07 RX ORDER — CLONAZEPAM 0.5 MG/1
TABLET ORAL
Qty: 45 TAB | Refills: 2 | Status: SHIPPED | OUTPATIENT
Start: 2017-09-07 | End: 2017-12-13 | Stop reason: SDUPTHER

## 2017-10-05 DIAGNOSIS — G30.1 LATE ONSET ALZHEIMER'S DISEASE WITHOUT BEHAVIORAL DISTURBANCE (HCC): ICD-10-CM

## 2017-10-05 DIAGNOSIS — R53.81 DEBILITY: Primary | ICD-10-CM

## 2017-10-05 DIAGNOSIS — I63.30 CEREBRAL THROMBOSIS WITH CEREBRAL INFARCTION (HCC): ICD-10-CM

## 2017-10-05 DIAGNOSIS — F02.80 LATE ONSET ALZHEIMER'S DISEASE WITHOUT BEHAVIORAL DISTURBANCE (HCC): ICD-10-CM

## 2017-10-10 ENCOUNTER — TELEPHONE (OUTPATIENT)
Dept: INTERNAL MEDICINE CLINIC | Age: 82
End: 2017-10-10

## 2017-10-10 NOTE — TELEPHONE ENCOUNTER
Received call from Savanna Mckenna, [address] [facility] with [facility]. Patient seen for evaluation for power wheelchair/ scooter. Strengthening, gait/ transfer training also recommended. Verbal orders provided to proceed with PT. Request patient be seen in office for evaluation.

## 2017-10-17 ENCOUNTER — OFFICE VISIT (OUTPATIENT)
Dept: INTERNAL MEDICINE CLINIC | Age: 82
End: 2017-10-17

## 2017-10-17 VITALS
BODY MASS INDEX: 28.52 KG/M2 | HEART RATE: 84 BPM | DIASTOLIC BLOOD PRESSURE: 76 MMHG | WEIGHT: 155 LBS | OXYGEN SATURATION: 92 % | TEMPERATURE: 97 F | RESPIRATION RATE: 20 BRPM | HEIGHT: 62 IN | SYSTOLIC BLOOD PRESSURE: 137 MMHG

## 2017-10-17 DIAGNOSIS — G10 HEREDITARY CHOREA (HCC): ICD-10-CM

## 2017-10-17 DIAGNOSIS — F02.80 LATE ONSET ALZHEIMER'S DISEASE WITHOUT BEHAVIORAL DISTURBANCE (HCC): ICD-10-CM

## 2017-10-17 DIAGNOSIS — I63.30 CEREBRAL THROMBOSIS WITH CEREBRAL INFARCTION (HCC): ICD-10-CM

## 2017-10-17 DIAGNOSIS — J44.9 CHRONIC OBSTRUCTIVE PULMONARY DISEASE, UNSPECIFIED COPD TYPE (HCC): ICD-10-CM

## 2017-10-17 DIAGNOSIS — E55.9 VITAMIN D DEFICIENCY: ICD-10-CM

## 2017-10-17 DIAGNOSIS — M25.552 PAIN OF LEFT HIP JOINT: ICD-10-CM

## 2017-10-17 DIAGNOSIS — Z13.5 GLAUCOMA SCREENING: ICD-10-CM

## 2017-10-17 DIAGNOSIS — G30.1 LATE ONSET ALZHEIMER'S DISEASE WITHOUT BEHAVIORAL DISTURBANCE (HCC): ICD-10-CM

## 2017-10-17 DIAGNOSIS — R26.81 GAIT INSTABILITY: Primary | ICD-10-CM

## 2017-10-17 RX ORDER — ACETAMINOPHEN 500 MG
500 TABLET ORAL
Qty: 60 TAB | Refills: 3 | Status: SHIPPED | OUTPATIENT
Start: 2017-10-17 | End: 2021-08-04 | Stop reason: SDUPTHER

## 2017-10-17 NOTE — LETTER
10/24/2017 5:39 PM 
 
Ms. Savana Carvalho [address] [facility] 7 [phone] Dear Savana Barrera: Please find your most recent results below. Resulted Orders CBC W/O DIFF Result Value Ref Range WBC 7.0 3.4 - 10.8 x10E3/uL  
 RBC 4.36 3.77 - 5.28 x10E6/uL HGB 12.6 11.1 - 15.9 g/dL HCT 39.2 34.0 - 46.6 % MCV 90 79 - 97 fL  
 MCH 28.9 26.6 - 33.0 pg  
 MCHC 32.1 31.5 - 35.7 g/dL  
 RDW 15.7 (H) 12.3 - 15.4 % PLATELET 220 742 - 133 x10E3/uL Narrative Performed at:  [address]  [id number] : Denny Pandey MD, Phone:  [phone] METABOLIC PANEL, COMPREHENSIVE Result Value Ref Range Glucose 88 65 - 99 mg/dL BUN 16 8 - 27 mg/dL Creatinine 0.85 0.57 - 1.00 mg/dL GFR est non-AA 62 >59 mL/min/1.73 GFR est AA 72 >59 mL/min/1.73  
 BUN/Creatinine ratio 19 12 - 28 Sodium 144 134 - 144 mmol/L Potassium 4.4 3.5 - 5.2 mmol/L Chloride 104 96 - 106 mmol/L  
 CO2 29 18 - 29 mmol/L Calcium 8.9 8.7 - 10.3 mg/dL Protein, total 6.2 6.0 - 8.5 g/dL Albumin 3.6 3.5 - 4.7 g/dL GLOBULIN, TOTAL 2.6 1.5 - 4.5 g/dL A-G Ratio 1.4 1.2 - 2.2 Bilirubin, total 0.4 0.0 - 1.2 mg/dL Alk. phosphatase 67 39 - 117 IU/L  
 AST (SGOT) 17 0 - 40 IU/L  
 ALT (SGPT) 10 0 - 32 IU/L Narrative Performed at:  [address]  [id number] : Denny Pandey MD, Phone:  [phone] VITAMIN D, 25 HYDROXY Result Value Ref Range VITAMIN D, 25-HYDROXY 31.1 30.0 - 100.0 ng/mL Comment:  
   Vitamin D deficiency has been defined by the [facility] practice guideline as a 
level of serum 25-OH vitamin D less than 20 ng/mL (1,2). The Endocrine Society went on to further define vitamin D 
insufficiency as a level between 21 and 29 ng/mL (2). 1. IOM ([place] of Medicine). 2010. Dietary reference intakes for calcium and D. [facility]: The 
   Breaker. 2. Bong MF, Misti NC, John CRANE, et al. 
   Evaluation, treatment, and prevention of vitamin D 
   deficiency: an Endocrine Society clinical practice 
   guideline. JCEM. 2011 Jul; 96(7):1911-30. Narrative Performed at:  [address]  [id number] : Ivy Perez MD, Phone:  [phone] LDL, DIRECT Result Value Ref Range LDL,Direct 111 (H) 0 - 99 mg/dL Narrative Performed at:  [address]  [id number] : Ivy Perez MD, Phone:  [phone] RECOMMENDATIONS: 
 
LDL little high.watch fatty food. all other labs are stable. Please call me if you have any questions: [phone] Sincerely, Mike Rose MD

## 2017-10-17 NOTE — LETTER
10/24/2017 2:00 PM 
 
Ms. Rik Capone 
[address] [place] [address] [address] [facility] [phone] Dear Rik Capone: Please find your most recent results below. Resulted Orders CBC W/O DIFF Result Value Ref Range WBC 7.0 3.4 - 10.8 x10E3/uL  
 RBC 4.36 3.77 - 5.28 x10E6/uL HGB 12.6 11.1 - 15.9 g/dL HCT 39.2 34.0 - 46.6 % MCV 90 79 - 97 fL  
 MCH 28.9 26.6 - 33.0 pg  
 MCHC 32.1 31.5 - 35.7 g/dL  
 RDW 15.7 (H) 12.3 - 15.4 % PLATELET 180 662 - 742 x10E3/uL Narrative Performed at:  [address]  [id number] : Meng Servin MD, Phone:  [phone] METABOLIC PANEL, COMPREHENSIVE Result Value Ref Range Glucose 88 65 - 99 mg/dL BUN 16 8 - 27 mg/dL Creatinine 0.85 0.57 - 1.00 mg/dL GFR est non-AA 62 >59 mL/min/1.73 GFR est AA 72 >59 mL/min/1.73  
 BUN/Creatinine ratio 19 12 - 28 Sodium 144 134 - 144 mmol/L Potassium 4.4 3.5 - 5.2 mmol/L Chloride 104 96 - 106 mmol/L  
 CO2 29 18 - 29 mmol/L Calcium 8.9 8.7 - 10.3 mg/dL Protein, total 6.2 6.0 - 8.5 g/dL Albumin 3.6 3.5 - 4.7 g/dL GLOBULIN, TOTAL 2.6 1.5 - 4.5 g/dL A-G Ratio 1.4 1.2 - 2.2 Bilirubin, total 0.4 0.0 - 1.2 mg/dL Alk. phosphatase 67 39 - 117 IU/L  
 AST (SGOT) 17 0 - 40 IU/L  
 ALT (SGPT) 10 0 - 32 IU/L Narrative Performed at:  [address]  [id number] : Meng Servin MD, Phone:  [phone] VITAMIN D, 25 HYDROXY Result Value Ref Range VITAMIN D, 25-HYDROXY 31.1 30.0 - 100.0 ng/mL Comment:  
   Vitamin D deficiency has been defined by the [facility] practice guideline as a 
level of serum 25-OH vitamin D less than 20 ng/mL (1,2). The Endocrine Society went on to further define vitamin D 
insufficiency as a level between 21 and 29 ng/mL (2). 1. IOM ([place] of Medicine). 2010. Dietary reference intakes for calcium and D. [facility]: The 
   [employer]. 2. Bong MF, Misti NC, John CRANE, et al. 
   Evaluation, treatment, and prevention of vitamin D 
   deficiency: an Endocrine Society clinical practice 
   guideline. JCEM. 2011 Jul; 96(7):1911-30. Narrative Performed at:  [address]  [id number] : Cruz Patel MD, Phone:  [phone] LDL, DIRECT Result Value Ref Range LDL,Direct 111 (H) 0 - 99 mg/dL Narrative Performed at:  [address]  [id number] : Cruz Patel MD, Phone:  [phone] RECOMMENDATIONS: 
Dwana Edin your labs indicate that  your total cholesterol and LDL are elevated, please be on a low fat/cholesterol diet and exercise as tolerated. Please call me if you have any questions: [phone] Sincerely, Bonita Johnson MD

## 2017-10-17 NOTE — PROGRESS NOTES
HISTORY OF PRESENT ILLNESS  Sylvia Maurer is a 80 y.o. female here for evaluation for power scooter. She has hereditery chorea andCVA. walking is diffiuclt for her with jerky movement. She has difficulty walking with lack of co ordination which is getting worse over time. She use her walker to walk but she get exhausted to walk more than 25 feet with walker. She has to rest.Over last several months her ambulation is getting worse ,she need to rest every 25 feet of distance. She has lower back pain and balance and gait instability too. .She is unable to propel quad cane and rollator due to balance deficit. .Her get up and go test takes over several minutes minutes to do. Pt demonstrates good safety awareness,good spatial awareness and depth perception. She has good cognition,motor planning and good hand -eye coordination. Currently She is performing bathing,dressing and grooming tasks and IADLS at modifified level due to limited mobility. She lives alone in [facility] community. Currently she is depandant on  Walker but her legs shake a lot. A power wheel chair is in approprate pt has difficulties with joysticks. She demonstrates increased driving accuracy and steering capability with scooter with B/L hand and arm co ordination. Power scooter with help her to give mobility for eating meals in dining room and attend [employer] service,movie and grocery. It will decrease burden of care of family and increase socialization thus increasing quality of life. sc  Has chorea,better with addition of half klonopin in morning. also anxiety is better controlled. Has left hip pain which bothers often,not able to walk much. Hip Pain      Fall     Neurologic Problem         Review of Systems   Constitutional: Negative. HENT: Negative. Eyes: Negative. Respiratory: Negative. Cardiovascular: Negative. Gastrointestinal: Negative. Genitourinary: Negative. Musculoskeletal: Negative. Skin: Negative. Neurological: Positive for tremors. Endo/Heme/Allergies: Negative. Psychiatric/Behavioral: Positive for depression. The patient is nervous/anxious. Physical Exam   Constitutional: She appears well-developed and well-nourished. No distress. Neck: Normal range of motion. Neck supple. No JVD present. No thyromegaly present. Cardiovascular: Normal rate, regular rhythm, normal heart sounds and intact distal pulses. Pulmonary/Chest: Effort normal and breath sounds normal. No respiratory distress. She has no wheezes. Abdominal: Soft. Bowel sounds are normal. She exhibits no distension. There is no tenderness. KUB NT   Neurological: She displays abnormal reflex. She exhibits abnormal muscle tone. Psychiatric: She has a normal mood and affect. Her behavior is normal.   anxious       ASSESSMENT and PLAN  Diagnoses and all orders for this visit:    1. Gait instability    Pt need motorized power scooter to ambulate. Has high risk of fall. 2. Hereditary chorea ([address]  Seen by neurology. Will do,  -     CBC W/O DIFF  -     METABOLIC PANEL, COMPREHENSIVE    3. Cerebral thrombosis with cerebral infarction (HCC)    On ASA and statin. -     CBC W/O DIFF  -     METABOLIC PANEL, COMPREHENSIVE  -     LDL, DIRECT    4. Late onset Alzheimer's disease without behavioral disturbanc  On med. 5. Chronic obstructive pulmonary disease, unspecified COPD type ([address]  On inhaler. stable. 6. Vitamin D deficiency  -     VITAMIN D, 25 HYDROXY    7. Pain of left hip joint  -     acetaminophen (TYLENOL) 500 mg tablet; Take 1 Tab by mouth two (2) times daily as needed for Pain. 8. Glaucoma screening        Discussed expected course/resolution/complications of diagnosis in detail with patient. Medication risks/benefits/costs/interactions/alternatives discussed with patient. Pt was given an after visit summary which includes diagnoses, current medications & vitals.    Pt expressed understanding with the diagnosis and plan.

## 2017-10-17 NOTE — PROGRESS NOTES
Health Maintenance Due   Topic Date Due    DTaP/Tdap/Td series (1 - Tdap) 10/21/1951    GLAUCOMA SCREENING Q2Y  04/09/2017    INFLUENZA AGE 9 TO ADULT  08/01/2017       Chief Complaint   Patient presents with    Hip Pain     face to face for PT for scooter    Fall     being seen by PT for eval       1. Have you been to the ER, urgent care clinic since your last visit? Hospitalized since your last visit? No    2. Have you seen or consulted any other health care providers outside of the [address] [place] [address] since your last visit? Include any pap smears or colon screening. No    3) Do you have an Advance Directive on file? no    4) Are you interested in receiving information on Advance Directives? NO      Patient is accompanied by self I have received verbal consent from Gio Li to discuss any/all medical information while they are present in the room.

## 2017-10-17 NOTE — MR AVS SNAPSHOT
Visit Information Date & Time Provider Department Dept. Phone Encounter #  
 10/17/2017 11:45 AM Rea Proctor MD [facility] Internal Medicine [facility] [phone] [id number] Your Appointments 10/24/2017  1:15 PM  
Any with Rea Proctor MD  
[facility] ([facility]) Appt Note: follow up [address] [phone]  
  
   
 [address] [place] [address]. [address] [place] [phone] Upcoming Health Maintenance Date Due DTaP/Tdap/Td series (1 - Tdap) 10/21/1951 GLAUCOMA SCREENING Q2Y 4/9/2017 INFLUENZA AGE 9 TO ADULT 8/1/2017 MEDICARE YEARLY EXAM 6/21/2018 Allergies as of 10/17/2017  Review Complete On: 10/17/2017 By: Rea Proctor MD  
  
 Severity Noted Reaction Type Reactions Keflex [Cephalexin]  06/16/2011   Topical Rash Current Immunizations  Reviewed on 10/17/2017 Name Date Influenza Vaccine Split 9/16/2010 Pneumococcal Conjugate (PCV-13) 8/1/2017 Pneumococcal Polysaccharide (PPSV-23) 3/25/2014 ZZZ-RETIRED (DO NOT USE) Pneumococcal Vaccine (Unspecified Type) 6/16/2006 Zoster Vaccine, Live 7/21/2016, 7/9/2012 Reviewed by Rea Proctor MD on 10/17/2017 at 12:29 PM  
You Were Diagnosed With   
  
 Codes Comments Gait instability    -  Primary ICD-10-CM: R26.81 
ICD-9-CM: 781.2 Hereditary chorea (HCC)     ICD-10-CM: G10 
ICD-9-CM: 333.4 Cerebral thrombosis with cerebral infarction [facility])     ICD-10-CM: I63.30 ICD-9-CM: 434.01 Late onset Alzheimer's disease without behavioral disturbance     ICD-10-CM: G30.1, F02.80 ICD-9-CM: 331.0, 294.10 Chronic obstructive pulmonary disease, unspecified COPD type ([facility]     ICD-10-CM: J44.9 ICD-9-CM: 441 Vitamin D deficiency     ICD-10-CM: E55.9 ICD-9-CM: 268.9 Pain of left hip joint     ICD-10-CM: M25.552 ICD-9-CM: 719.45 Glaucoma screening     ICD-10-CM: Z13.5 ICD-9-CM: V80.1 Vitals BP Pulse Temp Resp Height(growth percentile) Weight(growth percentile)  
 137/76 (BP 1 Location: Left arm, BP Patient Position: Sitting) 84 97 °F (36.1 °C) (Oral) 20 5' 2\" (1.575 m) 155 lb (70.3 kg) SpO2 BMI OB Status Smoking Status 92% 28.35 kg/m2 Postmenopausal Former Smoker Vitals History BMI and BSA Data Body Mass Index Body Surface Area  
 28.35 kg/m 2 1.75 m 2 Preferred Pharmacy Pharmacy Name [facility] [phone] Your Updated Medication List  
  
   
This list is accurate as of: 10/17/17 12:29 PM.  Always use your most recent med list.  
  
  
  
  
 acetaminophen 500 mg tablet Commonly known as:  TYLENOL Take 1 Tab by mouth two (2) times daily as needed for Pain. aspirin delayed-release 81 mg tablet Commonly known as:  ECOTRIN LOW STRENGTH Take 1 Tab by mouth daily. azelastine 0.05 % ophthalmic solution Commonly known as:  OPTIVAR Administer 1 Drop to right eye two (2) times a day. Use in affected eye(s)  
  
 calcium citrate-vitamin d3 315-200 mg-unit Tab Commonly known as:  CITRACAL+D Take 1 Tab by mouth daily (with breakfast). citalopram 20 mg tablet Commonly known as:  Aleshia Meigs Take 1 Tab by mouth daily. D/C celexa 10 mg  
  
 clonazePAM 0.5 mg tablet Commonly known as:  KlonoPIN  
TAKE 1/2 TABLET IN THE MORNING AND TAKE 1 TABLET IN THE EVENING. fluticasone-vilanterol 100-25 mcg/dose inhaler Commonly known as:  BREO ELLIPTA Take 1 Puff by inhalation daily. meloxicam 7.5 mg tablet Commonly known as:  MOBIC Take 1 Tab by mouth daily. memantine 10 mg tablet Commonly known as:  Sandrita Daniels Take 1 Tab by mouth two (2) times a day. PATADAY 0.2 % Drop ophthalmic solution Generic drug:  olopatadine Prescriptions Sent to Pharmacy  Refills  
 acetaminophen (TYLENOL) 500 mg tablet 3  
 Sig: Take 1 Tab by mouth two (2) times daily as needed for Pain. Class: Normal  
 Pharmacy: [address] Ph #: [phone] Route: Oral  
  
We Performed the Following CBC W/O DIFF [62094 CPT(R)] LDL, DIRECT [id number] CPT(R)] METABOLIC PANEL, COMPREHENSIVE [07014 CPT(R)] VITAMIN D, 25 HYDROXY [id number] CPT(R)] Introducing [facility]! Jessica Osman introduces [employer] patient portal. Now you can access parts of your medical record, email your doctor's office, and request medication refills online. 1. In your internet browser, go to [email or web address] 2. Click on the First Time User? Click Here link in the Sign In box. You will see the New Member Sign Up page. 3. Enter your [employer] Access Code exactly as it appears below. You will not need to use this code after youve completed the sign-up process. If you do not sign up before the expiration date, you must request a new code. · [employer] Access Code: [email or web address] Expires: 1/15/2018 12:29 PM 
 
4. Enter the last four digits of your Social Security Number (xxxx) and Date of Birth (mm/dd/yyyy) as indicated and click Submit. You will be taken to the next sign-up page. 5. Create a [employer] ID. This will be your [employer] login ID and cannot be changed, so think of one that is secure and easy to remember. 6. Create a [employer] password. You can change your password at any time. 7. Enter your Password Reset Question and Answer. This can be used at a later time if you forget your password. 8. Enter your e-mail address. You will receive e-mail notification when new information is available in [address] 9. Click Sign Up. You can now view and download portions of your medical record. 10. Click the Download Summary menu link to download a portable copy of your medical information.  
 
If you have questions, please visit the Frequently Asked Questions section of the [employer]. Remember, [employer] is NOT to be used for urgent needs. For medical emergencies, dial 911. Now available from your iPhone and Android! Please provide this summary of care documentation to your next provider. Your primary care clinician is listed as Zari Quezada. If you have any questions after today's visit, please call [phone].

## 2017-10-18 LAB
25(OH)D3+25(OH)D2 SERPL-MCNC: 31.1 NG/ML (ref 30–100)
ALBUMIN SERPL-MCNC: 3.6 G/DL (ref 3.5–4.7)
ALBUMIN/GLOB SERPL: 1.4 {RATIO} (ref 1.2–2.2)
ALP SERPL-CCNC: 67 IU/L (ref 39–117)
ALT SERPL-CCNC: 10 IU/L (ref 0–32)
AST SERPL-CCNC: 17 IU/L (ref 0–40)
BILIRUB SERPL-MCNC: 0.4 MG/DL (ref 0–1.2)
BUN SERPL-MCNC: 16 MG/DL (ref 8–27)
BUN/CREAT SERPL: 19 (ref 12–28)
CALCIUM SERPL-MCNC: 8.9 MG/DL (ref 8.7–10.3)
CHLORIDE SERPL-SCNC: 104 MMOL/L (ref 96–106)
CO2 SERPL-SCNC: 29 MMOL/L (ref 18–29)
CREAT SERPL-MCNC: 0.85 MG/DL (ref 0.57–1)
ERYTHROCYTE [DISTWIDTH] IN BLOOD BY AUTOMATED COUNT: 15.7 % (ref 12.3–15.4)
GLOBULIN SER CALC-MCNC: 2.6 G/DL (ref 1.5–4.5)
GLUCOSE SERPL-MCNC: 88 MG/DL (ref 65–99)
HCT VFR BLD AUTO: 39.2 % (ref 34–46.6)
HGB BLD-MCNC: 12.6 G/DL (ref 11.1–15.9)
LDLC SERPL DIRECT ASSAY-MCNC: 111 MG/DL (ref 0–99)
MCH RBC QN AUTO: 28.9 PG (ref 26.6–33)
MCHC RBC AUTO-ENTMCNC: 32.1 G/DL (ref 31.5–35.7)
MCV RBC AUTO: 90 FL (ref 79–97)
PLATELET # BLD AUTO: 229 X10E3/UL (ref 150–379)
POTASSIUM SERPL-SCNC: 4.4 MMOL/L (ref 3.5–5.2)
PROT SERPL-MCNC: 6.2 G/DL (ref 6–8.5)
RBC # BLD AUTO: 4.36 X10E6/UL (ref 3.77–5.28)
SODIUM SERPL-SCNC: 144 MMOL/L (ref 134–144)
WBC # BLD AUTO: 7 X10E3/UL (ref 3.4–10.8)

## 2017-11-13 ENCOUNTER — TELEPHONE (OUTPATIENT)
Dept: INTERNAL MEDICINE CLINIC | Age: 82
End: 2017-11-13

## 2017-11-13 NOTE — TELEPHONE ENCOUNTER
Nurse from [facility] called to notify Dr. Eboni Babb patient had a fall last night but did not sustain any injuries.

## 2017-11-28 ENCOUNTER — OFFICE VISIT (OUTPATIENT)
Dept: INTERNAL MEDICINE CLINIC | Age: 82
End: 2017-11-28

## 2017-11-28 VITALS
HEART RATE: 55 BPM | WEIGHT: 156 LBS | TEMPERATURE: 97 F | DIASTOLIC BLOOD PRESSURE: 69 MMHG | SYSTOLIC BLOOD PRESSURE: 119 MMHG | BODY MASS INDEX: 28.71 KG/M2 | OXYGEN SATURATION: 93 % | HEIGHT: 62 IN | RESPIRATION RATE: 20 BRPM

## 2017-11-28 DIAGNOSIS — R26.9 GAIT ABNORMALITY: Primary | ICD-10-CM

## 2017-11-28 DIAGNOSIS — G10 HEREDITARY CHOREA (HCC): ICD-10-CM

## 2017-11-28 DIAGNOSIS — J44.9 CHRONIC OBSTRUCTIVE PULMONARY DISEASE, UNSPECIFIED COPD TYPE (HCC): ICD-10-CM

## 2017-11-28 DIAGNOSIS — F02.80 LATE ONSET ALZHEIMER'S DISEASE WITHOUT BEHAVIORAL DISTURBANCE (HCC): ICD-10-CM

## 2017-11-28 DIAGNOSIS — G30.1 LATE ONSET ALZHEIMER'S DISEASE WITHOUT BEHAVIORAL DISTURBANCE (HCC): ICD-10-CM

## 2017-11-28 DIAGNOSIS — E78.5 DYSLIPIDEMIA: ICD-10-CM

## 2017-11-28 NOTE — PROGRESS NOTES
HISTORY OF PRESENT ILLNESS  Ifrah Lara is a 80 y.o. female here with C/O gait and balance problem. She just finished up her physical therapy, balance is not improved. She was recommended to use power scooter. Her anxiety is better controlled now with Celexa and Klonopin. 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 Has dementia. stable. able to tolerate namenda well. Labs are reviewed. stable. Follow-up       Dementia      Anxiety         Review of Systems   HENT: Negative. Eyes: Negative. Respiratory: Negative. Cardiovascular: Negative. Gastrointestinal: Negative. Musculoskeletal: Positive for joint pain. Negative for falls. Skin: Negative. Neurological: Negative. Negative for loss of consciousness. Psychiatric/Behavioral: Positive for memory loss. The patient is nervous/anxious. Physical Exam   Constitutional: She appears well-developed and well-nourished. No distress. Neck: Normal range of motion. Neck supple. No JVD present. No thyromegaly present. Cardiovascular: Normal rate, regular rhythm, normal heart sounds and intact distal pulses. Pulmonary/Chest: Effort normal and breath sounds normal. No respiratory distress. She has no wheezes. She has no rales. Musculoskeletal: She exhibits tenderness. Neurological: Coordination abnormal.   Abnormal movement or arms and legs. No shuffling gait,no intention tremor. Psychiatric: Her behavior is normal. Judgment and thought content normal.         ASSESSMENT and PLAN      Diagnoses and all orders for this visit:    1. Gait abnormality    She finished her physical therapy, did not improve much. Still have gait and balance issue. Need an scooter to ambulate. Already she was evaluated for poor scooter, therapist are working with it. 2. Chronic obstructive pulmonary disease, unspecified COPD type ([address]    On inhaler nebulizer. Stable. 3. Dyslipidemia    Stable with medications.   4. Late onset Alzheimer's disease without behavioral disturbance    Stable on medicine. 5. Hereditary chorea ([address]    Controlled with Celexa and Klonopin. Discussed expected course/resolution/complications of diagnosis in detail with patient. Medication risks/benefits/costs/interactions/alternatives discussed with patient. Pt was given an after visit summary which includes diagnoses, current medications & vitals. Pt expressed understanding with the diagnosis and plan.

## 2017-11-28 NOTE — PROGRESS NOTES
HPI    Review of Systems   Constitutional: Negative. HENT: Negative. Eyes: Negative. Respiratory: Negative. Cardiovascular: Negative. Gastrointestinal: Negative. Genitourinary: Negative. Musculoskeletal: Positive for back pain and joint pain. Skin: Negative. Psychiatric/Behavioral: Positive for memory loss. Physical Exam   Constitutional: She appears well-developed and well-nourished. No distress. Neck: Normal range of motion. Neck supple. No JVD present. No thyromegaly present. Cardiovascular: Normal rate, regular rhythm, normal heart sounds and intact distal pulses. Pulmonary/Chest: Effort normal and breath sounds normal. No respiratory distress. She has no wheezes. Musculoskeletal: She exhibits no edema or tenderness. Lymphadenopathy:     She has no cervical adenopathy. Psychiatric: She has a normal mood and affect. Her behavior is normal.      HISTORY OF PRESENT ILLNESS  Savana Barrera is a 80 y.o. female here with C/O gait and balance problem. She just finished up her physical therapy, balance is not improved. She was recommended to use power scooter. Her anxiety is better controlled now with Celexa and Klonopin. 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 Has dementia. stable. able to tolerate namenda well. Labs are reviewed. stable. Follow-up       Dementia      Anxiety         Review of Systems   HENT: Negative. Eyes: Negative. Respiratory: Negative. Cardiovascular: Negative. Gastrointestinal: Negative. Musculoskeletal: Positive for joint pain. Negative for falls. Skin: Negative. Neurological: Negative. Negative for loss of consciousness. Psychiatric/Behavioral: Positive for memory loss. The patient is nervous/anxious. Physical Exam   Constitutional: She appears well-developed and well-nourished. No distress. Neck: Normal range of motion. Neck supple. No JVD present. No thyromegaly present.    Cardiovascular: Normal rate, regular rhythm, normal heart sounds and intact distal pulses. Pulmonary/Chest: Effort normal and breath sounds normal. No respiratory distress. She has no wheezes. She has no rales. Musculoskeletal: She exhibits tenderness. Right shoulder:tender. ROm OK. Neck;Spine NT.rom restricted   Neurological: Coordination abnormal.   Abnormal movement or arms and legs. No shuffling gait,no intention tremor. Psychiatric: Her behavior is normal. Judgment and thought content normal.   Anxious profoundly       ASSESSMENT and PLAN      Diagnoses and all orders for this visit:    1. Gait abnormality    She finished her physical therapy, did not improve much. Still have gait and balance issue. Need an scooter to ambulate. Already she was evaluated for poor scooter, therapist are working with it. 2. Chronic obstructive pulmonary disease, unspecified COPD type ([address]    On inhaler nebulizer. Stable. 3. Dyslipidemia    Stable with medications. 4. Late onset Alzheimer's disease without behavioral disturbance    Stable on medicine. 5. Hereditary chorea ([address]    Controlled with Celexa and Klonopin. Discussed expected course/resolution/complications of diagnosis in detail with patient. Medication risks/benefits/costs/interactions/alternatives discussed with patient. Pt was given an after visit summary which includes diagnoses, current medications & vitals. Pt expressed understanding with the diagnosis and plan.

## 2017-11-28 NOTE — MR AVS SNAPSHOT
Visit Information Date & Time Provider Department Dept. Phone Encounter #  
 11/28/2017 12:45 PM Ronnie Germain MD [facility] [phone] [id number] Your Appointments 2/13/2018 10:30 AM  
Any with MD Yazmin Robin ([facility]) Appt Note: follow up 3m; fu 4m  
 [address] [place] [address] [id number] [phone]  
  
   
 [address] [place] [address]. [address] [place] [id number] Upcoming Health Maintenance Date Due DTaP/Tdap/Td series (1 - Tdap) 10/21/1951 GLAUCOMA SCREENING Q2Y 4/9/2017 Influenza Age 5 to Adult 8/1/2017 MEDICARE YEARLY EXAM 6/21/2018 Allergies as of 11/28/2017  Review Complete On: 11/28/2017 By: Ronnie Germain MD  
  
 Severity Noted Reaction Type Reactions Keflex [Cephalexin]  06/16/2011   Topical Rash Current Immunizations  Reviewed on 11/28/2017 Name Date Influenza High Dose Vaccine PF 11/14/2017 Influenza Vaccine Split 9/16/2010 Pneumococcal Conjugate (PCV-13) 8/1/2017 Pneumococcal Polysaccharide (PPSV-23) 3/25/2014 ZZZ-RETIRED (DO NOT USE) Pneumococcal Vaccine (Unspecified Type) 6/16/2006 Zoster Vaccine, Live 7/21/2016, 7/9/2012 Reviewed by Josue Cantrell LPN on 23/75/1834 at  1:06 PM  
You Were Diagnosed With   
  
 Codes Comments Gait abnormality    -  Primary ICD-10-CM: R26.9 ICD-9-CM: 593. 2 Chronic obstructive pulmonary disease, unspecified COPD type ([facility]     ICD-10-CM: J44.9 ICD-9-CM: 620 Dyslipidemia     ICD-10-CM: E78.5 ICD-9-CM: 272.4 Late onset Alzheimer's disease without behavioral disturbance     ICD-10-CM: G30.1, F02.80 ICD-9-CM: 331.0, 294.10 Hereditary chorea (HCC)     ICD-10-CM: G10 
ICD-9-CM: 333.4 Vitals  BP Pulse Temp Resp Height(growth percentile) Weight(growth percentile)  
 119/69 (BP 1 Location: Left arm, BP Patient Position: Sitting) (!) 02 47 °F (36.1 °C) (Oral) 20 5' 2\" (1.575 m) 156 lb (70.8 kg) SpO2 BMI OB Status Smoking Status 93% 28.53 kg/m2 Postmenopausal Former Smoker Vitals History BMI and BSA Data Body Mass Index Body Surface Area 28.53 kg/m 2 1.76 m 2 Preferred Pharmacy Pharmacy Name Phone [facility] [phone] Your Updated Medication List  
  
   
This list is accurate as of: 11/28/17  1:36 PM.  Always use your most recent med list.  
  
  
  
  
 acetaminophen 500 mg tablet Commonly known as:  TYLENOL Take 1 Tab by mouth two (2) times daily as needed for Pain. aspirin delayed-release 81 mg tablet Commonly known as:  ECOTRIN LOW STRENGTH Take 1 Tab by mouth daily. azelastine 0.05 % ophthalmic solution Commonly known as:  OPTIVAR Administer 1 Drop to right eye two (2) times a day. Use in affected eye(s)  
  
 calcium citrate-vitamin d3 315-200 mg-unit Tab Commonly known as:  CITRACAL+D Take 1 Tab by mouth daily (with breakfast). citalopram 20 mg tablet Commonly known as:  Raymond Flatter Take 1 Tab by mouth daily. D/C celexa 10 mg  
  
 clonazePAM 0.5 mg tablet Commonly known as:  KlonoPIN  
TAKE 1/2 TABLET IN THE MORNING AND TAKE 1 TABLET IN THE EVENING. fluticasone-vilanterol 100-25 mcg/dose inhaler Commonly known as:  BREO ELLIPTA Take 1 Puff by inhalation daily. meloxicam 7.5 mg tablet Commonly known as:  MOBIC Take 1 Tab by mouth daily. memantine 10 mg tablet Commonly known as:  Wilson Brood Take 1 Tab by mouth two (2) times a day. PATADAY 0.2 % Drop ophthalmic solution Generic drug:  olopatadine Introducing [facility] & HEALTH SERVICES! [place] introduces Idiro patient portal. Now you can access parts of your medical record, email your doctor's office, and request medication refills online.    
 
1. In your internet browser, go to [email or web address] 2. Click on the First Time User? Click Here link in the Sign In box. You will see the New Member Sign Up page. 3. Enter your Blackboard Access Code exactly as it appears below. You will not need to use this code after youve completed the sign-up process. If you do not sign up before the expiration date, you must request a new code. · Blackboard Access Code: [email or web address] Expires: 1/15/2018 11:29 AM 
 
4. Enter the last four digits of your Social Security Number (xxxx) and Date of Birth (mm/dd/yyyy) as indicated and click Submit. You will be taken to the next sign-up page. 5. Create a [employer] ID. This will be your Blackboard login ID and cannot be changed, so think of one that is secure and easy to remember. 6. Create a Blackboard password. You can change your password at any time. 7. Enter your Password Reset Question and Answer. This can be used at a later time if you forget your password. 8. Enter your e-mail address. You will receive e-mail notification when new information is available in [address] 9. Click Sign Up. You can now view and download portions of your medical record. 10. Click the Download Summary menu link to download a portable copy of your medical information. If you have questions, please visit the Frequently Asked Questions section of the Blackboard website. Remember, Blackboard is NOT to be used for urgent needs. For medical emergencies, dial 911. Now available from your iPhone and Android! Please provide this summary of care documentation to your next provider. Your primary care clinician is listed as Ekta Houston. If you have any questions after today's visit, please call [phone].

## 2017-11-28 NOTE — PROGRESS NOTES
Health Maintenance Due   Topic Date Due    DTaP/Tdap/Td series (1 - Tdap) 10/21/1951    GLAUCOMA SCREENING Q2Y  04/09/2017    Influenza Age 5 to Adult  08/01/2017       Chief Complaint   Patient presents with    COPD    Cholesterol Problem    Dementia    Difficulty Walking     states her walking ability is not any better       1. Have you been to the ER, urgent care clinic since your last visit? Hospitalized since your last visit? No    2. Have you seen or consulted any other health care providers outside of the [address] [place] [address] since your last visit? Include any pap smears or colon screening. No    3) Do you have an Advance Directive on file? no    4) Are you interested in receiving information on Advance Directives? NO      Patient is accompanied by self I have received verbal consent from Kelley Ortiz to discuss any/all medical information while they are present in the room.

## 2017-12-13 DIAGNOSIS — G10 HEREDITARY CHOREA (HCC): ICD-10-CM

## 2017-12-13 RX ORDER — CLONAZEPAM 0.5 MG/1
TABLET ORAL
Qty: 45 TAB | Refills: 2 | Status: SHIPPED | OUTPATIENT
Start: 2017-12-13 | End: 2017-12-15 | Stop reason: SDUPTHER

## 2017-12-14 ENCOUNTER — TELEPHONE (OUTPATIENT)
Dept: INTERNAL MEDICINE CLINIC | Age: 82
End: 2017-12-14

## 2017-12-14 NOTE — TELEPHONE ENCOUNTER
Lali Landry from [employer] called and advised that pt needs prescription for Clonazepam.

## 2017-12-15 DIAGNOSIS — G10 HEREDITARY CHOREA (HCC): ICD-10-CM

## 2017-12-18 RX ORDER — CLONAZEPAM 0.5 MG/1
TABLET ORAL
Qty: 45 TAB | Refills: 2 | Status: SHIPPED | OUTPATIENT
Start: 2017-12-18 | End: 2018-04-16 | Stop reason: SDUPTHER

## 2017-12-21 DIAGNOSIS — G30.1 LATE ONSET ALZHEIMER'S DISEASE WITHOUT BEHAVIORAL DISTURBANCE (HCC): ICD-10-CM

## 2017-12-21 DIAGNOSIS — F02.80 LATE ONSET ALZHEIMER'S DISEASE WITHOUT BEHAVIORAL DISTURBANCE (HCC): ICD-10-CM

## 2017-12-21 RX ORDER — MEMANTINE HYDROCHLORIDE 10 MG/1
TABLET ORAL
Qty: 30 TAB | Refills: 5 | Status: SHIPPED | OUTPATIENT
Start: 2017-12-21 | End: 2018-05-08 | Stop reason: SDUPTHER

## 2018-02-13 ENCOUNTER — OFFICE VISIT (OUTPATIENT)
Dept: INTERNAL MEDICINE CLINIC | Age: 83
End: 2018-02-13

## 2018-02-13 VITALS
BODY MASS INDEX: 28.45 KG/M2 | TEMPERATURE: 98 F | RESPIRATION RATE: 20 BRPM | WEIGHT: 154.6 LBS | HEART RATE: 85 BPM | HEIGHT: 62 IN | SYSTOLIC BLOOD PRESSURE: 109 MMHG | DIASTOLIC BLOOD PRESSURE: 66 MMHG | OXYGEN SATURATION: 95 %

## 2018-02-13 DIAGNOSIS — R26.89 BALANCE PROBLEM: ICD-10-CM

## 2018-02-13 DIAGNOSIS — J06.9 URTI (ACUTE UPPER RESPIRATORY INFECTION): Primary | ICD-10-CM

## 2018-02-13 DIAGNOSIS — F02.80 LATE ONSET ALZHEIMER'S DISEASE WITHOUT BEHAVIORAL DISTURBANCE (HCC): ICD-10-CM

## 2018-02-13 DIAGNOSIS — R29.6 FREQUENT FALLS: ICD-10-CM

## 2018-02-13 DIAGNOSIS — J44.9 CHRONIC OBSTRUCTIVE PULMONARY DISEASE, UNSPECIFIED COPD TYPE (HCC): ICD-10-CM

## 2018-02-13 DIAGNOSIS — G30.1 LATE ONSET ALZHEIMER'S DISEASE WITHOUT BEHAVIORAL DISTURBANCE (HCC): ICD-10-CM

## 2018-02-13 DIAGNOSIS — I63.30 CEREBRAL THROMBOSIS WITH CEREBRAL INFARCTION (HCC): ICD-10-CM

## 2018-02-13 DIAGNOSIS — G10 HEREDITARY CHOREA (HCC): ICD-10-CM

## 2018-02-13 RX ORDER — FLUTICASONE FUROATE AND VILANTEROL 100; 25 UG/1; UG/1
1 POWDER RESPIRATORY (INHALATION) DAILY
Qty: 1 INHALER | Refills: 5 | Status: SHIPPED | OUTPATIENT
Start: 2018-02-13 | End: 2020-05-26 | Stop reason: SDUPTHER

## 2018-02-13 RX ORDER — DOXYCYCLINE 100 MG/1
100 TABLET ORAL 2 TIMES DAILY
Qty: 14 TAB | Refills: 0 | Status: SHIPPED | OUTPATIENT
Start: 2018-02-13 | End: 2018-05-08 | Stop reason: ALTCHOICE

## 2018-02-13 NOTE — MR AVS SNAPSHOT
[address] [place] [address] [id number] [phone] Patient: Surekha Flores MRN:  [id number] Visit Information Date & Time Provider Department Dept. Phone Encounter #  
 2/13/2018 10:30 AM Ivett Lozoya MD [facility] [phone] [id number] Upcoming Health Maintenance Date Due DTaP/Tdap/Td series (1 - Tdap) 10/21/1951 GLAUCOMA SCREENING Q2Y 4/9/2017 MEDICARE YEARLY EXAM 6/21/2018 Allergies as of 2/13/2018  Review Complete On: 2/13/2018 By: Ivett Lozoya MD  
  
 Severity Noted Reaction Type Reactions Keflex [Cephalexin]  06/16/2011   Topical Rash Current Immunizations  Reviewed on 11/28/2017 Name Date Influenza High Dose Vaccine PF 11/14/2017 Influenza Vaccine Split 9/16/2010 Pneumococcal Conjugate (PCV-13) 8/1/2017 Pneumococcal Polysaccharide (PPSV-23) 3/25/2014 ZZZ-RETIRED (DO NOT USE) Pneumococcal Vaccine (Unspecified Type) 6/16/2006 Zoster Vaccine, Live 7/21/2016, 7/9/2012 Not reviewed this visit You Were Diagnosed With   
  
 Codes Comments URTI (acute upper respiratory infection)    -  Primary ICD-10-CM: J06.9 ICD-9-CM: 465.9 Chronic obstructive pulmonary disease, unspecified COPD type ([facility]     ICD-10-CM: J44.9 ICD-9-CM: 024 Cerebral thrombosis with cerebral infarction [facility])     ICD-10-CM: I63.30 ICD-9-CM: 434.01 Late onset Alzheimer's disease without behavioral disturbance     ICD-10-CM: G30.1, F02.80 ICD-9-CM: 331.0, 294.10 Hereditary chorea (HCC)     ICD-10-CM: G10 
ICD-9-CM: 333.4 Vitals BP Pulse Temp Resp Height(growth percentile) Weight(growth percentile) 109/66 (BP 1 Location: Right arm, BP Patient Position: Sitting) 85 98 °F (36.7 °C) (Oral) 20 5' 2\" (1.575 m) 154 lb 9.6 oz (70.1 kg) SpO2 BMI OB Status Smoking Status 95% 28.28 kg/m2 Postmenopausal Former Smoker Vitals History BMI and BSA Data Body Mass Index Body Surface Area  
 28.28 kg/m 2 1.75 m 2 Preferred Pharmacy Pharmacy Name Phone [facility] [phone] Your Updated Medication List  
  
   
This list is accurate as of: 2/13/18 11:13 AM.  Always use your most recent med list.  
  
  
  
  
 acetaminophen 500 mg tablet Commonly known as:  TYLENOL Take 1 Tab by mouth two (2) times daily as needed for Pain. aspirin delayed-release 81 mg tablet Commonly known as:  ECOTRIN LOW STRENGTH Take 1 Tab by mouth daily. azelastine 0.05 % ophthalmic solution Commonly known as:  OPTIVAR Administer 1 Drop to right eye two (2) times a day. Use in affected eye(s)  
  
 calcium citrate-vitamin d3 315-200 mg-unit Tab Commonly known as:  CITRACAL+D Take 1 Tab by mouth daily (with breakfast). citalopram 20 mg tablet Commonly known as:  Sahra Wayne Take 1 Tab by mouth daily. D/C celexa 10 mg  
  
 clonazePAM 0.5 mg tablet Commonly known as:  KlonoPIN  
TAKE 1/2 TABLET IN THE MORNING AND TAKE 1 TABLET IN THE EVENING. doxycycline 100 mg tablet Commonly known as:  ADOXA Take 1 Tab by mouth two (2) times a day. fluticasone-vilanterol 100-25 mcg/dose inhaler Commonly known as:  BREO ELLIPTA Take 1 Puff by inhalation daily. meloxicam 7.5 mg tablet Commonly known as:  MOBIC Take 1 Tab by mouth daily. memantine 10 mg tablet Commonly known as:  Jamari Reel TAKE 1 TABLET BY MOUTH DAILY. PATADAY 0.2 % Drop ophthalmic solution Generic drug:  olopatadine Prescriptions Sent to Pharmacy Refills  
 fluticasone-vilanterol (BREO ELLIPTA) 100-25 mcg/dose inhaler 5 Sig: Take 1 Puff by inhalation daily. Class: Normal  
 Pharmacy: [address] Ph #: [phone] Route: Inhalation doxycycline (ADOXA) 100 mg tablet 0 Sig: Take 1 Tab by mouth two (2) times a day. Class: Normal  
 Pharmacy: [address] [place] [address]   #: [phone] Route: Oral  
  
Introducing hospitals & HEALTH SERVICES! [place] introduces [employer] patient portal. Now you can access parts of your medical record, email your doctor's office, and request medication refills online. 1. In your internet browser, go to [email or web address] 2. Click on the First Time User? Click Here link in the Sign In box. You will see the New Member Sign Up page. 3. Enter your GlobalServe Access Code exactly as it appears below. You will not need to use this code after youve completed the sign-up process. If you do not sign up before the expiration date, you must request a new code. · [employer]Serve Access Code: [email or web address] Expires: 5/14/2018 11:13 AM 
 
4. Enter the last four digits of your Social Security Number (xxxx) and Date of Birth (mm/dd/yyyy) as indicated and click Submit. You will be taken to the next sign-up page. 5. Create a GlobalServe ID. This will be your GlobalServe login ID and cannot be changed, so think of one that is secure and easy to remember. 6. Create a [employer]Serve password. You can change your password at any time. 7. Enter your Password Reset Question and Answer. This can be used at a later time if you forget your password. 8. Enter your e-mail address. You will receive e-mail notification when new information is available in [address] 9. Click Sign Up. You can now view and download portions of your medical record. 10. Click the Download Summary menu link to download a portable copy of your medical information. If you have questions, please visit the Frequently Asked Questions section of the GlobalServe website. Remember, GlobalServe is NOT to be used for urgent needs. For medical emergencies, dial 911. Now available from your iPhone and Android! Please provide this summary of care documentation to your next provider. Your primary care clinician is listed as Cindy Gunn. If you have any questions after today's visit, please call [phone].

## 2018-02-13 NOTE — PROGRESS NOTES
Health Maintenance Due   Topic Date Due    DTaP/Tdap/Td series (1 - Tdap) 10/21/1951    GLAUCOMA SCREENING Q2Y  04/09/2017       Chief Complaint   Patient presents with    COPD     4 month follow up    Cholesterol Problem    Dementia    Cold Symptoms     nasal congestion with dry cough       1. Have you been to the ER, urgent care clinic since your last visit? Hospitalized since your last visit? No    2. Have you seen or consulted any other health care providers outside of the [address] [place] [address] since your last visit? Include any pap smears or colon screening. No    3) Do you have an Advance Directive on file? no    4) Are you interested in receiving information on Advance Directives? NO      Patient is accompanied by self I have received verbal consent from Bill Moctezuma to discuss any/all medical information while they are present in the room.

## 2018-02-13 NOTE — PROGRESS NOTES
HISTORY OF PRESENT ILLNESS  Tiara Farias is a 80 y.o. female. Tiara Farias is a 80 y.o. female here for follow-up. Report nasal congestion and postnasal drip for last 7 days. The cough is getting worse. No wheezing or shortness of breath. As per her daughter her gait and balance is getting worse. She is falling frequently. 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 Has dementia. stable. able to tolerate namenda well. Labs are reviewed. stable. HPI    Review of Systems   Constitutional: Negative. HENT: Positive for congestion. Negative for sore throat. Eyes: Negative. Respiratory: Positive for cough. Negative for shortness of breath. Cardiovascular: Negative. Gastrointestinal: Negative. Genitourinary: Negative. Musculoskeletal: Negative. Skin: Negative. Psychiatric/Behavioral: The patient is nervous/anxious. Physical Exam  Follow-up       Dementia      Anxiety         Review of Systems   HENT: Negative. Eyes: Negative. Respiratory: Negative. Cardiovascular: Negative. Gastrointestinal: Negative. Musculoskeletal: Positive for joint pain. Negative for falls. Skin: Negative. Neurological: Negative. Negative for loss of consciousness. Psychiatric/Behavioral: Positive for memory loss. The patient is nervous/anxious. Physical Exam   Constitutional: She appears well-developed and well-nourished. No distress. Neck: Normal range of motion. Neck supple. No JVD present. No thyromegaly present. Cardiovascular: Normal rate, regular rhythm, normal heart sounds and intact distal pulses. Pulmonary/Chest: Effort normal and breath sounds normal. No respiratory distress. She has no wheezes. She has no rales. Musculoskeletal: She exhibits tenderness. Neurological: Coordination abnormal.   Abnormal movement or arms and legs.       Psychiatric: Her behavior is normal. Judgment and thought content normal.         ASSESSMENT and PLAN    Diagnoses and all orders for this visit:    1. URTI (acute upper respiratory infection)  Rest and fluid. Will call in,    -     doxycycline (ADOXA) 100 mg tablet; Take 1 Tab by mouth two (2) times a day. 2. Chronic obstructive pulmonary disease, unspecified COPD type ([facility]     she is not using inhaler as needed. Will call in,      -     fluticasone-vilanterol (BREO ELLIPTA) 100-25 mcg/dose inhaler; Take 1 Puff by inhalation daily. 3. Cerebral thrombosis with cerebral infarction ([facility]    On ASA. 4. Late onset Alzheimer's disease without behavioral disturbance    On Namenda. Stable. 5. Hereditary [facility]a ([facility]  Controlled on Klonopin. 6. Balance problem   Will refer,    -     [address]    7. Frequent falls  -     [address]      Discussed expected course/resolution/complications of diagnosis in detail with patient. Medication risks/benefits/costs/interactions/alternatives discussed with patient. Pt was given an after visit summary which includes diagnoses, current medications & vitals. Pt expressed understanding with the diagnosis and plan.

## 2018-04-16 DIAGNOSIS — G10 HEREDITARY CHOREA (HCC): ICD-10-CM

## 2018-04-17 RX ORDER — CLONAZEPAM 0.5 MG/1
TABLET ORAL
Qty: 45 TAB | Refills: 2 | Status: SHIPPED | OUTPATIENT
Start: 2018-04-17 | End: 2018-07-17 | Stop reason: SDUPTHER

## 2018-05-08 ENCOUNTER — OFFICE VISIT (OUTPATIENT)
Dept: INTERNAL MEDICINE CLINIC | Age: 83
End: 2018-05-08

## 2018-05-08 VITALS
HEART RATE: 94 BPM | DIASTOLIC BLOOD PRESSURE: 83 MMHG | BODY MASS INDEX: 28.89 KG/M2 | RESPIRATION RATE: 20 BRPM | SYSTOLIC BLOOD PRESSURE: 127 MMHG | OXYGEN SATURATION: 92 % | HEIGHT: 62 IN | WEIGHT: 157 LBS | TEMPERATURE: 98 F

## 2018-05-08 DIAGNOSIS — F02.80 LATE ONSET ALZHEIMER'S DISEASE WITHOUT BEHAVIORAL DISTURBANCE (HCC): Primary | ICD-10-CM

## 2018-05-08 DIAGNOSIS — E55.9 VITAMIN D DEFICIENCY: ICD-10-CM

## 2018-05-08 DIAGNOSIS — G10 HEREDITARY CHOREA (HCC): ICD-10-CM

## 2018-05-08 DIAGNOSIS — R26.9 GAIT ABNORMALITY: ICD-10-CM

## 2018-05-08 DIAGNOSIS — J44.9 CHRONIC OBSTRUCTIVE PULMONARY DISEASE, UNSPECIFIED COPD TYPE (HCC): ICD-10-CM

## 2018-05-08 DIAGNOSIS — G30.1 LATE ONSET ALZHEIMER'S DISEASE WITHOUT BEHAVIORAL DISTURBANCE (HCC): Primary | ICD-10-CM

## 2018-05-08 DIAGNOSIS — I63.30 CEREBRAL THROMBOSIS WITH CEREBRAL INFARCTION (HCC): ICD-10-CM

## 2018-05-08 RX ORDER — MEMANTINE HYDROCHLORIDE 10 MG/1
10 TABLET ORAL 2 TIMES DAILY
Qty: 60 TAB | Refills: 5 | Status: SHIPPED | OUTPATIENT
Start: 2018-05-08 | End: 2018-06-04 | Stop reason: SDUPTHER

## 2018-05-08 NOTE — PROGRESS NOTES
Health Maintenance Due   Topic Date Due    DTaP/Tdap/Td series (1 - Tdap) 10/21/1951    GLAUCOMA SCREENING Q2Y  04/09/2017       Chief Complaint   Patient presents with    Gait Problem     wants to discuss motorized scooter    Knee Pain     knee pain while ambulation       1. Have you been to the ER, urgent care clinic since your last visit? Hospitalized since your last visit? No    2. Have you seen or consulted any other health care providers outside of the [facility] since your last visit? Include any pap smears or colon screening. No    3) Do you have an Advance Directive on file? no    4) Are you interested in receiving information on Advance Directives? NO      Patient is accompanied by self I have received verbal consent from Chandler Moss to discuss any/all medical information while they are present in the room.

## 2018-05-08 NOTE — PROGRESS NOTES
Kodak Foster is a 80 y.o. female. HPI    ROS    Physical Exam      HISTORY OF PRESENT ILLNESS  Kodak Foster is a 80 y.o. female here with C/O worsening memory. She is using Namenda without any problem. Has gait and balance problem. She just finished up her physical therapy, balance is not improved. She was recommended to use power scooter. Her anxiety is better controlled now with Celexa and Klonopin. 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 Labs are reviewed. Need lab work. .  Follow-up       Dementia      Anxiety         Review of Systems   HENT: Negative. Eyes: Negative. Respiratory: Negative. Cardiovascular: Negative. Gastrointestinal: Negative. Musculoskeletal: Positive for joint pain. Negative for falls. Skin: Negative. Neurological: Negative. Negative for loss of consciousness. Psychiatric/Behavioral: Positive for memory loss. The patient is nervous/anxious. Physical Exam   Constitutional: She appears well-developed and well-nourished. No distress. Neck: Normal range of motion. Neck supple. No JVD present. No thyromegaly present. Cardiovascular: Normal rate, regular rhythm, normal heart sounds and intact distal pulses. Pulmonary/Chest: Effort normal and breath sounds normal. No respiratory distress. She has no wheezes. She has no rales. Musculoskeletal: She exhibits tenderness. Neurological: Coordination abnormal.   Abnormal movement or arms and legs. No shuffling gait,no intention tremor. Psychiatric: Her behavior is normal. Judgment and thought content normal.         ASSESSMENT and PLAN      Diagnoses and all orders for this visit:    1. Late onset Alzheimer's disease without behavioral disturbance    Memory is slowly declining. Will increase,  -     memantine (NAMENDA) 10 mg tablet; Take 1 Tab by mouth two (2) times a day. -     METABOLIC PANEL, COMPREHENSIVE    2.  Chronic obstructive pulmonary disease, unspecified COPD type ([facility]    On inhaler. Stable. Will check,  -     CBC W/O DIFF    3. Hereditary chorea (HCC)    Controlled with Klonopin and Celexa. Stable    4. Cerebral thrombosis with cerebral infarction ([address]    On aspirin. Will check,  -     METABOLIC PANEL, COMPREHENSIVE    5. Vitamin D deficiency  -     VITAMIN D, 25 HYDROXY    6. Gait abnormality    Using walker, sometimes is easier if she can have an electronic wheelchair. Has history of Chorea and  stroke. Not able to function well with walker. We will refer to her to a therapist for reevaluation. Discussed expected course/resolution/complications of diagnosis in detail with patient. Medication risks/benefits/costs/interactions/alternatives discussed with patient. Pt was given an after visit summary which includes diagnoses, current medications & vitals. Pt expressed understanding with the diagnosis and plan.

## 2018-05-08 NOTE — LETTER
5/15/2018 11:46 AM 
 
Ms. Daysi Carvalho [address] [facility] [address] Dear Daysi Tyler: Please find your most recent results below. Resulted Orders METABOLIC PANEL, COMPREHENSIVE Result Value Ref Range Glucose 109 (H) 65 - 99 mg/dL BUN 16 8 - 27 mg/dL Creatinine 0.88 0.57 - 1.00 mg/dL GFR est non-AA 59 (L) >59 mL/min/1.73 GFR est AA 68 >59 mL/min/1.73  
 BUN/Creatinine ratio 18 12 - 28 Sodium 144 134 - 144 mmol/L Potassium 4.2 3.5 - 5.2 mmol/L Chloride 107 (H) 96 - 106 mmol/L  
 CO2 26 18 - 29 mmol/L Calcium 9.2 8.7 - 10.3 mg/dL Protein, total 6.6 6.0 - 8.5 g/dL Albumin 3.8 3.5 - 4.7 g/dL GLOBULIN, TOTAL 2.8 1.5 - 4.5 g/dL A-G Ratio 1.4 1.2 - 2.2 Bilirubin, total 0.4 0.0 - 1.2 mg/dL Alk. phosphatase 67 39 - 117 IU/L  
 AST (SGOT) 19 0 - 40 IU/L  
 ALT (SGPT) 9 0 - 32 IU/L Narrative Performed at:  [address]  [id number] : Ian Gamboa MD, Phone:  [phone] CBC W/O DIFF Result Value Ref Range WBC 7.2 3.4 - 10.8 x10E3/uL  
 RBC 4.51 3.77 - 5.28 x10E6/uL HGB 12.8 11.1 - 15.9 g/dL HCT 39.5 34.0 - 46.6 % MCV 88 79 - 97 fL  
 MCH 28.4 26.6 - 33.0 pg  
 MCHC 32.4 31.5 - 35.7 g/dL  
 RDW 15.7 (H) 12.3 - 15.4 % PLATELET 681 386 - 696 x10E3/uL Narrative Performed at:  [address]  [id number] : Ian Gamboa MD, Phone:  [phone] VITAMIN D, 25 HYDROXY Result Value Ref Range VITAMIN D, 25-HYDROXY 34.7 30.0 - 100.0 ng/mL Comment:  
   Vitamin D deficiency has been defined by the [facility] practice guideline as a 
level of serum 25-OH vitamin D less than 20 ng/mL (1,2). The Endocrine Society went on to further define vitamin D 
insufficiency as a level between 21 and 29 ng/mL (2). 1. IOM ([place] [employer]). 2010. Dietary reference intakes for calcium and D. [address] [place] [address]: The 
   "[employer]". 2. Bong MF, Misti NC, John CRANE, et al. 
   Evaluation, treatment, and prevention of vitamin D 
   deficiency: an Endocrine Society clinical practice 
   guideline. JCEM. 2011 Jul; 96(7):1911-30. Narrative Performed at:  [address]  [id number] : Glenys Barrera MD, Phone:  [phone] CKD REPORT Result Value Ref Range Interpretation Note Comment:  
   Supplemental report is available. Narrative Performed at:  [address] [address] [address]  [id number] : Boubacar Becker MD, Phone:  [phone] RECOMMENDATIONS: 
None. Keep up the good work! Please call me if you have any questions: [phone] Sincerely, Saritha Ruelas MD

## 2018-05-08 NOTE — MR AVS SNAPSHOT
[address]. A [facility] [address] 
[phone] Patient: Vicente Pelayo MRN:  GV Visit Information Date & Time Provider Department Dept. Phone Encounter #  
 2018  1:15 PM Amanda Weiner MD [facility] Internal Medicine [facility] [phone] [id number] Your Appointments 2018  1:30 PM  
Any with Amanda Weiner MD  
[facility] ([facility]) Appt Note: follow up 4m [address] [place] [address]. [facility] [address]  
[phone]  
  
   
 [address] [place] [address]. [address] [place] [phone] Upcoming Health Maintenance Date Due DTaP/Tdap/Td series (1 - Tdap) 10/21/1951 GLAUCOMA SCREENING Q2Y 2017 MEDICARE YEARLY EXAM 2018 Influenza Age 5 to Adult 2018 Allergies as of 2018  Review Complete On: 2018 By: Amanda Weiner MD  
  
 Severity Noted Reaction Type Reactions Keflex [Cephalexin]  2011   Topical Rash Current Immunizations  Reviewed on 2017 Name Date Influenza High Dose Vaccine PF 2017 Influenza Vaccine Split 2010 Pneumococcal Conjugate (PCV-13) 2017 Pneumococcal Polysaccharide (PPSV-23) 3/25/2014 ZZZ-RETIRED (DO NOT USE) Pneumococcal Vaccine (Unspecified Type) 2006 Zoster Vaccine, Live 2016, 2012 Not reviewed this visit You Were Diagnosed With   
  
 Codes Comments Late onset Alzheimer's disease without behavioral disturbance    -  Primary ICD-10-CM: G30.1, F02.80 ICD-9-CM: 331.0, 294.10 Chronic obstructive pulmonary disease, unspecified COPD type ([facility]     ICD-10-CM: J44.9 ICD-9-CM: 528 Hereditary chorea (HCC)     ICD-10-CM: G10 
ICD-9-CM: 333.4 Cerebral thrombosis with cerebral infarction [facility])     ICD-10-CM: I63.30 ICD-9-CM: 434.01 Vitamin D deficiency     ICD-10-CM: E55.9 ICD-9-CM: 268.9 Gait abnormality     ICD-10-CM: R26.9 ICD-9-CM: 554. 2 Vitals BP Pulse Temp Resp Height(growth percentile) Weight(growth percentile) 127/83 (BP 1 Location: Left arm, BP Patient Position: Sitting) 94 98 °F (36.7 °C) (Oral) 20 5' 2\" (1.575 m) 157 lb (71.2 kg) SpO2 BMI OB Status Smoking Status 92% 28.72 kg/m2 Postmenopausal Former Smoker Vitals History BMI and BSA Data Body Mass Index Body Surface Area 28.72 kg/m 2 1.76 m 2 Preferred Pharmacy Pharmacy Name Phone [id number], [address] Joanna Arellano [phone] Your Updated Medication List  
  
   
This list is accurate as of 5/8/18  1:51 PM.  Always use your most recent med list.  
  
  
  
  
 acetaminophen 500 mg tablet Commonly known as:  TYLENOL Take 1 Tab by mouth two (2) times daily as needed for Pain. aspirin delayed-release 81 mg tablet Commonly known as:  ECOTRIN LOW STRENGTH Take 1 Tab by mouth daily. azelastine 0.05 % ophthalmic solution Commonly known as:  OPTIVAR Administer 1 Drop to right eye two (2) times a day. Use in affected eye(s)  
  
 calcium citrate-vitamin d3 315-200 mg-unit Tab Commonly known as:  CITRACAL+D Take 1 Tab by mouth daily (with breakfast). citalopram 20 mg tablet Commonly known as:  Allegra Perking Take 1 Tab by mouth daily. D/C celexa 10 mg  
  
 clonazePAM 0.5 mg tablet Commonly known as:  KlonoPIN  
TAKE 1/2 TABLET IN THE MORNING AND TAKE 1 TABLET IN THE EVENING. fluticasone-vilanterol 100-25 mcg/dose inhaler Commonly known as:  BREO ELLIPTA Take 1 Puff by inhalation daily. meloxicam 7.5 mg tablet Commonly known as:  MOBIC Take 1 Tab by mouth daily. memantine 10 mg tablet Commonly known as:  Rona Brittney Take 1 Tab by mouth two (2) times a day. PATADAY 0.2 % Drop ophthalmic solution Generic drug:  olopatadine Prescriptions Sent to Pharmacy Refills memantine (NAMENDA) 10 mg tablet 5 Sig: Take 1 Tab by mouth two (2) times a day. Class: Normal  
 Pharmacy: [facility]pharmacy [address] [place] [address] #: [phone] Route: Oral  
  
We Performed the Following CBC W/O DIFF [56206 CPT(R)] METABOLIC PANEL, COMPREHENSIVE [17865 CPT(R)] VITAMIN D, 25 HYDROXY [id number] CPT(R)] Introducing [address]! [place] [facility] [facility] introduces [employer] patient portal. Now you can access parts of your medical record, email your doctor's office, and request medication refills online. 1. In your internet browser, go to [email or web address] 2. Click on the First Time User? Click Here link in the Sign In box. You will see the New Member Sign Up page. 3. Enter your [employer] Access Code exactly as it appears below. You will not need to use this code after youve completed the sign-up process. If you do not sign up before the expiration date, you must request a new code. · [employer] Access Code: [email or web address] Expires: 5/14/2018 12:13 PM 
 
4. Enter the last four digits of your Social Security Number (xxxx) and Date of Birth (mm/dd/yyyy) as indicated and click Submit. You will be taken to the next sign-up page. 5. Create a [employer] ID. This will be your [employer] login ID and cannot be changed, so think of one that is secure and easy to remember. 6. Create a [employer] password. You can change your password at any time. 7. Enter your Password Reset Question and Answer. This can be used at a later time if you forget your password. 8. Enter your e-mail address. You will receive e-mail notification when new information is available in [address] 9. Click Sign Up. You can now view and download portions of your medical record. 10. Click the Download Summary menu link to download a portable copy of your medical information.  
 
If you have questions, please visit the Frequently Asked Questions section of the [employer]. Remember, [employer] is NOT to be used for urgent needs. For medical emergencies, dial 911. Now available from your iPhone and Android! Please provide this summary of care documentation to your next provider. Your primary care clinician is listed as Gabby Dawson. If you have any questions after today's visit, please call [phone].

## 2018-05-09 LAB
25(OH)D3+25(OH)D2 SERPL-MCNC: 34.7 NG/ML (ref 30–100)
ALBUMIN SERPL-MCNC: 3.8 G/DL (ref 3.5–4.7)
ALBUMIN/GLOB SERPL: 1.4 {RATIO} (ref 1.2–2.2)
ALP SERPL-CCNC: 67 IU/L (ref 39–117)
ALT SERPL-CCNC: 9 IU/L (ref 0–32)
AST SERPL-CCNC: 19 IU/L (ref 0–40)
BILIRUB SERPL-MCNC: 0.4 MG/DL (ref 0–1.2)
BUN SERPL-MCNC: 16 MG/DL (ref 8–27)
BUN/CREAT SERPL: 18 (ref 12–28)
CALCIUM SERPL-MCNC: 9.2 MG/DL (ref 8.7–10.3)
CHLORIDE SERPL-SCNC: 107 MMOL/L (ref 96–106)
CO2 SERPL-SCNC: 26 MMOL/L (ref 18–29)
CREAT SERPL-MCNC: 0.88 MG/DL (ref 0.57–1)
ERYTHROCYTE [DISTWIDTH] IN BLOOD BY AUTOMATED COUNT: 15.7 % (ref 12.3–15.4)
GFR SERPLBLD CREATININE-BSD FMLA CKD-EPI: 59 ML/MIN/1.73
GFR SERPLBLD CREATININE-BSD FMLA CKD-EPI: 68 ML/MIN/1.73
GLOBULIN SER CALC-MCNC: 2.8 G/DL (ref 1.5–4.5)
GLUCOSE SERPL-MCNC: 109 MG/DL (ref 65–99)
HCT VFR BLD AUTO: 39.5 % (ref 34–46.6)
HGB BLD-MCNC: 12.8 G/DL (ref 11.1–15.9)
INTERPRETATION: NORMAL
MCH RBC QN AUTO: 28.4 PG (ref 26.6–33)
MCHC RBC AUTO-ENTMCNC: 32.4 G/DL (ref 31.5–35.7)
MCV RBC AUTO: 88 FL (ref 79–97)
PLATELET # BLD AUTO: 243 X10E3/UL (ref 150–379)
POTASSIUM SERPL-SCNC: 4.2 MMOL/L (ref 3.5–5.2)
PROT SERPL-MCNC: 6.6 G/DL (ref 6–8.5)
RBC # BLD AUTO: 4.51 X10E6/UL (ref 3.77–5.28)
SODIUM SERPL-SCNC: 144 MMOL/L (ref 134–144)
WBC # BLD AUTO: 7.2 X10E3/UL (ref 3.4–10.8)

## 2018-06-04 DIAGNOSIS — G30.1 LATE ONSET ALZHEIMER'S DISEASE WITHOUT BEHAVIORAL DISTURBANCE (HCC): ICD-10-CM

## 2018-06-04 DIAGNOSIS — F02.80 LATE ONSET ALZHEIMER'S DISEASE WITHOUT BEHAVIORAL DISTURBANCE (HCC): ICD-10-CM

## 2018-06-04 RX ORDER — MEMANTINE HYDROCHLORIDE 10 MG/1
10 TABLET ORAL 2 TIMES DAILY
Qty: 180 TAB | Refills: 1 | Status: SHIPPED | OUTPATIENT
Start: 2018-06-04 | End: 2018-09-11 | Stop reason: SDUPTHER

## 2018-06-12 ENCOUNTER — OFFICE VISIT (OUTPATIENT)
Dept: INTERNAL MEDICINE CLINIC | Age: 83
End: 2018-06-12

## 2018-06-12 VITALS
OXYGEN SATURATION: 95 % | SYSTOLIC BLOOD PRESSURE: 126 MMHG | HEART RATE: 91 BPM | RESPIRATION RATE: 20 BRPM | TEMPERATURE: 98 F | HEIGHT: 62 IN | BODY MASS INDEX: 29.26 KG/M2 | WEIGHT: 159 LBS | DIASTOLIC BLOOD PRESSURE: 72 MMHG

## 2018-06-12 DIAGNOSIS — G30.1 LATE ONSET ALZHEIMER'S DISEASE WITHOUT BEHAVIORAL DISTURBANCE (HCC): ICD-10-CM

## 2018-06-12 DIAGNOSIS — J96.11 CHRONIC RESPIRATORY FAILURE WITH HYPOXIA (HCC): ICD-10-CM

## 2018-06-12 DIAGNOSIS — F02.80 LATE ONSET ALZHEIMER'S DISEASE WITHOUT BEHAVIORAL DISTURBANCE (HCC): ICD-10-CM

## 2018-06-12 DIAGNOSIS — Z00.00 MEDICARE ANNUAL WELLNESS VISIT, SUBSEQUENT: ICD-10-CM

## 2018-06-12 DIAGNOSIS — R26.9 GAIT ABNORMALITY: ICD-10-CM

## 2018-06-12 DIAGNOSIS — G10 HEREDITARY CHOREA (HCC): ICD-10-CM

## 2018-06-12 DIAGNOSIS — J44.9 CHRONIC OBSTRUCTIVE PULMONARY DISEASE, UNSPECIFIED COPD TYPE (HCC): Primary | ICD-10-CM

## 2018-06-12 NOTE — PATIENT INSTRUCTIONS
Schedule of Personalized Health Plan  (Provide Copy to Patient)  The best way to stay healthy is to live a healthy lifestyle. A healthy lifestyle includes regular exercise, eating a well-balanced diet, keeping a healthy weight and not smoking. Regular physical exams and screening tests are another important way to take care of yourself. Preventive exams provided by health care providers can find health problems early when treatment works best and can keep you from getting certain diseases or illnesses. Preventive services include exams, lab tests, screenings, shots, monitoring and information to help you take care of your own health. All people over 65 should have a pneumonia shot. Pneumonia shots are usually only needed once in a lifetime unless your doctor decides differently. All people over 65 should have a yearly flu shot. People over 65 are at medium to high risk for Hepatitis B. Three shots are needed for complete protection. In addition to your physical exam, some screening tests are recommended:    Bone mass measurement (dexa scan) is recommended every two years. up to date  Diabetes Mellitus screening is recommended every year. up to date    Glaucoma is an eye disease caused by high pressure in the eye. An eye exam is recommended every year. Up to date    Cardiovascular screening tests that check your cholesterol and other blood fat (lipid) levels are recommended every five years. Up to date    Colorectal Cancer screening tests help to find pre-cancerous polyps (growths in the colon) so they can be removed before they turn into cancer. Tests ordered for screening depend on your personal and family history risk factors.     Screening for Breast Cancer is recommended yearly with a mammogram.N/A    Screening for Cervical Cancer is recommended every two years (annually for certain risk factors, such as previous history of STD or abnormal PAP in past 7 years), with a Pelvic Exam with PAP.N/A    Here is a list of your current Health Maintenance items with a due date:  Health Maintenance   Topic Date Due    DTaP/Tdap/Td series (1 - Tdap) 10/21/1951    GLAUCOMA SCREENING Q2Y  04/09/2017    MEDICARE YEARLY EXAM  06/21/2018    Influenza Age 9 to Adult  08/01/2018    Bone Densitometry (Dexa) Screening  Completed    ZOSTER VACCINE AGE 60>  Addressed    Pneumococcal 65+ Low/Medium Risk  Completed

## 2018-06-12 NOTE — MR AVS SNAPSHOT
303 [facility] 11. A [facility] [id number] [phone] Patient: Ruthy Pennington MRN:  [id number] Visit Information Date & Time Provider Department Dept. Phone Encounter #  
 6/12/2018  1:15 PM Dereck Homans, MD [facility] Internal Medicine [facility] [phone] [id number] Your Appointments 9/11/2018  1:15 PM  
Any with Dereck Homans, MD  
[facility] ([facility]) Appt Note: [facility]m [facility]. [facility] [phone]  
  
   
 [facility] [address] [address] Upcoming Health Maintenance Date Due DTaP/Tdap/Td series (1 - Tdap) 10/21/1951 GLAUCOMA SCREENING Q2Y 4/9/2017 MEDICARE YEARLY EXAM 6/21/2018 Influenza Age 5 to Adult 8/1/2018 Allergies as of 6/12/2018  Review Complete On: 6/12/2018 By: Dereck Homans, MD  
  
 Severity Noted Reaction Type Reactions Keflex [Cephalexin]  06/16/2011   Topical Rash Current Immunizations  Reviewed on 11/28/2017 Name Date Influenza High Dose Vaccine PF 11/14/2017 Influenza Vaccine Split 9/16/2010 Pneumococcal Conjugate (PCV-13) 8/1/2017 Pneumococcal Polysaccharide (PPSV-23) 3/25/2014 ZZZ-RETIRED (DO NOT USE) Pneumococcal Vaccine (Unspecified Type) 6/16/2006 Zoster Vaccine, Live 7/21/2016, 7/9/2012 Not reviewed this visit You Were Diagnosed With   
  
 Codes Comments Chronic obstructive pulmonary disease, unspecified COPD type ([facility]    -  Primary ICD-10-CM: J44.9 ICD-9-CM: 741 Gait abnormality     ICD-10-CM: R26.9 ICD-9-CM: 785. 2 Late onset Alzheimer's disease without behavioral disturbance     ICD-10-CM: G30.1, F02.80 ICD-9-CM: 331.0, 294.10 Hereditary chorea (HCC)     ICD-10-CM: G10 
ICD-9-CM: 333.4  Chronic respiratory failure with hypoxia (HCC)     ICD-10-CM: J96.11 
ICD-9-CM: 518.83, 799.02   
 Medicare annual wellness visit, subsequent     ICD-10-CM: Z00.00 ICD-9-CM: V70.0 Vitals BP Pulse Temp Resp Height(growth percentile) Weight(growth percentile) 126/72 (BP 1 Location: Left arm, BP Patient Position: Sitting) 91 98 °F (36.7 °C) (Oral) 20 5' 2\" (1.575 m) 159 lb (72.1 kg) SpO2 BMI OB Status Smoking Status 95% 29.08 kg/m2 Postmenopausal Former Smoker Vitals History BMI and BSA Data Body Mass Index Body Surface Area 29.08 kg/m 2 1.78 m 2 Preferred Pharmacy Pharmacy Name Phone [id number], [address] Kemi Breaux [phone] Your Updated Medication List  
  
   
This list is accurate as of 6/12/18  2:10 PM.  Always use your most recent med list.  
  
  
  
  
 acetaminophen 500 mg tablet Commonly known as:  TYLENOL Take 1 Tab by mouth two (2) times daily as needed for Pain. aspirin delayed-release 81 mg tablet Commonly known as:  ECOTRIN LOW STRENGTH Take 1 Tab by mouth daily. azelastine 0.05 % ophthalmic solution Commonly known as:  OPTIVAR Administer 1 Drop to right eye two (2) times a day. Use in affected eye(s)  
  
 calcium citrate-vitamin d3 315-200 mg-unit Tab Commonly known as:  CITRACAL+D Take 1 Tab by mouth daily (with breakfast). citalopram 20 mg tablet Commonly known as:  Donice Apt Take 1 Tab by mouth daily. D/C celexa 10 mg  
  
 clonazePAM 0.5 mg tablet Commonly known as:  KlonoPIN  
TAKE 1/2 TABLET IN THE MORNING AND TAKE 1 TABLET IN THE EVENING. fluticasone-vilanterol 100-25 mcg/dose inhaler Commonly known as:  BREO ELLIPTA Take 1 Puff by inhalation daily. meloxicam 7.5 mg tablet Commonly known as:  MOBIC Take 1 Tab by mouth daily. memantine 10 mg tablet Commonly known as:  Pollyann Balsam Take 1 Tab by mouth two (2) times a day. PATADAY 0.2 % Drop ophthalmic solution Generic drug:  olopatadine Patient Instructions Schedule of Personalized Health Plan (Provide Copy to Patient) The best way to stay healthy is to live a healthy lifestyle. A healthy lifestyle includes regular exercise, eating a well-balanced diet, keeping a healthy weight and not smoking. Regular physical exams and screening tests are another important way to take care of yourself. Preventive exams provided by health care providers can find health problems early when treatment works best and can keep you from getting certain diseases or illnesses. Preventive services include exams, lab tests, screenings, shots, monitoring and information to help you take care of your own health. All people over 65 should have a pneumonia shot. Pneumonia shots are usually only needed once in a lifetime unless your doctor decides differently. All people over 65 should have a yearly flu shot. People over 65 are at medium to high risk for Hepatitis B. Three shots are needed for complete protection. In addition to your physical exam, some screening tests are recommended: 
 
Bone mass measurement (dexa scan) is recommended every two years. up to date Diabetes Mellitus screening is recommended every year. up to date Glaucoma is an eye disease caused by high pressure in the eye. An eye exam is recommended every year. Up to date Cardiovascular screening tests that check your cholesterol and other blood fat (lipid) levels are recommended every five years. Up to date Colorectal Cancer screening tests help to find pre-cancerous polyps (growths in the colon) so they can be removed before they turn into cancer. Tests ordered for screening depend on your personal and family history risk factors. Screening for Breast Cancer is recommended yearly with a mammogram.N/A Screening for Cervical Cancer is recommended every two years (annually for certain risk factors, such as previous history of STD or abnormal PAP in past 7 years), with a Pelvic Exam with PAP. N/A 
 
 Here is a list of your current Health Maintenance items with a due date: 
Health Maintenance Topic Date Due  
 DTaP/Tdap/Td series (1 - Tdap) 10/21/1951  GLAUCOMA SCREENING Q2Y  04/09/2017  MEDICARE YEARLY EXAM  06/21/2018  Influenza Age 5 to Adult  08/01/2018  Bone Densitometry (Dexa) Screening  Completed  ZOSTER VACCINE AGE 60>  Addressed  Pneumococcal 65+ Low/Medium Risk  Completed Introducing [facility] [place] [facility]! [employer] introduces [employer] patient portal. Now you can access parts of your medical record, email your doctor's office, and request medication refills online. 1. In your internet browser, go to [email or web address] 2. Click on the First Time User? Click Here link in the Sign In box. You will see the New Member Sign Up page. 3. Enter your [employer] Access Code exactly as it appears below. You will not need to use this code after youve completed the sign-up process. If you do not sign up before the expiration date, you must request a new code. · [employer] Access Code: [email or web address] Expires: 9/10/2018  2:09 PM 
 
4. Enter the last four digits of your Social Security Number (xxxx) and Date of Birth (mm/dd/yyyy) as indicated and click Submit. You will be taken to the next sign-up page. 5. Create a [employer]and ID. This will be your [employer] login ID and cannot be changed, so think of one that is secure and easy to remember. 6. Create a [employer] password. You can change your password at any time. 7. Enter your Password Reset Question and Answer. This can be used at a later time if you forget your password. 8. Enter your e-mail address. You will receive e-mail notification when new information is available in [address] 9. Click Sign Up. You can now view and download portions of your medical record. 10. Click the Download Summary menu link to download a portable copy of your medical information. If you have questions, please visit the Frequently Asked Questions section of the [employer] website. Remember, [employer] is NOT to be used for urgent needs. For medical emergencies, dial 911. Now available from your iPhone and Android! Please provide this summary of care documentation to your next provider. Your primary care clinician is listed as Doris Pradhan. If you have any questions after today's visit, please call [phone].

## 2018-06-12 NOTE — PROGRESS NOTES
Daysi Tyler is a 80 y.o. female and presents for annual Medicare Wellness Visit. Problem List: Reviewed with patient and discussed risk factors. Patient Active Problem List   Diagnosis Code    Cerebral thrombosis with cerebral infarction ([facility] I63.30    COPD (chronic obstructive pulmonary disease) ([facility]) J44.9    Chronic respiratory failure with hypoxia ([facility]) J96.11    Sinus tachycardia R00.0    S/P knee replacement Z96.659    Obstructive sleep apnea G47.33    Dyslipidemia E78.5    Debility R53.81    Nausea and vomiting R11.2    UTI (urinary tract infection) N39.0    Hereditary chorea ([facility] G10    Late onset Alzheimer's disease without behavioral disturbance G30.1, F02.80       Current medical providers:  Patient Care Team:  Nuvia Lema MD as PCP - General (Internal Medicine)  Judy Perez LPN as Ambulatory Care Navigator (Internal Medicine)    PSH: Reviewed with patient  Past Surgical History:   Procedure Laterality Date    ABDOMEN SURGERY [address] UNLISTED      hernia repair    HX GYN  1963    c section    HX HEENT      status post T&A    HX OOPHORECTOMY      HX ORTHOPAEDIC      status post left total knee replacement        SH: Reviewed with patient  Social History   Substance Use Topics    Smoking status: Former Smoker     Packs/day: 1.00     Years: 40.00     Quit date: 6/16/1982    Smokeless tobacco: Never Used    Alcohol use No       FH: Reviewed with patient  Family History   Problem Relation Age of Onset    Hypertension Maternal Grandmother     Hypertension Maternal Grandfather     Hypertension Paternal Grandmother     Hypertension Paternal Grandfather     MS Daughter     No Known Problems Mother     No Known Problems Father        Medications/Allergies: Reviewed with patient  Current Outpatient Prescriptions on File Prior to Visit   Medication Sig Dispense Refill    memantine (NAMENDA) 10 mg tablet Take 1 Tab by mouth two (2) times a day.  180 Tab 1    clonazePAM (KLONOPIN) 0.5 mg tablet TAKE 1/2 TABLET IN THE MORNING AND TAKE 1 TABLET IN THE EVENING. 45 Tab 2    fluticasone-vilanterol (BREO ELLIPTA) 100-25 mcg/dose inhaler Take 1 Puff by inhalation daily. 1 Inhaler 5    acetaminophen (TYLENOL) 500 mg tablet Take 1 Tab by mouth two (2) times daily as needed for Pain. 60 Tab 3    meloxicam (MOBIC) 7.5 mg tablet Take 1 Tab by mouth daily. 30 Tab 5    azelastine (OPTIVAR) 0.05 % ophthalmic solution Administer 1 Drop to right eye two (2) times a day. Use in affected eye(s) 6 mL 1    PATADAY 0.2 % drop ophthalmic solution   4    citalopram (CELEXA) 20 mg tablet Take 1 Tab by mouth daily. D/C celexa 10 mg 30 Tab 6    aspirin delayed-release (ECOTRIN LOW STRENGTH) 81 mg tablet Take 1 Tab by mouth daily. 30 Tab 12    calcium citrate-vitamin d3 (CITRACAL+D) 315-200 mg-unit tab Take 1 Tab by mouth daily (with breakfast). 30 Tab 12     No current facility-administered medications on file prior to visit. Allergies   Allergen Reactions    Keflex [Cephalexin] Rash       Objective:  Visit Vitals    /72 (BP 1 Location: Left arm, BP Patient Position: Sitting)    Pulse 91    Temp 98 °F (36.7 °C) (Oral)    Resp 20    Ht 5' 2\" (1.575 m)    Wt 159 lb (72.1 kg)    SpO2 95%    BMI 29.08 kg/m2    Body mass index is 29.08 kg/(m^2). Assessment of cognitive impairment: Alert and oriented x 3    Depression Screen:   PHQ over the last two weeks 6/12/2018   Little interest or pleasure in doing things Not at all   Feeling down, depressed or hopeless Not at all   Total Score PHQ 2 0       Fall Risk Assessment:    Fall Risk Assessment, last 12 mths 6/12/2018   Able to walk? Yes   Fall in past 12 months? No   Fall with injury? -   Number of falls in past 12 months -   Fall Risk Score -       Functional Ability:   Does the patient exhibit a steady gait?  no   How long did it take the patient to get up and walk from a sitting position?  30 sec   Is the patient self reliant?  (ie can do own laundry, meals, household chores)  no     Does the patient handle his/her own medications?  no     Does the patient handle his/her own money? no     Is the patients home safe (ie good lighting, handrails on stairs and bath, etc.)? yes     Did you notice or did patient express any hearing difficulties? no     Did you notice or did patient express any vision difficulties?   no     Were distance and reading eye charts used? no       Advance Care Planning:   Patient was offered the opportunity to discuss advance care planning:  yes     Does patient have an Advance Directive:  yes   If no, did you provide information on Caring Connections? yes       Plan:      No orders of the defined types were placed in this encounter. Health Maintenance   Topic Date Due    DTaP/Tdap/Td series (1 - Tdap) 10/21/1951    GLAUCOMA SCREENING Q2Y  04/09/2017    MEDICARE YEARLY EXAM  06/21/2018    Influenza Age 9 to Adult  08/01/2018    Bone Densitometry (Dexa) Screening  Completed    ZOSTER VACCINE AGE 60>  Addressed    Pneumococcal 65+ Low/Medium Risk  Completed       *Patient verbalized understanding and agreement with the plan. A copy of the After Visit Summary with personalized health plan was given to the patient today.

## 2018-06-12 NOTE — PROGRESS NOTES
Health Maintenance Due   Topic Date Due    DTaP/Tdap/Td series (1 - Tdap) 10/21/1951    GLAUCOMA SCREENING Q2Y  04/09/2017       Chief Complaint   Patient presents with    Annual Wellness Visit    Gait Problem     states is getting worse       1. Have you been to the ER, urgent care clinic since your last visit? Hospitalized since your last visit? No    2. Have you seen or consulted any other health care providers outside of the [address] [place] [address] since your last visit? Include any pap smears or colon screening. No    3) Do you have an Advance Directive on file? no    4) Are you interested in receiving information on Advance Directives? NO      Patient is accompanied by self I have received verbal consent from Mikael Kellogg to discuss any/all medical information while they are present in the room.

## 2018-06-12 NOTE — PROGRESS NOTES
HISTORY OF PRESENT ILLNESS  Cleopatra Santacruz is a 80 y.o. female. Cleopatra Santacruz is a 80 y.o. female here for follow-up. Report nasal congestion and postnasal drip for last 7 days. The cough is getting worse. No wheezing or shortness of breath. As per her daughter her gait and balance is getting worse. She is falling frequently. 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 Has dementia. stable. able to tolerate namenda well. Labs are reviewed. stable. Gait Problem         Review of Systems   Constitutional: Negative. HENT: Negative. Eyes: Negative. Respiratory: Negative. Cardiovascular: Negative. Gastrointestinal: Negative. Genitourinary: Negative. Musculoskeletal: Positive for gait problem. Skin: Negative. Neurological: Positive for dizziness. Psychiatric/Behavioral: Positive for memory loss. The patient is nervous/anxious. Physical Exam   Constitutional: She appears well-developed and well-nourished. No distress. Neck: Normal range of motion. Neck supple. No tracheal deviation present. No thyromegaly present. Cardiovascular: Normal rate, regular rhythm, normal heart sounds and intact distal pulses. Pulmonary/Chest: Effort normal and breath sounds normal. No respiratory distress. She has no wheezes. Musculoskeletal: She exhibits no edema or tenderness. Psychiatric: She has a normal mood and affect. Her behavior is normal.     Follow-up       Dementia      Anxiety         Review of Systems   HENT: Negative. Eyes: Negative. Respiratory: Negative. Cardiovascular: Negative. Gastrointestinal: Negative. Musculoskeletal: Positive for joint pain. Negative for falls. Skin: Negative. Neurological: Negative. Negative for loss of consciousness. Psychiatric/Behavioral: Positive for memory loss. The patient is nervous/anxious. Physical Exam   Constitutional: She appears well-developed and well-nourished. No distress.    Neck: Normal range of motion. Neck supple. No JVD present. No thyromegaly present. Cardiovascular: Normal rate, regular rhythm, normal heart sounds and intact distal pulses. Pulmonary/Chest: Effort normal and breath sounds normal. No respiratory distress. She has no wheezes. She has no rales. Musculoskeletal: She exhibits tenderness. Neurological: Coordination abnormal.   Abnormal movement or arms and legs. Psychiatric: Her behavior is normal. Judgment and thought content normal.         ASSESSMENT and PLAN    Diagnoses and all orders for this visit:    1. Chronic obstructive pulmonary disease, unspecified COPD type ([address]    On inhaler. Stable. 2. Gait abnormality  Has Doren Sea, not able to walk comfortably for long distance. [place] have a power wheelchair. 3. Late onset Alzheimer's disease without behavioral disturbance  Stable on current regimen. 4. Hereditary chorea ([address]  Better controlled with anxiety medications. 5. Chronic respiratory failure with hypoxia (HCC)  Stable. 6. Medicare annual wellness visit, subsequent          Discussed expected course/resolution/complications of diagnosis in detail with patient. Medication risks/benefits/costs/interactions/alternatives discussed with patient. Pt was given an after visit summary which includes diagnoses, current medications & vitals. Pt expressed understanding with the diagnosis and plan.

## 2018-07-06 DIAGNOSIS — G30.1 LATE ONSET ALZHEIMER'S DISEASE WITHOUT BEHAVIORAL DISTURBANCE (HCC): ICD-10-CM

## 2018-07-06 DIAGNOSIS — F02.80 LATE ONSET ALZHEIMER'S DISEASE WITHOUT BEHAVIORAL DISTURBANCE (HCC): ICD-10-CM

## 2018-07-09 RX ORDER — MEMANTINE HYDROCHLORIDE 10 MG/1
TABLET ORAL
Qty: 60 TAB | Refills: 0 | Status: SHIPPED | OUTPATIENT
Start: 2018-07-09 | End: 2018-10-09 | Stop reason: SDUPTHER

## 2018-07-17 DIAGNOSIS — G10 HEREDITARY CHOREA (HCC): ICD-10-CM

## 2018-07-17 RX ORDER — CLONAZEPAM 0.5 MG/1
TABLET ORAL
Qty: 45 TAB | Refills: 2 | Status: SHIPPED | OUTPATIENT
Start: 2018-07-17 | End: 2018-10-18 | Stop reason: SDUPTHER

## 2018-09-11 ENCOUNTER — OFFICE VISIT (OUTPATIENT)
Dept: INTERNAL MEDICINE CLINIC | Age: 83
End: 2018-09-11

## 2018-09-11 VITALS
SYSTOLIC BLOOD PRESSURE: 145 MMHG | HEIGHT: 62 IN | OXYGEN SATURATION: 98 % | RESPIRATION RATE: 15 BRPM | DIASTOLIC BLOOD PRESSURE: 87 MMHG | HEART RATE: 77 BPM | WEIGHT: 159 LBS | BODY MASS INDEX: 29.26 KG/M2

## 2018-09-11 DIAGNOSIS — G10 HEREDITARY CHOREA (HCC): ICD-10-CM

## 2018-09-11 DIAGNOSIS — E78.5 DYSLIPIDEMIA: ICD-10-CM

## 2018-09-11 DIAGNOSIS — J44.9 CHRONIC OBSTRUCTIVE PULMONARY DISEASE, UNSPECIFIED COPD TYPE (HCC): ICD-10-CM

## 2018-09-11 DIAGNOSIS — I63.30 CEREBRAL THROMBOSIS WITH CEREBRAL INFARCTION (HCC): Primary | ICD-10-CM

## 2018-09-11 DIAGNOSIS — H10.13 ALLERGIC CONJUNCTIVITIS OF BOTH EYES: ICD-10-CM

## 2018-09-11 DIAGNOSIS — L50.9 URTICARIA: ICD-10-CM

## 2018-09-11 RX ORDER — AZELASTINE HYDROCHLORIDE 0.5 MG/ML
1 SOLUTION/ DROPS OPHTHALMIC
Qty: 6 ML | Refills: 1 | Status: SHIPPED | OUTPATIENT
Start: 2018-09-11 | End: 2019-05-28 | Stop reason: SDUPTHER

## 2018-09-11 NOTE — PROGRESS NOTES
Health Maintenance Due Topic Date Due  
 DTaP/Tdap/Td series (1 - Tdap) 10/21/1951  GLAUCOMA SCREENING Q2Y  04/09/2017 Chief Complaint Patient presents with  Dementia  Cerebrovascular Accident  
  ataxic gait; here for face to face for PT order 1. Have you been to the ER, urgent care clinic since your last visit? Hospitalized since your last visit? No 
 
2. Have you seen or consulted any other health care providers outside of the [facility] since your last visit? Include any pap smears or colon screening. No 
 
3) Do you have an Advance Directive on file? yes 4) Are you interested in receiving information on Advance Directives? NO Patient is accompanied by  I have received verbal consent from Ce Crockett to discuss any/all medical information while they are present in the room.

## 2018-09-11 NOTE — PROGRESS NOTES
HISTORY OF PRESENT ILLNESS Samm Etienne is a 80 y.o. female. Samm Etienne is a 80 y.o. female here for follow-up. Report bilateral HPI. Would like to get eyedrops. Had stroke in the past.  Gait is okay for now. 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 Has COPD, using Breo Ellipta inhaler. No shortness of breath or wheezing. Has dementia. stable. able to tolerate namenda well. Labs are reviewed. stable. Dementia Itchy Eye   
Associated symptoms include eye redness. Gait Problem Review of Systems Constitutional: Negative. HENT: Negative. Eyes: Positive for redness and itching. Respiratory: Negative. Cardiovascular: Negative. Gastrointestinal: Negative. Genitourinary: Negative. Musculoskeletal: Positive for gait problem. Skin: Negative. Psychiatric/Behavioral: Positive for memory loss. The patient is nervous/anxious. Physical Exam  
Constitutional: She appears well-developed and well-nourished. No distress. HENT:  
Head: Normocephalic and atraumatic. Right Ear: External ear normal.  
Left Ear: External ear normal.  
Nose: Nose normal.  
Mouth/Throat: Oropharynx is clear and moist. No oropharyngeal exudate. Eyes: EOM are normal. Pupils are equal, round, and reactive to light. Right eye exhibits no discharge. Left eye exhibits no discharge. No scleral icterus. .  Inflamed conjunctiva. No yellow discharge. Neck: Normal range of motion. Neck supple. No tracheal deviation present. No thyromegaly present. Cardiovascular: Normal rate, regular rhythm, normal heart sounds and intact distal pulses. Pulmonary/Chest: Effort normal and breath sounds normal. No respiratory distress. She has no wheezes. Musculoskeletal: She exhibits no edema or tenderness. Psychiatric: She has a normal mood and affect. Her behavior is normal.  
 
Follow-up Dementia Anxiety Review of Systems HENT: Negative. Eyes: Negative. Respiratory: Negative. Cardiovascular: Negative. Gastrointestinal: Negative. Musculoskeletal: Positive for joint pain. Negative for falls. Skin: Negative. Neurological: Negative. Negative for loss of consciousness. Psychiatric/Behavioral: Positive for memory loss. The patient is nervous/anxious. Physical Exam  
Constitutional: She appears well-developed and well-nourished. No distress. Neck: Normal range of motion. Neck supple. No JVD present. No thyromegaly present. Cardiovascular: Normal rate, regular rhythm, normal heart sounds and intact distal pulses. Pulmonary/Chest: Effort normal and breath sounds normal. No respiratory distress. She has no wheezes. She has no rales. Musculoskeletal: She exhibits tenderness. Neurological: Coordination abnormal.  
Abnormal movement or arms and legs. Psychiatric: Her behavior is normal. Judgment and thought content normal.  
 
 
 
ASSESSMENT and PLAN Diagnoses and all orders for this visit: 1. Cerebral thrombosis with cerebral infarction ([facility] Taking baby aspirin. Doing well. 2. Dyslipidemia Low-cholesterol diet. Not on statin. 3. Chronic obstructive pulmonary disease, unspecified COPD type ([facility] Using [facility], no shortness of breath. 4. Hereditary chorea ([facility] Using Klonopin, controlled. 5. Allergic conjunctivitis of both eyes Will call in, 
-     azelastine (OPTIVAR) 0.05 % ophthalmic solution; Administer 1 Drop to right eye two (2) times daily as needed. Use in affected eye(s) Discussed expected course/resolution/complications of diagnosis in detail with patient. Medication risks/benefits/costs/interactions/alternatives discussed with patient. Pt was given an after visit summary which includes diagnoses, current medications & vitals. Pt expressed understanding with the diagnosis and plan.

## 2018-09-11 NOTE — MR AVS SNAPSHOT
[address] [place] [address]. [facility] 7 [id number] 
[phone] Patient: Ghazala Rider MRN:  [id number] Visit Information Date & Time Provider Department Dept. Phone Encounter #  
 9/11/2018 10:45 AM MD Yazmin Oliveira [phone] [id number] Your Appointments 10/9/2018  1:30 PM  
Any with MD Yazmin Oliveira ([facility]) Appt Note: follow up 4m; follow up 4m [address] [place] [address]. [facility] 7 [phone]  
[phone]  
  
   
 [address] [place] [address]. [address] [place] [id number] Upcoming Health Maintenance Date Due DTaP/Tdap/Td series (1 - Tdap) 10/21/1951 GLAUCOMA SCREENING Q2Y 4/9/2017 Influenza Age 5 to Adult 10/31/2018* MEDICARE YEARLY EXAM 6/13/2019 *Topic was postponed. The date shown is not the original due date. Allergies as of 9/11/2018  Review Complete On: 9/11/2018 By: Geovanny Funes MD  
  
 Severity Noted Reaction Type Reactions Keflex [Cephalexin]  06/16/2011   Topical Rash Current Immunizations  Reviewed on 9/11/2018 Name Date Influenza High Dose Vaccine PF 11/14/2017 Influenza Vaccine Split 9/16/2010 Pneumococcal Conjugate (PCV-13) 8/1/2017 Pneumococcal Polysaccharide (PPSV-23) 3/25/2014 ZZZ-RETIRED (DO NOT USE) Pneumococcal Vaccine (Unspecified Type) 6/16/2006 Zoster Vaccine, Live 7/21/2016, 7/9/2012 Reviewed by Geovanny Funes MD on 9/11/2018 at 11:26 AM  
You Were Diagnosed With   
  
 Codes Comments Cerebral thrombosis with cerebral infarction [facility])    -  Primary ICD-10-CM: I63.30 ICD-9-CM: 434.01 Dyslipidemia     ICD-10-CM: E78.5 ICD-9-CM: 272.4 Chronic obstructive pulmonary disease, unspecified COPD type ([facility]     ICD-10-CM: J44.9 ICD-9-CM: 125 Hereditary chorea (HCC)     ICD-10-CM: G10 
ICD-9-CM: 333.4 Allergic conjunctivitis of both eyes     ICD-10-CM: H10.13 ICD-9-CM: 372.14 Urticaria     ICD-10-CM: L50.9 ICD-9-CM: 708. 9 Vitals BP Pulse Resp Height(growth percentile) Weight(growth percentile) SpO2  
 145/87 (BP 1 Location: Right arm, BP Patient Position: Sitting) 77 15 5' 2\" (1.575 m) 159 lb (72.1 kg) 98% BMI OB Status Smoking Status 29.08 kg/m2 Postmenopausal Former Smoker Vitals History BMI and BSA Data Body Mass Index Body Surface Area 29.08 kg/m 2 1.78 m 2 Preferred Pharmacy Pharmacy Name Phone [id number] [place], [address] [place] [phone] Your Updated Medication List  
  
   
This list is accurate as of 9/11/18 11:26 AM.  Always use your most recent med list.  
  
  
  
  
 acetaminophen 500 mg tablet Commonly known as:  TYLENOL Take 1 Tab by mouth two (2) times daily as needed for Pain. aspirin delayed-release 81 mg tablet Commonly known as:  ECOTRIN LOW STRENGTH Take 1 Tab by mouth daily. azelastine 0.05 % ophthalmic solution Commonly known as:  OPTIVAR Administer 1 Drop to right eye two (2) times daily as needed. Use in affected eye(s)  
  
 calcium citrate-vitamin d3 315-200 mg-unit Tab Commonly known as:  CITRACAL+D Take 1 Tab by mouth daily (with breakfast). clonazePAM 0.5 mg tablet Commonly known as:  KlonoPIN  
TAKE 1/2 TAB BY MOUTH IN THE AM AND 1 TAB IN THE EVENING  
  
 fluticasone-vilanterol 100-25 mcg/dose inhaler Commonly known as:  BREO ELLIPTA Take 1 Puff by inhalation daily. memantine 10 mg tablet Commonly known as:  Jerolyn Harshil TAKE 1 TABLET BY MOUTH TWICE A DAY Prescriptions Sent to Pharmacy Refills  
 azelastine (OPTIVAR) 0.05 % ophthalmic solution 1 Sig: Administer 1 Drop to right eye two (2) times daily as needed. Use in affected eye(s)  Class: Normal  
 Pharmacy: [facility] [address] [place] [address], [address] [place] [address] #: [phone] Route: Right Eye Introducing [facility]! Shireen Willett introduces [employer] patient portal. Now you can access parts of your medical record, email your doctor's office, and request medication refills online. 1. In your internet browser, go to [email or web address] 2. Click on the First Time User? Click Here link in the Sign In box. You will see the New Member Sign Up page. 3. Enter your [employer] Access Code exactly as it appears below. You will not need to use this code after youve completed the sign-up process. If you do not sign up before the expiration date, you must request a new code. · [employer] Access Code: [email or web address] Expires: 12/10/2018 11:26 AM 
 
4. Enter the last four digits of your Social Security Number (xxxx) and Date of Birth (mm/dd/yyyy) as indicated and click Submit. You will be taken to the next sign-up page. 5. Create a [facility] ID. This will be your [facility] login ID and cannot be changed, so think of one that is secure and easy to remember. 6. Create a [employer] password. You can change your password at any time. 7. Enter your Password Reset Question and Answer. This can be used at a later time if you forget your password. 8. Enter your e-mail address. You will receive e-mail notification when new information is available in [address] 9. Click Sign Up. You can now view and download portions of your medical record. 10. Click the Download Summary menu link to download a portable copy of your medical information. If you have questions, please visit the Frequently Asked Questions section of the [employer] website. Remember, [employer] is NOT to be used for urgent needs. For medical emergencies, dial 911. Now available from your iPhone and Android! Please provide this summary of care documentation to your next provider. Your primary care clinician is listed as Karen Cota. If you have any questions after today's visit, please call [phone].

## 2018-10-08 ENCOUNTER — TELEPHONE (OUTPATIENT)
Dept: INTERNAL MEDICINE CLINIC | Age: 83
End: 2018-10-08

## 2018-10-09 DIAGNOSIS — G30.1 LATE ONSET ALZHEIMER'S DISEASE WITHOUT BEHAVIORAL DISTURBANCE (HCC): ICD-10-CM

## 2018-10-09 DIAGNOSIS — F02.80 LATE ONSET ALZHEIMER'S DISEASE WITHOUT BEHAVIORAL DISTURBANCE (HCC): ICD-10-CM

## 2018-10-09 RX ORDER — MEMANTINE HYDROCHLORIDE 10 MG/1
10 TABLET ORAL 2 TIMES DAILY
Qty: 60 TAB | Refills: 5 | Status: SHIPPED | OUTPATIENT
Start: 2018-10-09 | End: 2018-11-28 | Stop reason: SDUPTHER

## 2018-10-18 DIAGNOSIS — G10 HEREDITARY CHOREA (HCC): ICD-10-CM

## 2018-10-18 RX ORDER — CLONAZEPAM 0.5 MG/1
TABLET ORAL
Qty: 45 TAB | Refills: 2 | Status: SHIPPED | OUTPATIENT
Start: 2018-10-18 | End: 2019-02-05 | Stop reason: SDUPTHER

## 2018-11-09 ENCOUNTER — OFFICE VISIT (OUTPATIENT)
Dept: INTERNAL MEDICINE CLINIC | Age: 83
End: 2018-11-09

## 2018-11-09 VITALS
DIASTOLIC BLOOD PRESSURE: 67 MMHG | OXYGEN SATURATION: 92 % | HEART RATE: 85 BPM | RESPIRATION RATE: 18 BRPM | BODY MASS INDEX: 29.26 KG/M2 | TEMPERATURE: 97.5 F | WEIGHT: 159 LBS | SYSTOLIC BLOOD PRESSURE: 137 MMHG | HEIGHT: 62 IN

## 2018-11-09 DIAGNOSIS — R53.83 FATIGUE, UNSPECIFIED TYPE: Primary | ICD-10-CM

## 2018-11-09 DIAGNOSIS — R53.1 WEAKNESS GENERALIZED: ICD-10-CM

## 2018-11-09 DIAGNOSIS — R05.9 COUGH: ICD-10-CM

## 2018-11-09 RX ORDER — GUAIFENESIN 600 MG/1
600 TABLET, EXTENDED RELEASE ORAL 2 TIMES DAILY
Qty: 14 TAB | Refills: 0 | Status: SHIPPED | OUTPATIENT
Start: 2018-11-09 | End: 2019-04-09 | Stop reason: ALTCHOICE

## 2018-11-09 NOTE — PROGRESS NOTES
Health Maintenance Due Topic Date Due  
 DTaP/Tdap/Td series (1 - Tdap) 10/21/1951  Shingrix Vaccine Age 50> (1 of 2) 10/21/1980  GLAUCOMA SCREENING Q2Y  04/09/2017  Influenza Age 5 to Adult  08/01/2018 Chief Complaint Patient presents with  
Rosanne Jyoti Decreased 89%-90%  Cough 1. Have you been to the ER, urgent care clinic since your last visit? Hospitalized since your last visit? No 
 
2. Have you seen or consulted any other health care providers outside of the [address] [place] [address] since your last visit? Include any pap smears or colon screening. No 
 
3) Do you have an Advance Directive on file? no 
 
4) Are you interested in receiving information on Advance Directives? NO Patient is accompanied by self I have received verbal consent from Sari Wick to discuss any/all medical information while they are present in the room.

## 2018-11-09 NOTE — PROGRESS NOTES
HISTORY OF PRESENT ILLNESS Sachin Cope is a 80 y.o. female. This is a patient of Dr. Huseyin Reddy who presents today per request of OT. While working with occupational therapy today, patient had complaints of weakness. Her oxygen saturation was checked at time and noted by OT to be 89% on room air. Patient states in the past she has worn O2 at night, but has not required this recently (for several months). Patient denies feeling short of breath at time of visit. She states she has had mild cough for about 1 week. No nasal congestion, fever, or chills. No chest pain. Patient states she is feeling a bit fatigued and weak. She was hoping therapy services would help with this, but she does not feel symptoms are improving as of yet. Visit Vitals /67 (BP 1 Location: Right arm, BP Patient Position: Sitting) Pulse 85 Temp 97.5 °F (36.4 °C) (Oral) Resp 18 Ht 5' 2\" (1.575 m) Wt 159 lb (72.1 kg) SpO2 92% BMI 29.08 kg/m² HPI Review of Systems Constitutional: Positive for malaise/fatigue. Negative for chills and fever. HENT: Negative for congestion. Respiratory: Positive for cough. Negative for hemoptysis, sputum production, shortness of breath and wheezing. Cardiovascular: Negative for chest pain and leg swelling. Gastrointestinal: Negative for abdominal pain. Genitourinary: Negative. Musculoskeletal: Negative. Skin: Negative. Neurological: Positive for weakness. Negative for dizziness and headaches. Endo/Heme/Allergies: Negative. Psychiatric/Behavioral: Negative. Physical Exam  
Constitutional: She is oriented to person, place, and time. She appears well-developed. No distress. In power scooter HENT:  
Head: Normocephalic and atraumatic. Cardiovascular: Normal rate and regular rhythm. Pulmonary/Chest: Effort normal and breath sounds normal. No respiratory distress. She has no wheezes. She has no rales. Non-productive cough Abdominal: Soft. Bowel sounds are normal. She exhibits no distension. There is no tenderness. Musculoskeletal: She exhibits no edema. Neurological: She is alert and oriented to person, place, and time. Skin: Skin is warm and dry. Psychiatric: She has a normal mood and affect. Her behavior is normal.  
Nursing note and vitals reviewed. ASSESSMENT and PLAN Encounter Diagnoses Name Primary?  Fatigue, unspecified type Yes  Weakness generalized  Cough Will order Orders Placed This Encounter  CBC WITH AUTOMATED DIFF  
 METABOLIC PANEL, COMPREHENSIVE  
 TSH 3RD GENERATION  
 guaiFENesin ER (MUCINEX) 600 mg ER tablet Sig: Take 1 Tab by mouth two (2) times a day. Dispense:  14 Tab Refill:  0 Continue therapy services, as tolerated. Lab results and schedule of future lab studies reviewed with patient Reviewed diet, exercise and weight control Reviewed medications and side effects in detail If experiencing severe shortness of breath or chest pain, present to the ER. Patient encouraged to call or return to office if symptoms do not improve or worsen. Reviewed plan of care with patient who acknowledges understanding and agrees.

## 2018-11-10 LAB
ALBUMIN SERPL-MCNC: 3.9 G/DL (ref 3.5–4.7)
ALBUMIN/GLOB SERPL: 1.3 {RATIO} (ref 1.2–2.2)
ALP SERPL-CCNC: 81 IU/L (ref 39–117)
ALT SERPL-CCNC: 14 IU/L (ref 0–32)
AST SERPL-CCNC: 24 IU/L (ref 0–40)
BASOPHILS # BLD AUTO: 0 X10E3/UL (ref 0–0.2)
BASOPHILS NFR BLD AUTO: 0 %
BILIRUB SERPL-MCNC: 0.8 MG/DL (ref 0–1.2)
BUN SERPL-MCNC: 15 MG/DL (ref 8–27)
BUN/CREAT SERPL: 16 (ref 12–28)
CALCIUM SERPL-MCNC: 9 MG/DL (ref 8.7–10.3)
CHLORIDE SERPL-SCNC: 103 MMOL/L (ref 96–106)
CO2 SERPL-SCNC: 28 MMOL/L (ref 20–29)
CREAT SERPL-MCNC: 0.96 MG/DL (ref 0.57–1)
EOSINOPHIL # BLD AUTO: 0.1 X10E3/UL (ref 0–0.4)
EOSINOPHIL NFR BLD AUTO: 1 %
ERYTHROCYTE [DISTWIDTH] IN BLOOD BY AUTOMATED COUNT: 15.1 % (ref 12.3–15.4)
GLOBULIN SER CALC-MCNC: 2.9 G/DL (ref 1.5–4.5)
GLUCOSE SERPL-MCNC: 117 MG/DL (ref 65–99)
HCT VFR BLD AUTO: 40.7 % (ref 34–46.6)
HGB BLD-MCNC: 13.1 G/DL (ref 11.1–15.9)
IMM GRANULOCYTES # BLD: 0 X10E3/UL (ref 0–0.1)
IMM GRANULOCYTES NFR BLD: 0 %
INTERPRETATION: NORMAL
LYMPHOCYTES # BLD AUTO: 1.2 X10E3/UL (ref 0.7–3.1)
LYMPHOCYTES NFR BLD AUTO: 16 %
MCH RBC QN AUTO: 28.7 PG (ref 26.6–33)
MCHC RBC AUTO-ENTMCNC: 32.2 G/DL (ref 31.5–35.7)
MCV RBC AUTO: 89 FL (ref 79–97)
MONOCYTES # BLD AUTO: 0.8 X10E3/UL (ref 0.1–0.9)
MONOCYTES NFR BLD AUTO: 11 %
NEUTROPHILS # BLD AUTO: 5.2 X10E3/UL (ref 1.4–7)
NEUTROPHILS NFR BLD AUTO: 72 %
PLATELET # BLD AUTO: 214 X10E3/UL (ref 150–379)
POTASSIUM SERPL-SCNC: 4.4 MMOL/L (ref 3.5–5.2)
PROT SERPL-MCNC: 6.8 G/DL (ref 6–8.5)
RBC # BLD AUTO: 4.56 X10E6/UL (ref 3.77–5.28)
SODIUM SERPL-SCNC: 142 MMOL/L (ref 134–144)
TSH SERPL DL<=0.005 MIU/L-ACNC: 1.9 UIU/ML (ref 0.45–4.5)
WBC # BLD AUTO: 7.3 X10E3/UL (ref 3.4–10.8)

## 2018-11-28 DIAGNOSIS — G30.1 LATE ONSET ALZHEIMER'S DISEASE WITHOUT BEHAVIORAL DISTURBANCE (HCC): ICD-10-CM

## 2018-11-28 DIAGNOSIS — F02.80 LATE ONSET ALZHEIMER'S DISEASE WITHOUT BEHAVIORAL DISTURBANCE (HCC): ICD-10-CM

## 2018-11-28 RX ORDER — MEMANTINE HYDROCHLORIDE 10 MG/1
10 TABLET ORAL 2 TIMES DAILY
Qty: 180 TAB | Refills: 1 | Status: SHIPPED | OUTPATIENT
Start: 2018-11-28 | End: 2018-11-29 | Stop reason: SDUPTHER

## 2018-11-29 DIAGNOSIS — F02.80 LATE ONSET ALZHEIMER'S DISEASE WITHOUT BEHAVIORAL DISTURBANCE (HCC): ICD-10-CM

## 2018-11-29 DIAGNOSIS — G30.1 LATE ONSET ALZHEIMER'S DISEASE WITHOUT BEHAVIORAL DISTURBANCE (HCC): ICD-10-CM

## 2018-11-29 RX ORDER — MEMANTINE HYDROCHLORIDE 10 MG/1
10 TABLET ORAL 2 TIMES DAILY
Qty: 180 TAB | Refills: 1 | Status: SHIPPED | OUTPATIENT
Start: 2018-11-29 | End: 2020-03-24

## 2018-12-04 ENCOUNTER — OFFICE VISIT (OUTPATIENT)
Dept: INTERNAL MEDICINE CLINIC | Age: 83
End: 2018-12-04

## 2018-12-04 VITALS
SYSTOLIC BLOOD PRESSURE: 138 MMHG | RESPIRATION RATE: 15 BRPM | HEIGHT: 62 IN | OXYGEN SATURATION: 98 % | WEIGHT: 163 LBS | HEART RATE: 94 BPM | DIASTOLIC BLOOD PRESSURE: 69 MMHG | BODY MASS INDEX: 30 KG/M2 | TEMPERATURE: 95.8 F

## 2018-12-04 DIAGNOSIS — R26.9 GAIT ABNORMALITY: ICD-10-CM

## 2018-12-04 DIAGNOSIS — J44.9 CHRONIC OBSTRUCTIVE PULMONARY DISEASE, UNSPECIFIED COPD TYPE (HCC): ICD-10-CM

## 2018-12-04 DIAGNOSIS — I63.30 CEREBRAL THROMBOSIS WITH CEREBRAL INFARCTION (HCC): Primary | ICD-10-CM

## 2018-12-04 NOTE — PROGRESS NOTES
HISTORY OF PRESENT ILLNESS Amanda Acosta is a 80 y.o. female. here for follow-up. Has COPD, she coughs sometimes. Using Mucinex as needed. No shortness of breath or wheezing. Had stroke in the past.  Gait is okay for now. But she thinks. She should get another bouts of physical therapy which will help her. 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 Has dementia. stable. able to tolerate namenda well. Labs are reviewed. stable. Dementia Gait Problem Cholesterol Problem Neurologic Problem Primary symptoms include memory loss. Review of Systems Constitutional: Negative. HENT: Negative. Eyes: Positive for itching. Respiratory: Negative. Cardiovascular: Negative. Gastrointestinal: Negative. Genitourinary: Negative. Musculoskeletal: Positive for gait problem. Skin: Negative. Psychiatric/Behavioral: Positive for memory loss. The patient is nervous/anxious. Physical Exam  
Constitutional: She appears well-developed and well-nourished. No distress. Eyes: EOM are normal. Right eye exhibits no discharge. Left eye exhibits no discharge. No scleral icterus. Neck: Normal range of motion. Neck supple. No tracheal deviation present. No thyromegaly present. Cardiovascular: Normal rate, regular rhythm, normal heart sounds and intact distal pulses. Pulmonary/Chest: Effort normal and breath sounds normal. No respiratory distress. She has no wheezes. Musculoskeletal: She exhibits no edema or tenderness. Psychiatric: She has a normal mood and affect. Her behavior is normal.  
 
Follow-up Dementia Anxiety Review of Systems HENT: Negative. Eyes: Negative. Respiratory: Negative. Cardiovascular: Negative. Gastrointestinal: Negative. Musculoskeletal: Positive for joint pain. Negative for falls. Skin: Negative. Neurological: Negative. Negative for loss of consciousness. Psychiatric/Behavioral: Positive for memory loss. The patient is nervous/anxious. Physical Exam  
Constitutional: She appears well-developed and well-nourished. No distress. Neck: Normal range of motion. Neck supple. No JVD present. No thyromegaly present. Cardiovascular: Normal rate, regular rhythm, normal heart sounds and intact distal pulses. Pulmonary/Chest: Effort normal and breath sounds normal. No respiratory distress. She has no wheezes. She has no rales. Musculoskeletal: She exhibits tenderness. Neurological: Coordination abnormal.  
Abnormal movement or arms and legs. Psychiatric: Her behavior is normal. Judgment and thought content normal.  
 
 
 
ASSESSMENT and PLAN Diagnoses and all orders for this visit: 1. Cerebral thrombosis with cerebral infarction ([address] On aspirin. Not taking statin. 2. Gait abnormality She has received PT several weeks back. She thinks that she she will be benefited if she do another session of PT. Will refer, 
-     REFERRAL TO PHYSICAL THERAPY 3. Chronic obstructive pulmonary disease, unspecified COPD type ([address] On Breo Ellipta. Advised to take Mucinex as needed when cough get worse. 4.Dementia Stable. On Namenda. Discussed expected course/resolution/complications of diagnosis in detail with patient. Medication risks/benefits/costs/interactions/alternatives discussed with patient. Pt was given an after visit summary which includes diagnoses, current medications & vitals. Pt expressed understanding with the diagnosis and plan.

## 2018-12-04 NOTE — PROGRESS NOTES
Health Maintenance Due Topic Date Due  
 DTaP/Tdap/Td series (1 - Tdap) 10/21/1951  Shingrix Vaccine Age 50> (1 of 2) 10/21/1980  GLAUCOMA SCREENING Q2Y  04/09/2017 Chief Complaint Patient presents with  Cholesterol Problem  Neurologic Problem  
  had PT and improved gait, but would like to gain more strength and would like to resume PT  
 
 
1. Have you been to the ER, urgent care clinic since your last visit? Hospitalized since your last visit? No 
 
2. Have you seen or consulted any other health care providers outside of the [address] [place] [address] since your last visit? Include any pap smears or colon screening. No 
 
3) Do you have an Advance Directive on file? yes 4) Are you interested in receiving information on Advance Directives? NO Patient is accompanied by  and daughter I have received verbal consent from Norm Gil to discuss any/all medical information while they are present in the room.

## 2018-12-05 LAB
HBA1C MFR BLD: 6 % (ref 4.8–5.6)
SPECIMEN STATUS REPORT, ROLRST: NORMAL

## 2019-02-05 DIAGNOSIS — G10 HEREDITARY CHOREA (HCC): ICD-10-CM

## 2019-02-05 RX ORDER — CLONAZEPAM 0.5 MG/1
TABLET ORAL
Qty: 45 TAB | Refills: 2 | Status: SHIPPED | OUTPATIENT
Start: 2019-02-05 | End: 2019-04-09 | Stop reason: SDUPTHER

## 2019-04-09 ENCOUNTER — OFFICE VISIT (OUTPATIENT)
Dept: INTERNAL MEDICINE CLINIC | Age: 84
End: 2019-04-09

## 2019-04-09 VITALS
OXYGEN SATURATION: 95 % | RESPIRATION RATE: 16 BRPM | HEIGHT: 62 IN | BODY MASS INDEX: 30.07 KG/M2 | SYSTOLIC BLOOD PRESSURE: 105 MMHG | HEART RATE: 93 BPM | WEIGHT: 163.4 LBS | TEMPERATURE: 97.4 F | DIASTOLIC BLOOD PRESSURE: 72 MMHG

## 2019-04-09 DIAGNOSIS — G30.1 LATE ONSET ALZHEIMER'S DISEASE WITHOUT BEHAVIORAL DISTURBANCE (HCC): ICD-10-CM

## 2019-04-09 DIAGNOSIS — J43.1 PANLOBULAR EMPHYSEMA (HCC): Primary | ICD-10-CM

## 2019-04-09 DIAGNOSIS — F02.80 LATE ONSET ALZHEIMER'S DISEASE WITHOUT BEHAVIORAL DISTURBANCE (HCC): ICD-10-CM

## 2019-04-09 DIAGNOSIS — E78.5 DYSLIPIDEMIA: ICD-10-CM

## 2019-04-09 DIAGNOSIS — G10 HEREDITARY CHOREA (HCC): ICD-10-CM

## 2019-04-09 DIAGNOSIS — N39.3 STRESS INCONTINENCE: ICD-10-CM

## 2019-04-09 DIAGNOSIS — R26.9 GAIT ABNORMALITY: ICD-10-CM

## 2019-04-09 DIAGNOSIS — R73.03 PREDIABETES: ICD-10-CM

## 2019-04-09 RX ORDER — CLONAZEPAM 0.5 MG/1
TABLET ORAL
Qty: 45 TAB | Refills: 2 | Status: SHIPPED | OUTPATIENT
Start: 2019-04-09 | End: 2019-07-09 | Stop reason: SDUPTHER

## 2019-04-09 NOTE — PROGRESS NOTES
HISTORY OF PRESENT ILLNESS Angie Manuel is a 80 y.o. female. Angie Manuel is a 80 y.o. female here for follow-up. Has prediabetes, weight seems stable. Need lab work. Had stroke in the past.  Using electric scooter. Gait is still adequate. Need help with bathing and grooming. 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 Need refill on Klonopin. Has COPD, using Breo Ellipta inhaler. No shortness of breath or wheezing. Has dementia. stable. able to tolerate namenda well. Report urinary incontinence when she is trying to stand. Using diaper. She is uncomfortable. Dementia Her past medical history is significant for COPD. Gait Problem Cholesterol Problem COPD Anxiety Review of Systems Constitutional: Negative. HENT: Negative. Eyes: Positive for itching. Respiratory: Negative. Cardiovascular: Negative. Gastrointestinal: Negative. Genitourinary: Negative. Musculoskeletal: Positive for gait problem. Skin: Negative. Psychiatric/Behavioral: Positive for memory loss. The patient is nervous/anxious. Physical Exam  
Constitutional: She appears well-developed and well-nourished. No distress. Eyes: Right eye exhibits no discharge. Left eye exhibits no discharge. No scleral icterus. Neck: Normal range of motion. Neck supple. No tracheal deviation present. No thyromegaly present. Cardiovascular: Normal rate, regular rhythm, normal heart sounds and intact distal pulses. Pulmonary/Chest: Effort normal and breath sounds normal. No respiratory distress. She has no wheezes. Musculoskeletal: She exhibits no edema or tenderness. Psychiatric: She has a normal mood and affect. Her behavior is normal.  
. 
Follow-up Dementia Anxiety Review of Systems HENT: Negative. Eyes: Negative. Respiratory: Negative. Cardiovascular: Negative. Gastrointestinal: Negative. Musculoskeletal: Positive for joint pain. Negative for falls. Skin: Negative. Neurological: Negative. Negative for loss of consciousness. Psychiatric/Behavioral: Positive for memory loss. The patient is nervous/anxious. Physical Exam  
Constitutional: She appears well-developed and well-nourished. No distress. Neck: Normal range of motion. Neck supple. No JVD present. No thyromegaly present. Cardiovascular: Normal rate, regular rhythm, normal heart sounds and intact distal pulses. Pulmonary/Chest: Effort normal and breath sounds normal. No respiratory distress. She has no wheezes. She has no rales. Musculoskeletal: She exhibits tenderness. Neurological: Coordination abnormal.  
Abnormal movement or arms and legs. Psychiatric: Her behavior is normal. Judgment and thought content normal.  
 
 
 
ASSESSMENT and PLAN Diagnoses and all orders for this visit: 1. Panlobular emphysema ([address] [facility] well. [facility]. 2. Late onset Alzheimer's disease without behavioral disturbance Stable, on Namenda.  takes care of her. 3. Hereditary chorea ([address] Has improved. Will refill, 
-     clonazePAM (KLONOPIN) 0.5 mg tablet; TAKE 1/2 TAB IN THE MORNING AND 1 TAB IN THE EVENING 4. Dyslipidemia Be on low-cholesterol diet. Will check, 
-     METABOLIC PANEL, COMPREHENSIVE 
-     LDL, DIRECT 5. Prediabetes Watch sweets and carbohydrate. Will repeat, 
-     HEMOGLOBIN A1C WITH EAG 6. Stress incontinence She has no tone in her bladder sphincter. Need to get Botox injection. Will refer, 
-     REFERRAL TO UROLOGY 7. Gait abnormality Home health aide to help her with bathing and grooming. Will refer, 
-     [address] [place] [address] Discussed expected course/resolution/complications of diagnosis in detail with patient. Medication risks/benefits/costs/interactions/alternatives discussed with patient.   
Pt was given an after visit summary which includes diagnoses, current medications & vitals. Pt expressed understanding with the diagnosis and plan.

## 2019-04-09 NOTE — PROGRESS NOTES
Health Maintenance Due Topic Date Due  
 DTaP/Tdap/Td series (1 - Tdap) 10[birth year]/1951  Shingrix Vaccine Age 50> (1 of 2) 10/21/1980  GLAUCOMA SCREENING Q2Y  04/09/2017 Chief Complaint Patient presents with  Cholesterol Problem  COPD  Dementia 1. Have you been to the ER, urgent care clinic since your last visit? Hospitalized since your last visit? No 
 
2. Have you seen or consulted any other health care providers outside of the [address] [place] [address] since your last visit? Include any pap smears or colon screening. No 
 
3) Do you have an Advance Directive on file? yes 4) Are you interested in receiving information on Advance Directives? NO Patient is accompanied by spouse I have received verbal consent from Rik Capone to discuss any/all medical information while they are present in the room.

## 2019-04-10 LAB
ALBUMIN SERPL-MCNC: 3.8 G/DL (ref 3.5–4.7)
ALBUMIN/GLOB SERPL: 1.3 {RATIO} (ref 1.2–2.2)
ALP SERPL-CCNC: 84 IU/L (ref 39–117)
ALT SERPL-CCNC: 9 IU/L (ref 0–32)
AST SERPL-CCNC: 19 IU/L (ref 0–40)
BILIRUB SERPL-MCNC: 0.4 MG/DL (ref 0–1.2)
BUN SERPL-MCNC: 15 MG/DL (ref 8–27)
BUN/CREAT SERPL: 15 (ref 12–28)
CALCIUM SERPL-MCNC: 9.3 MG/DL (ref 8.7–10.3)
CHLORIDE SERPL-SCNC: 103 MMOL/L (ref 96–106)
CO2 SERPL-SCNC: 24 MMOL/L (ref 20–29)
CREAT SERPL-MCNC: 1 MG/DL (ref 0.57–1)
EST. AVERAGE GLUCOSE BLD GHB EST-MCNC: 123 MG/DL
GLOBULIN SER CALC-MCNC: 3 G/DL (ref 1.5–4.5)
GLUCOSE SERPL-MCNC: 87 MG/DL (ref 65–99)
HBA1C MFR BLD: 5.9 % (ref 4.8–5.6)
INTERPRETATION: NORMAL
LDLC SERPL DIRECT ASSAY-MCNC: 108 MG/DL (ref 0–99)
POTASSIUM SERPL-SCNC: 4.2 MMOL/L (ref 3.5–5.2)
PROT SERPL-MCNC: 6.8 G/DL (ref 6–8.5)
SODIUM SERPL-SCNC: 141 MMOL/L (ref 134–144)

## 2019-05-15 ENCOUNTER — TELEPHONE (OUTPATIENT)
Dept: INTERNAL MEDICINE CLINIC | Age: 84
End: 2019-05-15

## 2019-05-15 NOTE — TELEPHONE ENCOUNTER
Ge Francis wants to continue therapy for this patient 1x per week for 3 weeks  Please contact [employer] [phone]

## 2019-05-28 DIAGNOSIS — L50.9 URTICARIA: ICD-10-CM

## 2019-05-28 RX ORDER — AZELASTINE HYDROCHLORIDE 0.5 MG/ML
1 SOLUTION/ DROPS OPHTHALMIC
Qty: 6 ML | Refills: 1 | Status: SHIPPED | OUTPATIENT
Start: 2019-05-28 | End: 2021-07-08 | Stop reason: ALTCHOICE

## 2019-07-09 DIAGNOSIS — G10 HEREDITARY CHOREA (HCC): ICD-10-CM

## 2019-07-09 RX ORDER — CLONAZEPAM 0.5 MG/1
TABLET ORAL
Qty: 45 TAB | Refills: 2 | Status: SHIPPED | OUTPATIENT
Start: 2019-07-09 | End: 2019-08-06 | Stop reason: SDUPTHER

## 2019-08-06 ENCOUNTER — OFFICE VISIT (OUTPATIENT)
Dept: INTERNAL MEDICINE CLINIC | Age: 84
End: 2019-08-06

## 2019-08-06 VITALS
BODY MASS INDEX: 31.21 KG/M2 | OXYGEN SATURATION: 95 % | DIASTOLIC BLOOD PRESSURE: 74 MMHG | RESPIRATION RATE: 15 BRPM | SYSTOLIC BLOOD PRESSURE: 132 MMHG | HEART RATE: 68 BPM | WEIGHT: 169.6 LBS | TEMPERATURE: 96.6 F | HEIGHT: 62 IN

## 2019-08-06 DIAGNOSIS — J44.9 CHRONIC OBSTRUCTIVE PULMONARY DISEASE, UNSPECIFIED COPD TYPE (HCC): Primary | ICD-10-CM

## 2019-08-06 DIAGNOSIS — R73.03 PREDIABETES: ICD-10-CM

## 2019-08-06 DIAGNOSIS — Z00.00 MEDICARE ANNUAL WELLNESS VISIT, SUBSEQUENT: ICD-10-CM

## 2019-08-06 DIAGNOSIS — G10 HEREDITARY CHOREA (HCC): ICD-10-CM

## 2019-08-06 DIAGNOSIS — E78.5 DYSLIPIDEMIA: ICD-10-CM

## 2019-08-06 DIAGNOSIS — E55.9 VITAMIN D DEFICIENCY: ICD-10-CM

## 2019-08-06 RX ORDER — CLONAZEPAM 0.5 MG/1
TABLET ORAL
Qty: 45 TAB | Refills: 2 | Status: SHIPPED | OUTPATIENT
Start: 2019-08-06 | End: 2019-12-18

## 2019-08-06 RX ORDER — MIRABEGRON 25 MG/1
TABLET, FILM COATED, EXTENDED RELEASE ORAL
Refills: 0 | COMMUNITY
Start: 2019-07-25 | End: 2019-11-11 | Stop reason: SINTOL

## 2019-08-06 NOTE — PROGRESS NOTES
HISTORY OF PRESENT ILLNESS  Kristyn Zeng is a 80 y.o. female. here for follow-up. Noticed knee pain and leg pain and leg swelling on and off for last several weeks. No shortness of breath orthopnea. Has COPD, using Breo Ellipta. Doing well. .  Had stroke in the past.  Gait is okay for now. 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 Need refill on Klonopin. Has dementia. stable. able to tolerate namenda well. Labs are reviewed. Need lab work. Here for Medicare wellness visit. Cholesterol Problem     Neurologic Problem   Primary symptoms include memory loss. Dementia      Knee Pain         Review of Systems   Constitutional: Negative. HENT: Negative. Respiratory: Negative. Cardiovascular: Negative. Gastrointestinal: Negative. Genitourinary: Negative. Musculoskeletal: Positive for gait problem. Skin: Negative. Psychiatric/Behavioral: Positive for memory loss. The patient is nervous/anxious. Physical Exam   Constitutional: She appears well-developed and well-nourished. No distress. Eyes: EOM are normal. Right eye exhibits no discharge. Left eye exhibits no discharge. No scleral icterus. Neck: Normal range of motion. Neck supple. No tracheal deviation present. No thyromegaly present. Cardiovascular: Normal rate, regular rhythm, normal heart sounds and intact distal pulses. Pulmonary/Chest: Effort normal and breath sounds normal. No respiratory distress. She has no wheezes. Musculoskeletal: She exhibits edema. She exhibits no tenderness. Trace edema   Psychiatric: She has a normal mood and affect. Her behavior is normal.     Follow-up       Dementia      Anxiety         Review of Systems   HENT: Negative. Eyes: Negative. Respiratory: Negative. Cardiovascular: Negative. Gastrointestinal: Negative. Musculoskeletal: Positive for joint pain. Negative for falls. Skin: Negative. Neurological: Negative. Negative for loss of consciousness. Psychiatric/Behavioral: Positive for memory loss. The patient is nervous/anxious. Physical Exam   Constitutional: She appears well-developed and well-nourished. No distress. Neck: Normal range of motion. Neck supple. No JVD present. No thyromegaly present. Cardiovascular: Normal rate, regular rhythm, normal heart sounds and intact distal pulses. Pulmonary/Chest: Effort normal and breath sounds normal. No respiratory distress. She has no wheezes. She has no rales. Musculoskeletal: She exhibits tenderness. Neurological: Coordination abnormal.   Abnormal movement or arms and legs. Psychiatric: Her behavior is normal. Judgment and thought content normal.         ASSESSMENT and PLAN    Diagnoses and all orders for this visit:    1. Chronic obstructive pulmonary disease, unspecified COPD type ([address]    On breo. Stable. Will check,  -     CBC W/O DIFF    2. Hereditary chorea (HCC)    Stable. Will refill,  -     clonazePAM (KLONOPIN) 0.5 mg tablet; TAKE 1/2 TAB IN THE MORNING AND 1 TAB IN THE EVENING    3. Dyslipidemia    LDL is stable. Will check,  -     METABOLIC PANEL, COMPREHENSIVE    4. Medicare annual wellness visit, subsequent    5. Prediabetes    Watch carbohydrate. Will check,  -     METABOLIC PANEL, COMPREHENSIVE  -     HEMOGLOBIN A1C WITH EAG    6. Vitamin D deficiency  -     VITAMIN D, 25 HYDROXY          Discussed expected course/resolution/complications of diagnosis in detail with patient. Medication risks/benefits/costs/interactions/alternatives discussed with patient. Pt was given an after visit summary which includes diagnoses, current medications & vitals. Pt expressed understanding with the diagnosis and plan.

## 2019-08-06 NOTE — PROGRESS NOTES
This is the Subsequent Medicare Annual Wellness Exam, performed 12 months or more after the Initial AWV or the last Subsequent AWV    I have reviewed the patient's medical history in detail and updated the computerized patient record. 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,TIA    Unspecified sleep apnea     NO CPAP      Past Surgical History:   Procedure Laterality Date    ABDOMEN SURGERY PROC UNLISTED      hernia repair    HX GYN  1963    c section    HX HEENT      status post T&A    HX OOPHORECTOMY      HX ORTHOPAEDIC      status post left total knee replacement     Current Outpatient Medications   Medication Sig Dispense Refill    MYRBETRIQ 25 mg ER tablet   0    clonazePAM (KLONOPIN) 0.5 mg tablet TAKE 1/2 TAB IN THE MORNING AND 1 TAB IN THE EVENING 45 Tab 2    azelastine (OPTIVAR) 0.05 % ophthalmic solution Administer 1 Drop to right eye two (2) times daily as needed (allergy). Use in affected eye(s) 6 mL 1    memantine (NAMENDA) 10 mg tablet Take 1 Tab by mouth two (2) times a day. 180 Tab 1    fluticasone-vilanterol (BREO ELLIPTA) 100-25 mcg/dose inhaler Take 1 Puff by inhalation daily. (Patient taking differently: Take 1 Puff by inhalation daily as needed.) 1 Inhaler 5    acetaminophen (TYLENOL) 500 mg tablet Take 1 Tab by mouth two (2) times daily as needed for Pain. 60 Tab 3    aspirin delayed-release (ECOTRIN LOW STRENGTH) 81 mg tablet Take 1 Tab by mouth daily. 30 Tab 12    calcium citrate-vitamin d3 (CITRACAL+D) 315-200 mg-unit tab Take 1 Tab by mouth daily (with breakfast).  30 Tab 12     Allergies   Allergen Reactions    Keflex [Cephalexin] Rash     Family History   Problem Relation Age of Onset    Hypertension Maternal Grandmother     Hypertension Maternal Grandfather     Hypertension Paternal Grandmother     Hypertension Paternal Grandfather     MS Daughter     No Known Problems Mother     No Known Problems Father      Social History     Tobacco Use    Smoking status: Former Smoker     Packs/day: 1.00     Years: 40.00     Pack years: 40.00     Last attempt to quit: 1982     Years since quittin.1    Smokeless tobacco: Never Used   Substance Use Topics    Alcohol use: No     Patient Active Problem List   Diagnosis Code    Cerebral thrombosis with cerebral infarction ([facility] I63.30    COPD (chronic obstructive pulmonary disease) ([facility]) J44.9    Chronic respiratory failure with hypoxia ([facility]) J96.11    Sinus tachycardia R00.0    S/P knee replacement Z96.659    Obstructive sleep apnea G47.33    Dyslipidemia E78.5    Debility R53.81    Nausea and vomiting R11.2    UTI (urinary tract infection) N39.0    [facility] G10    Late onset Alzheimer's disease without behavioral disturbance G30.1, F02.80       Depression Risk Factor Screening:     3 most recent PHQ Screens 2019   Little interest or pleasure in doing things Not at all   Feeling down, depressed, irritable, or hopeless Not at all   Total Score PHQ 2 0     Alcohol Risk Factor Screening: You do not drink alcohol or very rarely. Functional Ability and Level of Safety:   Hearing Loss  Hearing is good. Activities of Daily Living  The home contains: walker  Patient needs help with:  transportation, preparing meals, laundry, housework, managing medications and managing money    Fall Risk  Fall Risk Assessment, last 12 mths 2019   Able to walk? Yes   Fall in past 12 months?  No   Fall with injury? -   Number of falls in past 12 months -   Fall Risk Score -       Abuse Screen  Patient is not abused    Cognitive Screening   Evaluation of Cognitive Function:  Has your family/caregiver stated any concerns about your memory: yes  Abnormal    Patient Care Team   Patient Care Team:  Nayeli Coello MD as PCP - General (Internal Medicine)  Dina Arias, Remi Florentino LPN as Ambulatory Care Navigator (Internal Medicine)  Tracie uRffin RN    Assessment/Plan   Education and counseling provided:  Are appropriate based on today's review and evaluation  End-of-Life planning (with patient's consent)  Bone mass measurement (DEXA)  Screening for glaucoma  Diabetes screening test      Health Maintenance Due   Topic Date Due    DTaP/Tdap/Td series (1 - Tdap) 10[birth year]/1951    Shingrix Vaccine Age 50> (1 of 2) 10/21/1980    GLAUCOMA SCREENING Q2Y  04/09/2017    Influenza Age 9 to Adult  08/01/2019

## 2019-08-06 NOTE — PROGRESS NOTES
Health Maintenance Due   Topic Date Due    DTaP/Tdap/Td series (1 - Tdap) 10/21/1951    Shingrix Vaccine Age 50> (1 of 2) 10/21/1980    GLAUCOMA SCREENING Q2Y  04/09/2017    MEDICARE YEARLY EXAM  06/13/2019    Influenza Age 9 to Adult  08/01/2019       Chief Complaint   Patient presents with   [facility] Annual Wellness Visit       1. Have you been to the ER, urgent care clinic since your last visit? Hospitalized since your last visit? No    2. Have you seen or consulted any other health care providers outside of the [address] [place] [address] since your last visit? Include any pap smears or colon screening. No    3) Do you have an Advance Directive on file? yes    4) Are you interested in receiving information on Advance Directives? NO      Patient is accompanied by  I have received verbal consent from Elva Montalvo to discuss any/all medical information while they are present in the room.

## 2019-08-06 NOTE — PATIENT INSTRUCTIONS
Medicare Wellness Visit, Female The best way to live healthy is to have a lifestyle where you eat a well-balanced diet, exercise regularly, limit alcohol use, and quit all forms of tobacco/nicotine, if applicable. Regular preventive services are another way to keep healthy. Preventive services (vaccines, screening tests, monitoring & exams) can help personalize your care plan, which helps you manage your own care. Screening tests can find health problems at the earliest stages, when they are easiest to treat. [facility] follows the current, evidence-based guidelines published by the [facility] Yasmin ([facility]) when recommending preventive services for our patients. Because we follow these guidelines, sometimes recommendations change over time as research supports it. (For example, mammograms used to be recommended annually. Even though Medicare will still pay for an annual mammogram, the newer guidelines recommend a mammogram every two years for women of average risk.) Of course, you and your doctor may decide to screen more often for some diseases, based on your risk and your health status. Preventive services for you include: - Medicare offers their members a free annual wellness visit, which is time for you and your primary care provider to discuss and plan for your preventive service needs. Take advantage of this benefit every year! 
-All adults over the age of 72 should receive the recommended pneumonia vaccines. Current USPSTF guidelines recommend a series of two vaccines for the best pneumonia protection.  
-All adults should have a flu vaccine yearly and a tetanus vaccine every 10 years. All adults age 61 and older should receive a shingles vaccine once in their lifetime.   
-A bone mass density test is recommended when a woman turns 65 to screen for osteoporosis. This test is only recommended one time, as a screening. Some providers will use this same test as a disease monitoring tool if you already have osteoporosis. -All adults age 38-68 who are overweight should have a diabetes screening test once every three years.  
-Other screening tests and preventive services for persons with diabetes include: an eye exam to screen for diabetic retinopathy, a kidney function test, a foot exam, and stricter control over your cholesterol.  
-Cardiovascular screening for adults with routine risk involves an electrocardiogram (ECG) at intervals determined by your doctor.  
-Colorectal cancer screenings should be done for adults age 54-65 with no increased risk factors for colorectal cancer. There are a number of acceptable methods of screening for this type of cancer. Each test has its own benefits and drawbacks. Discuss with your doctor what is most appropriate for you during your annual wellness visit. The different tests include: colonoscopy (considered the best screening method), a fecal occult blood test, a fecal DNA test, and sigmoidoscopy. -Breast cancer screenings are recommended every other year for women of normal risk, age 54-69. 
-Cervical cancer screenings for women over age 72 are only recommended with certain risk factors.  
-All adults born between [facility] should be screened once for Hepatitis C. Here is a list of your current Health Maintenance items (your personalized list of preventive services) with a due date: 
Health Maintenance Due Topic Date Due  
 DTaP/Tdap/Td  (1 - Tdap) 10/21/1951  Shingles Vaccine (1 of 2) 10/21/1980  Glaucoma Screening   04/09/2017  Flu Vaccine  08/01/2019

## 2019-08-07 LAB
25(OH)D3+25(OH)D2 SERPL-MCNC: 29.6 NG/ML (ref 30–100)
ALBUMIN SERPL-MCNC: 3.7 G/DL (ref 3.5–4.7)
ALBUMIN/GLOB SERPL: 1.3 {RATIO} (ref 1.2–2.2)
ALP SERPL-CCNC: 79 IU/L (ref 39–117)
ALT SERPL-CCNC: 10 IU/L (ref 0–32)
AST SERPL-CCNC: 19 IU/L (ref 0–40)
BILIRUB SERPL-MCNC: 0.4 MG/DL (ref 0–1.2)
BUN SERPL-MCNC: 14 MG/DL (ref 8–27)
BUN/CREAT SERPL: 15 (ref 12–28)
CALCIUM SERPL-MCNC: 9 MG/DL (ref 8.7–10.3)
CHLORIDE SERPL-SCNC: 104 MMOL/L (ref 96–106)
CO2 SERPL-SCNC: 28 MMOL/L (ref 20–29)
CREAT SERPL-MCNC: 0.96 MG/DL (ref 0.57–1)
ERYTHROCYTE [DISTWIDTH] IN BLOOD BY AUTOMATED COUNT: 15.6 % (ref 12.3–15.4)
EST. AVERAGE GLUCOSE BLD GHB EST-MCNC: 126 MG/DL
GLOBULIN SER CALC-MCNC: 2.9 G/DL (ref 1.5–4.5)
GLUCOSE SERPL-MCNC: 94 MG/DL (ref 65–99)
HBA1C MFR BLD: 6 % (ref 4.8–5.6)
HCT VFR BLD AUTO: 39.4 % (ref 34–46.6)
HGB BLD-MCNC: 12.2 G/DL (ref 11.1–15.9)
INTERPRETATION: NORMAL
MCH RBC QN AUTO: 27.5 PG (ref 26.6–33)
MCHC RBC AUTO-ENTMCNC: 31 G/DL (ref 31.5–35.7)
MCV RBC AUTO: 89 FL (ref 79–97)
PLATELET # BLD AUTO: 219 X10E3/UL (ref 150–450)
POTASSIUM SERPL-SCNC: 4.3 MMOL/L (ref 3.5–5.2)
PROT SERPL-MCNC: 6.6 G/DL (ref 6–8.5)
RBC # BLD AUTO: 4.44 X10E6/UL (ref 3.77–5.28)
SODIUM SERPL-SCNC: 144 MMOL/L (ref 134–144)
WBC # BLD AUTO: 5.8 X10E3/UL (ref 3.4–10.8)

## 2019-08-07 NOTE — PROGRESS NOTES
Slightly low vitamin D, advised to take vitamin D3 1000 units once a day for 4 months. All other labs are stable.

## 2019-08-28 DIAGNOSIS — F02.80 LATE ONSET ALZHEIMER'S DISEASE WITHOUT BEHAVIORAL DISTURBANCE (HCC): ICD-10-CM

## 2019-08-28 DIAGNOSIS — G30.1 LATE ONSET ALZHEIMER'S DISEASE WITHOUT BEHAVIORAL DISTURBANCE (HCC): ICD-10-CM

## 2019-08-28 RX ORDER — MEMANTINE HYDROCHLORIDE 10 MG/1
TABLET ORAL
Qty: 180 TAB | Refills: 1 | Status: SHIPPED | OUTPATIENT
Start: 2019-08-28 | End: 2019-11-11 | Stop reason: SDUPTHER

## 2019-10-28 ENCOUNTER — CLINICAL SUPPORT (OUTPATIENT)
Dept: INTERNAL MEDICINE CLINIC | Age: 84
End: 2019-10-28

## 2019-10-28 VITALS — HEIGHT: 62 IN | BODY MASS INDEX: 31.02 KG/M2 | TEMPERATURE: 97.6 F

## 2019-10-28 DIAGNOSIS — Z23 ENCOUNTER FOR IMMUNIZATION: Primary | ICD-10-CM

## 2019-11-11 ENCOUNTER — OFFICE VISIT (OUTPATIENT)
Dept: INTERNAL MEDICINE CLINIC | Age: 84
End: 2019-11-11

## 2019-11-11 VITALS
TEMPERATURE: 97.8 F | RESPIRATION RATE: 20 BRPM | WEIGHT: 170.2 LBS | HEART RATE: 77 BPM | HEIGHT: 62 IN | SYSTOLIC BLOOD PRESSURE: 140 MMHG | DIASTOLIC BLOOD PRESSURE: 80 MMHG | BODY MASS INDEX: 31.32 KG/M2 | OXYGEN SATURATION: 94 %

## 2019-11-11 DIAGNOSIS — R68.2 DRY MOUTH: ICD-10-CM

## 2019-11-11 DIAGNOSIS — R73.03 PREDIABETES: ICD-10-CM

## 2019-11-11 DIAGNOSIS — R53.83 FATIGUE, UNSPECIFIED TYPE: ICD-10-CM

## 2019-11-11 DIAGNOSIS — J44.9 CHRONIC OBSTRUCTIVE PULMONARY DISEASE, UNSPECIFIED COPD TYPE (HCC): ICD-10-CM

## 2019-11-11 DIAGNOSIS — F03.90 DEMENTIA WITHOUT BEHAVIORAL DISTURBANCE, UNSPECIFIED DEMENTIA TYPE: ICD-10-CM

## 2019-11-11 DIAGNOSIS — R32 URINARY INCONTINENCE, UNSPECIFIED TYPE: ICD-10-CM

## 2019-11-11 DIAGNOSIS — N32.81 OAB (OVERACTIVE BLADDER): ICD-10-CM

## 2019-11-11 DIAGNOSIS — G10 HEREDITARY CHOREA (HCC): ICD-10-CM

## 2019-11-11 DIAGNOSIS — R53.1 WEAKNESS GENERALIZED: Primary | ICD-10-CM

## 2019-11-11 NOTE — PROGRESS NOTES
HISTORY OF PRESENT ILLNESS  Breanna Chan is a 80 y.o. female. This is a patient of Dr. Fox Aaron who presents today with her daughter with complaints of fatigue, weakness, and dry mouth. The patient describes feeling very tired, sleeping much of the day. She says she sleeps well at night, without frequent awakenings. Her daughter mentions she has seemed to have increased weakness and been off-balance, at times. Patient states her appetite is fair. She has complaints of dry mouth. Patient is unsure how long symptoms have been occurring, but she does note that she started a new medication, Myrbetriq, at time of last urology appt in July 2019. She states Myrbetriq has helped some with incontinence overnight, but she continues with incontinence when she is awake during the day. Visit Vitals  /80 (BP 1 Location: Left arm, BP Patient Position: Sitting)   Pulse 77   Temp 97.8 °F (36.6 °C) (Oral)   Resp 20   Ht 5' 2\" (1.575 m)   Wt 170 lb 3.2 oz (77.2 kg)   SpO2 94%   BMI 31.13 kg/m²     HPI    Review of Systems   Constitutional: Positive for malaise/fatigue. Negative for chills and fever. HENT:        Dry mouth   Respiratory: Negative for cough. Cardiovascular: Negative for chest pain. Gastrointestinal: Negative for abdominal pain. Genitourinary: Negative. Musculoskeletal: Negative. Skin: Negative. Neurological: Positive for weakness. Negative for dizziness, sensory change, speech change and focal weakness. Endo/Heme/Allergies: Negative. Psychiatric/Behavioral: Negative. Physical Exam   Constitutional: She is oriented to person, place, and time. She appears well-developed and well-nourished. No distress. HENT:   Head: Normocephalic and atraumatic. Eyes: Pupils are equal, round, and reactive to light. Conjunctivae are normal.   Neck: Neck supple. Cardiovascular: Normal rate and regular rhythm. Pulmonary/Chest: Effort normal and breath sounds normal. No respiratory distress. She has no wheezes. She has no rales. Abdominal: Soft. Bowel sounds are normal. She exhibits no distension. There is no tenderness. Musculoskeletal: She exhibits no edema. Lymphadenopathy:     She has no cervical adenopathy. Neurological: She is alert and oriented to person, place, and time. Skin: Skin is warm and dry. Psychiatric: She has a normal mood and affect. Nursing note and vitals reviewed. ASSESSMENT and PLAN  Encounter Diagnoses   Name Primary?  Weakness generalized Yes    Fatigue, unspecified type     Dry mouth     Chronic obstructive pulmonary disease, unspecified COPD type (HCC)     Hereditary chorea (HCC)     Prediabetes     Dementia without behavioral disturbance, unspecified dementia type ([facility] [address]     OAB (overactive bladder)     Urinary incontinence, unspecified type      Will trial off of Myrbetriq. Will order   CBC WITH AUTOMATED DIFF    METABOLIC PANEL, COMPREHENSIVE    TSH 3RD GENERATION    VITAMIN D, 25 HYDROXY    HEMOGLOBIN A1C WITH EAG     Will refer,  REFERRAL TO PHYSICAL THERAPY     Lab results and schedule of future lab studies reviewed with patient  Reviewed medications and side effects in detail    Patient encouraged to call or return to office if symptoms do not improve or worsen. Reviewed plan of care with patient who acknowledges understanding and agrees. Follow-up with Dr. Olamide Landa in 2 weeks, or sooner as needed.

## 2019-11-11 NOTE — PROGRESS NOTES
Health Maintenance Due   Topic Date Due    DTaP/Tdap/Td series (1 - Tdap) 10[birth year]/1951    Shingrix Vaccine Age 50> (1 of 2) 10/21/1980    GLAUCOMA SCREENING Q2Y  04/09/2017       Chief Complaint   Patient presents with    Fatigue    Extremity Weakness    Ankle swelling       1. Have you been to the ER, urgent care clinic since your last visit? Hospitalized since your last visit? No    2. Have you seen or consulted any other health care providers outside of the [address] [place] [address] since your last visit? Include any pap smears or colon screening. No    3) Do you have an Advance Directive on file? yes    4) Are you interested in receiving information on Advance Directives? NO      Patient is accompanied by self and daughter I have received verbal consent from Linh Kee to discuss any/all medical information while they are present in the room.

## 2019-11-12 LAB
25(OH)D3+25(OH)D2 SERPL-MCNC: 37.7 NG/ML (ref 30–100)
ALBUMIN SERPL-MCNC: 4 G/DL (ref 3.5–4.7)
ALBUMIN/GLOB SERPL: 1.3 {RATIO} (ref 1.2–2.2)
ALP SERPL-CCNC: 80 IU/L (ref 39–117)
ALT SERPL-CCNC: 10 IU/L (ref 0–32)
AST SERPL-CCNC: 18 IU/L (ref 0–40)
BASOPHILS # BLD AUTO: 0 X10E3/UL (ref 0–0.2)
BASOPHILS NFR BLD AUTO: 1 %
BILIRUB SERPL-MCNC: 0.5 MG/DL (ref 0–1.2)
BUN SERPL-MCNC: 15 MG/DL (ref 8–27)
BUN/CREAT SERPL: 15 (ref 12–28)
CALCIUM SERPL-MCNC: 9.4 MG/DL (ref 8.7–10.3)
CHLORIDE SERPL-SCNC: 105 MMOL/L (ref 96–106)
CO2 SERPL-SCNC: 26 MMOL/L (ref 20–29)
CREAT SERPL-MCNC: 1.01 MG/DL (ref 0.57–1)
EOSINOPHIL # BLD AUTO: 0.2 X10E3/UL (ref 0–0.4)
EOSINOPHIL NFR BLD AUTO: 4 %
ERYTHROCYTE [DISTWIDTH] IN BLOOD BY AUTOMATED COUNT: 14.6 % (ref 12.3–15.4)
EST. AVERAGE GLUCOSE BLD GHB EST-MCNC: 120 MG/DL
GLOBULIN SER CALC-MCNC: 3.1 G/DL (ref 1.5–4.5)
GLUCOSE SERPL-MCNC: 102 MG/DL (ref 65–99)
HBA1C MFR BLD: 5.8 % (ref 4.8–5.6)
HCT VFR BLD AUTO: 42.3 % (ref 34–46.6)
HGB BLD-MCNC: 13.4 G/DL (ref 11.1–15.9)
IMM GRANULOCYTES # BLD AUTO: 0 X10E3/UL (ref 0–0.1)
IMM GRANULOCYTES NFR BLD AUTO: 0 %
INTERPRETATION: NORMAL
LYMPHOCYTES # BLD AUTO: 1.7 X10E3/UL (ref 0.7–3.1)
LYMPHOCYTES NFR BLD AUTO: 27 %
MCH RBC QN AUTO: 28.2 PG (ref 26.6–33)
MCHC RBC AUTO-ENTMCNC: 31.7 G/DL (ref 31.5–35.7)
MCV RBC AUTO: 89 FL (ref 79–97)
MONOCYTES # BLD AUTO: 0.5 X10E3/UL (ref 0.1–0.9)
MONOCYTES NFR BLD AUTO: 8 %
NEUTROPHILS # BLD AUTO: 3.8 X10E3/UL (ref 1.4–7)
NEUTROPHILS NFR BLD AUTO: 60 %
PLATELET # BLD AUTO: 224 X10E3/UL (ref 150–450)
POTASSIUM SERPL-SCNC: 4.1 MMOL/L (ref 3.5–5.2)
PROT SERPL-MCNC: 7.1 G/DL (ref 6–8.5)
RBC # BLD AUTO: 4.76 X10E6/UL (ref 3.77–5.28)
SODIUM SERPL-SCNC: 146 MMOL/L (ref 134–144)
TSH SERPL DL<=0.005 MIU/L-ACNC: 2.53 UIU/ML (ref 0.45–4.5)
WBC # BLD AUTO: 6.3 X10E3/UL (ref 3.4–10.8)

## 2019-11-12 NOTE — PROGRESS NOTES
Creatinine and sodium mildly elevated. Encourage oral water intake, as tolerated.   Otherwise, stable labs

## 2019-11-26 ENCOUNTER — OFFICE VISIT (OUTPATIENT)
Dept: INTERNAL MEDICINE CLINIC | Age: 84
End: 2019-11-26

## 2019-11-26 VITALS
OXYGEN SATURATION: 93 % | HEART RATE: 74 BPM | DIASTOLIC BLOOD PRESSURE: 78 MMHG | BODY MASS INDEX: 31.13 KG/M2 | SYSTOLIC BLOOD PRESSURE: 132 MMHG | HEIGHT: 62 IN | RESPIRATION RATE: 16 BRPM | TEMPERATURE: 97.7 F

## 2019-11-26 DIAGNOSIS — G10 HEREDITARY CHOREA (HCC): ICD-10-CM

## 2019-11-26 DIAGNOSIS — J44.9 CHRONIC OBSTRUCTIVE PULMONARY DISEASE, UNSPECIFIED COPD TYPE (HCC): ICD-10-CM

## 2019-11-26 DIAGNOSIS — R73.03 PREDIABETES: ICD-10-CM

## 2019-11-26 DIAGNOSIS — F02.80 LATE ONSET ALZHEIMER'S DISEASE WITHOUT BEHAVIORAL DISTURBANCE (HCC): Primary | ICD-10-CM

## 2019-11-26 DIAGNOSIS — G30.1 LATE ONSET ALZHEIMER'S DISEASE WITHOUT BEHAVIORAL DISTURBANCE (HCC): Primary | ICD-10-CM

## 2019-11-26 NOTE — PROGRESS NOTES
HISTORY OF PRESENT ILLNESS  Niki Dexter is a 80 y.o. female. here for follow-up. She had profound weakness 2 weeks back. Was seen by my nurse practitioner. She is doing well today. Has all lab work done. All discussed with patient. All labs are stable. Has COPD, using Breo Ellipta. Doing well. .  Had stroke in the past.  Gait is okay for now. 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 Need refill on Klonopin. Has dementia. stable. able to tolerate namenda well. Cholesterol Problem     Dementia      Neurologic Problem   Primary symptoms include memory loss. Fatigue         Review of Systems   Constitutional: Positive for fatigue. Negative for malaise/fatigue. HENT: Negative. Respiratory: Negative. Cardiovascular: Negative. Gastrointestinal: Negative. Genitourinary: Negative. Skin: Negative. Psychiatric/Behavioral: Positive for memory loss. The patient is nervous/anxious. Physical Exam  Constitutional:       General: She is not in acute distress. Appearance: She is well-developed. Eyes:      General: No scleral icterus. Right eye: No discharge. Left eye: No discharge. Neck:      Musculoskeletal: Normal range of motion and neck supple. Thyroid: No thyromegaly. Trachea: No tracheal deviation. Cardiovascular:      Rate and Rhythm: Normal rate and regular rhythm. Heart sounds: Normal heart sounds. Pulmonary:      Effort: Pulmonary effort is normal. No respiratory distress. Breath sounds: Normal breath sounds. No wheezing. Musculoskeletal:         General: No tenderness. Comments: Trace edema   Psychiatric:         Behavior: Behavior normal.       Follow-up       Dementia      Anxiety         Review of Systems   HENT: Negative. Eyes: Negative. Respiratory: Negative. Cardiovascular: Negative. Gastrointestinal: Negative. Musculoskeletal: Positive for joint pain. Negative for falls. Skin: Negative. Neurological: Negative. Negative for loss of consciousness. Psychiatric/Behavioral: Positive for memory loss. The patient is nervous/anxious. Physical Exam   Constitutional: She appears well-developed and well-nourished. No distress. Neck: Normal range of motion. Neck supple. No JVD present. No thyromegaly present. Cardiovascular: Normal rate, regular rhythm, normal heart sounds and intact distal pulses. Pulmonary/Chest: Effort normal and breath sounds normal. No respiratory distress. She has no wheezes. She has no rales. Musculoskeletal: She exhibits tenderness. Neurological: Coordination abnormal.   Abnormal movement or arms and legs. Psychiatric: Her behavior is normal. Judgment and thought content normal.         ASSESSMENT and PLAN    Diagnoses and all orders for this visit:    1. Late onset Alzheimer's disease without behavioral disturbance ([address]    On Namenda. Stable. Labs done, all are stable. 2. Chronic obstructive pulmonary disease, unspecified COPD type ([address]  Using inhaler as needed. Doing well. 3. Prediabetes  Recent A1c is stable. Be on low-carb diet. 4. Hereditary chorea ([address]    On Klonopin. Doing well. Discussed expected course/resolution/complications of diagnosis in detail with patient. Medication risks/benefits/costs/interactions/alternatives discussed with patient. Pt was given an after visit summary which includes diagnoses, current medications & vitals. Pt expressed understanding with the diagnosis and plan.

## 2019-11-26 NOTE — PROGRESS NOTES
Health Maintenance Due   Topic Date Due    DTaP/Tdap/Td series (1 - Tdap) 10/21/1941    Shingrix Vaccine Age 50> (1 of 2) 10/21/1980    GLAUCOMA SCREENING Q2Y  04/09/2017       Chief Complaint   Patient presents with    Fatigue    Dementia       1. Have you been to the ER, urgent care clinic since your last visit? Hospitalized since your last visit? No    2. Have you seen or consulted any other health care providers outside of the [address] [place] [address] since your last visit? Include any pap smears or colon screening. No    3) Do you have an Advance Directive on file? yes    4) Are you interested in receiving information on Advance Directives? NO      Patient is accompanied by  I have received verbal consent from Ina Davalos to discuss any/all medical information while they are present in the room.

## 2019-12-18 DIAGNOSIS — G10 HEREDITARY CHOREA (HCC): ICD-10-CM

## 2019-12-18 RX ORDER — CLONAZEPAM 0.5 MG/1
TABLET ORAL
Qty: 45 TAB | Refills: 0 | Status: SHIPPED | OUTPATIENT
Start: 2019-12-18 | End: 2020-01-16

## 2020-01-16 DIAGNOSIS — G10 HEREDITARY CHOREA (HCC): ICD-10-CM

## 2020-01-16 RX ORDER — CLONAZEPAM 0.5 MG/1
TABLET ORAL
Qty: 45 TAB | Refills: 0 | Status: SHIPPED | OUTPATIENT
Start: 2020-01-16 | End: 2020-02-13

## 2020-02-13 DIAGNOSIS — G10 HEREDITARY CHOREA (HCC): ICD-10-CM

## 2020-02-13 RX ORDER — CLONAZEPAM 0.5 MG/1
TABLET ORAL
Qty: 45 TAB | Refills: 0 | Status: SHIPPED | OUTPATIENT
Start: 2020-02-13 | End: 2020-03-16

## 2020-03-16 DIAGNOSIS — G10 HEREDITARY CHOREA (HCC): ICD-10-CM

## 2020-03-16 RX ORDER — CLONAZEPAM 0.5 MG/1
TABLET ORAL
Qty: 45 TAB | Refills: 0 | Status: SHIPPED | OUTPATIENT
Start: 2020-03-16 | End: 2020-04-14

## 2020-03-24 DIAGNOSIS — G30.1 LATE ONSET ALZHEIMER'S DISEASE WITHOUT BEHAVIORAL DISTURBANCE (HCC): ICD-10-CM

## 2020-03-24 DIAGNOSIS — F02.80 LATE ONSET ALZHEIMER'S DISEASE WITHOUT BEHAVIORAL DISTURBANCE (HCC): ICD-10-CM

## 2020-03-24 RX ORDER — MEMANTINE HYDROCHLORIDE 10 MG/1
TABLET ORAL
Qty: 180 TAB | Refills: 0 | Status: SHIPPED | OUTPATIENT
Start: 2020-03-24 | End: 2021-01-28 | Stop reason: SDUPTHER

## 2020-04-14 DIAGNOSIS — G10 HEREDITARY CHOREA (HCC): ICD-10-CM

## 2020-04-14 RX ORDER — CLONAZEPAM 0.5 MG/1
TABLET ORAL
Qty: 45 TAB | Refills: 0 | Status: SHIPPED | OUTPATIENT
Start: 2020-04-14 | End: 2020-05-18

## 2020-05-18 DIAGNOSIS — G10 HEREDITARY CHOREA (HCC): ICD-10-CM

## 2020-05-18 RX ORDER — CLONAZEPAM 0.5 MG/1
TABLET ORAL
Qty: 45 TAB | Refills: 0 | Status: SHIPPED | OUTPATIENT
Start: 2020-05-18 | End: 2020-06-23

## 2020-05-26 ENCOUNTER — OFFICE VISIT (OUTPATIENT)
Dept: INTERNAL MEDICINE CLINIC | Age: 85
End: 2020-05-26

## 2020-05-26 VITALS
OXYGEN SATURATION: 94 % | DIASTOLIC BLOOD PRESSURE: 80 MMHG | RESPIRATION RATE: 18 BRPM | TEMPERATURE: 98.1 F | HEART RATE: 87 BPM | HEIGHT: 62 IN | SYSTOLIC BLOOD PRESSURE: 130 MMHG | BODY MASS INDEX: 31.13 KG/M2

## 2020-05-26 DIAGNOSIS — I63.30 CEREBRAL THROMBOSIS WITH CEREBRAL INFARCTION (HCC): ICD-10-CM

## 2020-05-26 DIAGNOSIS — J44.9 CHRONIC OBSTRUCTIVE PULMONARY DISEASE, UNSPECIFIED COPD TYPE (HCC): Primary | ICD-10-CM

## 2020-05-26 DIAGNOSIS — R73.03 PREDIABETES: ICD-10-CM

## 2020-05-26 DIAGNOSIS — G10 HEREDITARY CHOREA (HCC): ICD-10-CM

## 2020-05-26 DIAGNOSIS — E55.9 VITAMIN D DEFICIENCY: ICD-10-CM

## 2020-05-26 DIAGNOSIS — F02.80 LATE ONSET ALZHEIMER'S DISEASE WITHOUT BEHAVIORAL DISTURBANCE (HCC): ICD-10-CM

## 2020-05-26 DIAGNOSIS — G30.1 LATE ONSET ALZHEIMER'S DISEASE WITHOUT BEHAVIORAL DISTURBANCE (HCC): ICD-10-CM

## 2020-05-26 RX ORDER — FLUTICASONE FUROATE AND VILANTEROL TRIFENATATE 100; 25 UG/1; UG/1
1 POWDER RESPIRATORY (INHALATION)
Qty: 1 INHALER | Refills: 3 | Status: SHIPPED | OUTPATIENT
Start: 2020-05-26 | End: 2021-01-28 | Stop reason: SDUPTHER

## 2020-05-26 NOTE — PROGRESS NOTES
HISTORY OF PRESENT ILLNESS  Bartolo Ulloa is a 80 y.o. female. here for follow-up. Has COPD, using Breo Ellipta. Doing well. Need oxygen to call in. She is using it at night. Had stroke in the past.  Gait is okay for now. On baby aspirin. Not on statin. Using power wheelchair. 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 Has dementia. stable. able to tolerate namenda well. Need lab work. Need med refill. Dementia    Her past medical history is significant for COPD. Cholesterol Problem     Neurologic Problem   Primary symptoms include memory loss. Blood sugar problem     COPD         Review of Systems   Constitutional: Positive for fatigue. HENT: Negative. Eyes: Negative. Respiratory: Negative. Cardiovascular: Negative. Gastrointestinal: Negative. Genitourinary: Negative. Musculoskeletal: Negative. Skin: Negative. Neurological: Negative. Endo/Heme/Allergies: Negative. Psychiatric/Behavioral: Positive for memory loss. The patient is nervous/anxious. Physical Exam  Constitutional:       General: She is not in acute distress. Appearance: She is well-developed. Neck:      Musculoskeletal: Normal range of motion and neck supple. Thyroid: No thyromegaly. Trachea: No tracheal deviation. Cardiovascular:      Rate and Rhythm: Normal rate and regular rhythm. Heart sounds: Normal heart sounds. Pulmonary:      Effort: Pulmonary effort is normal. No respiratory distress. Breath sounds: Normal breath sounds. No wheezing. Musculoskeletal:         General: No tenderness. Comments: Trace edema   Psychiatric:         Mood and Affect: Mood normal.         Behavior: Behavior normal.       Follow-up       Dementia      Anxiety         Review of Systems   HENT: Negative. Eyes: Negative. Respiratory: Negative. Cardiovascular: Negative. Gastrointestinal: Negative. Musculoskeletal: Positive for joint pain. Negative for falls.    Skin: Negative. Neurological: Negative. Negative for loss of consciousness. Psychiatric/Behavioral: Positive for memory loss. The patient is nervous/anxious. Physical Exam   Constitutional: She appears well-developed and well-nourished. No distress. Neck: Normal range of motion. Neck supple. No JVD present. No thyromegaly present. Cardiovascular: Normal rate, regular rhythm, normal heart sounds and intact distal pulses. Pulmonary/Chest: Effort normal and breath sounds normal. No respiratory distress. She has no wheezes. She has no rales. Musculoskeletal: She exhibits tenderness. Neurological: Coordination abnormal.   Abnormal movement or arms and legs. Psychiatric: Her behavior is normal. Judgment and thought content normal.         ASSESSMENT and PLAN    Diagnoses and all orders for this visit:    1. Chronic obstructive pulmonary disease, unspecified COPD type ([facility] [address]    Stable. Will call in,  -     fluticasone furoate-vilanteroL (Breo Ellipta) 100-25 mcg/dose inhaler; Take 1 Puff by inhalation daily as needed (SOB). She is oxygen at night. Will order,  -     OTHER; Oxygen concentrator to be used at night for COPD  Is 6 feet walk test was performed and the patient's O2 sat dropped to 88% on room air. 2. Cerebral thrombosis with cerebral infarction ([address]    On aspirin. Not taking a statin. Will check,  -     LDL, DIRECT    3. Late onset Alzheimer's disease without behavioral disturbance ([facility] [address]    Doing well. On Namenda. Will check,  -     METABOLIC PANEL, COMPREHENSIVE    4. Hereditary chorea (HCC)  Controlled. On Klonopin. 5. Prediabetes    Advised to watch carbohydrate. Will check,  -     METABOLIC PANEL, COMPREHENSIVE  -     HEMOGLOBIN A1C WITH EAG    6. Vitamin D deficiency    We will check,  -     VITAMIN D, 25 HYDROXY                Discussed expected course/resolution/complications of diagnosis in detail with patient.    Medication risks/benefits/costs/interactions/alternatives discussed with patient. Pt was given an after visit summary which includes diagnoses, current medications & vitals. Pt expressed understanding with the diagnosis and plan.

## 2020-05-26 NOTE — PROGRESS NOTES
Health Maintenance Due   Topic Date Due    DTaP/Tdap/Td series (1 - Tdap) 10/21/1951    Shingrix Vaccine Age 50> (1 of 2) 10/21/1980    GLAUCOMA SCREENING Q2Y  04/09/2017       Chief Complaint   Patient presents with    Blood sugar problem     Prediabetes    COPD    Dementia       1. Have you been to the ER, urgent care clinic since your last visit? Hospitalized since your last visit? No    2. Have you seen or consulted any other health care providers outside of the [address] [place] [address] since your last visit? Include any pap smears or colon screening. No    3) Do you have an Advance Directive on file? yes    4) Are you interested in receiving information on Advance Directives? NO      Patient is accompanied by  I have received verbal consent from Marika Gomez to discuss any/all medical information while they are present in the room.

## 2020-05-27 LAB
25(OH)D3+25(OH)D2 SERPL-MCNC: 33.1 NG/ML (ref 30–100)
ALBUMIN SERPL-MCNC: 4.1 G/DL (ref 3.6–4.6)
ALBUMIN/GLOB SERPL: 1.6 {RATIO} (ref 1.2–2.2)
ALP SERPL-CCNC: 76 IU/L (ref 39–117)
ALT SERPL-CCNC: 11 IU/L (ref 0–32)
AST SERPL-CCNC: 16 IU/L (ref 0–40)
BILIRUB SERPL-MCNC: 0.4 MG/DL (ref 0–1.2)
BUN SERPL-MCNC: 17 MG/DL (ref 8–27)
BUN/CREAT SERPL: 17 (ref 12–28)
CALCIUM SERPL-MCNC: 9.5 MG/DL (ref 8.7–10.3)
CHLORIDE SERPL-SCNC: 102 MMOL/L (ref 96–106)
CO2 SERPL-SCNC: 25 MMOL/L (ref 20–29)
CREAT SERPL-MCNC: 0.99 MG/DL (ref 0.57–1)
EST. AVERAGE GLUCOSE BLD GHB EST-MCNC: 117 MG/DL
GLOBULIN SER CALC-MCNC: 2.6 G/DL (ref 1.5–4.5)
GLUCOSE SERPL-MCNC: 92 MG/DL (ref 65–99)
HBA1C MFR BLD: 5.7 % (ref 4.8–5.6)
INTERPRETATION: NORMAL
LDLC SERPL DIRECT ASSAY-MCNC: 107 MG/DL (ref 0–99)
POTASSIUM SERPL-SCNC: 4.4 MMOL/L (ref 3.5–5.2)
PROT SERPL-MCNC: 6.7 G/DL (ref 6–8.5)
SODIUM SERPL-SCNC: 145 MMOL/L (ref 134–144)

## 2020-06-04 ENCOUNTER — HOME HEALTH ADMISSION (OUTPATIENT)
Dept: HOME HEALTH SERVICES | Facility: HOME HEALTH | Age: 85
End: 2020-06-04

## 2020-06-04 DIAGNOSIS — G30.1 LATE ONSET ALZHEIMER'S DISEASE WITHOUT BEHAVIORAL DISTURBANCE (HCC): ICD-10-CM

## 2020-06-04 DIAGNOSIS — I63.30 CEREBRAL THROMBOSIS WITH CEREBRAL INFARCTION (HCC): ICD-10-CM

## 2020-06-04 DIAGNOSIS — F02.80 LATE ONSET ALZHEIMER'S DISEASE WITHOUT BEHAVIORAL DISTURBANCE (HCC): ICD-10-CM

## 2020-06-04 DIAGNOSIS — G10 HEREDITARY CHOREA (HCC): ICD-10-CM

## 2020-06-04 DIAGNOSIS — R26.9 GAIT ABNORMALITY: Primary | ICD-10-CM

## 2020-06-04 DIAGNOSIS — Z96.652 STATUS POST LEFT KNEE REPLACEMENT: ICD-10-CM

## 2020-06-04 DIAGNOSIS — J44.9 CHRONIC OBSTRUCTIVE PULMONARY DISEASE, UNSPECIFIED COPD TYPE (HCC): ICD-10-CM

## 2020-06-10 ENCOUNTER — TELEPHONE (OUTPATIENT)
Dept: INTERNAL MEDICINE CLINIC | Age: 85
End: 2020-06-10

## 2020-06-16 ENCOUNTER — OFFICE VISIT (OUTPATIENT)
Dept: INTERNAL MEDICINE CLINIC | Age: 85
End: 2020-06-16

## 2020-06-16 VITALS
DIASTOLIC BLOOD PRESSURE: 84 MMHG | HEART RATE: 76 BPM | TEMPERATURE: 98.2 F | SYSTOLIC BLOOD PRESSURE: 136 MMHG | HEIGHT: 62 IN | OXYGEN SATURATION: 90 % | RESPIRATION RATE: 18 BRPM | BODY MASS INDEX: 31.13 KG/M2

## 2020-06-16 DIAGNOSIS — J44.9 CHRONIC OBSTRUCTIVE PULMONARY DISEASE, UNSPECIFIED COPD TYPE (HCC): ICD-10-CM

## 2020-06-16 DIAGNOSIS — I63.30 CEREBRAL THROMBOSIS WITH CEREBRAL INFARCTION (HCC): ICD-10-CM

## 2020-06-16 DIAGNOSIS — R73.03 PREDIABETES: Primary | ICD-10-CM

## 2020-06-16 DIAGNOSIS — G10 HEREDITARY CHOREA (HCC): ICD-10-CM

## 2020-06-16 DIAGNOSIS — Z20.1 EXPOSURE TO TB: ICD-10-CM

## 2020-06-16 NOTE — PROGRESS NOTES
Health Maintenance Due   Topic Date Due    DTaP/Tdap/Td series (1 - Tdap) 10/21/1951    Shingrix Vaccine Age 50> (1 of 2) 10/21/1980    GLAUCOMA SCREENING Q2Y  04/09/2017       No chief complaint on file. 1. Have you been to the ER, urgent care clinic since your last visit? Hospitalized since your last visit? No    2. Have you seen or consulted any other health care providers outside of the [address] [place] [address] since your last visit? Include any pap smears or colon screening. No    3) Do you have an Advance Directive on file? yes    4) Are you interested in receiving information on Advance Directives? NO      Patient is accompanied by spouse I have received verbal consent from Eladio Wilcox to discuss any/all medical information while they are present in the room.

## 2020-06-16 NOTE — PROGRESS NOTES
HISTORY OF PRESENT ILLNESS  Sinai Moore is a 80 y.o. female. here for follow-up. She is going to be in [facility] assisted living. Form need to be filled out. She will need TB test.  She is quite not ambulatory. Using electric wheelchair to ambulate along with walker. Her  dispenses all her medication. She needs help with bathing and dress change sometimes. She would not need 24-hour care. Has COPD, using Breo Ellipta. Doing well. Need oxygen to call in. She is using it at night. Had stroke in the past.  Gait is okay for now. On baby aspirin. Not on statin. Using power wheelchair. 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 Has dementia. stable. able to tolerate namenda well. Labs done, reviewed with patient. COPD     Dementia    Her past medical history is significant for COPD. Neurologic Problem   Primary symptoms include memory loss. Complete Physical         Review of Systems   Constitutional: Negative. HENT: Negative. Eyes: Negative. Respiratory: Negative. Cardiovascular: Negative. Gastrointestinal: Negative. Genitourinary: Negative. Musculoskeletal: Negative. Skin: Negative. Neurological: Negative. Endo/Heme/Allergies: Negative. Psychiatric/Behavioral: Positive for memory loss. The patient is nervous/anxious. Physical Exam  Constitutional:       General: She is not in acute distress. Appearance: Normal appearance. She is well-developed. She is obese. HENT:      Head: Normocephalic and atraumatic. Right Ear: Tympanic membrane normal.      Left Ear: Tympanic membrane normal.      Mouth/Throat:      Mouth: Mucous membranes are moist.   Eyes:      Extraocular Movements: Extraocular movements intact. Pupils: Pupils are equal, round, and reactive to light. Neck:      Musculoskeletal: Normal range of motion and neck supple. Thyroid: No thyromegaly. Trachea: No tracheal deviation.    Cardiovascular:      Rate and Rhythm: Normal rate and regular rhythm. Pulses: Normal pulses. Heart sounds: Normal heart sounds. Pulmonary:      Effort: Pulmonary effort is normal. No respiratory distress. Breath sounds: Normal breath sounds. No wheezing. Abdominal:      Palpations: Abdomen is soft. Musculoskeletal: Normal range of motion. General: No tenderness. Comments: Trace edema   Skin:     General: Skin is warm. Neurological:      General: No focal deficit present. Mental Status: She is alert and oriented to person, place, and time. Mental status is at baseline. Comments: Tremor controlled   Psychiatric:         Mood and Affect: Mood normal.         Behavior: Behavior normal.         Thought Content: Thought content normal.       Follow-up       Dementia      Anxiety         Review of Systems   HENT: Negative. Eyes: Negative. Respiratory: Negative. Cardiovascular: Negative. Gastrointestinal: Negative. Musculoskeletal: Positive for joint pain. Negative for falls. Skin: Negative. Neurological: Negative. Negative for loss of consciousness. Psychiatric/Behavioral: Positive for memory loss. The patient is nervous/anxious. Physical Exam   Constitutional: She appears well-developed and well-nourished. No distress. Neck: Normal range of motion. Neck supple. No JVD present. No thyromegaly present. Cardiovascular: Normal rate, regular rhythm, normal heart sounds and intact distal pulses. Pulmonary/Chest: Effort normal and breath sounds normal. No respiratory distress. She has no wheezes. She has no rales. Musculoskeletal: She exhibits tenderness. Neurological: Coordination abnormal.   Abnormal movement or arms and legs. Psychiatric: Her behavior is normal. Judgment and thought content normal.         ASSESSMENT and PLAN    Diagnoses and all orders for this visit:    1. Prediabetes    A1c stable. Be on low-carb diet and exercise.     2. Chronic obstructive pulmonary disease, unspecified COPD type ([facility]  Stable. Using [employer]. Using oxygen at night. 3. Cerebral thrombosis with cerebral infarction (HCC)  Use walker and electric wheelchair as needed. On aspirin, not on statin. LDL slightly better. Advised her to be on low-cholesterol diet and exercise. 4. Hereditary chorea ([facility]  Controlled. On Klonopin. 5. Exposure to TB    She is going to live in [facility] assisted living. P POC form filled out. Will order,  -     QUANTIFERON-TB GOLD PLUS                  Discussed expected course/resolution/complications of diagnosis in detail with patient. Medication risks/benefits/costs/interactions/alternatives discussed with patient. Pt was given an after visit summary which includes diagnoses, current medications & vitals. Pt expressed understanding with the diagnosis and plan.

## 2020-06-19 LAB
GAMMA INTERFERON BACKGROUND BLD IA-ACNC: 0.01 IU/ML
M TB IFN-G BLD-IMP: NEGATIVE
M TB IFN-G CD4+ BCKGRND COR BLD-ACNC: 0.01 IU/ML
MITOGEN IGNF BLD-ACNC: 0.65 IU/ML
QUANTIFERON INCUBATION, QF1T: NORMAL
QUANTIFERON TB2 AG: 0.02 IU/ML
SERVICE CMNT-IMP: NORMAL

## 2020-06-22 ENCOUNTER — TELEPHONE (OUTPATIENT)
Dept: INTERNAL MEDICINE CLINIC | Age: 85
End: 2020-06-22

## 2020-06-22 ENCOUNTER — HOME HEALTH ADMISSION (OUTPATIENT)
Dept: HOME HEALTH SERVICES | Facility: HOME HEALTH | Age: 85
End: 2020-06-22
Payer: MEDICARE

## 2020-06-22 DIAGNOSIS — F02.80 LATE ONSET ALZHEIMER'S DISEASE WITHOUT BEHAVIORAL DISTURBANCE (HCC): ICD-10-CM

## 2020-06-22 DIAGNOSIS — R26.9 ABNORMALITY OF GAIT: Primary | ICD-10-CM

## 2020-06-22 DIAGNOSIS — Z96.652 STATUS POST LEFT KNEE REPLACEMENT: ICD-10-CM

## 2020-06-22 DIAGNOSIS — G30.1 LATE ONSET ALZHEIMER'S DISEASE WITHOUT BEHAVIORAL DISTURBANCE (HCC): ICD-10-CM

## 2020-06-22 DIAGNOSIS — I63.30 CEREBRAL THROMBOSIS WITH CEREBRAL INFARCTION (HCC): ICD-10-CM

## 2020-06-22 DIAGNOSIS — G10 HEREDITARY CHOREA (HCC): ICD-10-CM

## 2020-06-22 DIAGNOSIS — J44.9 CHRONIC OBSTRUCTIVE PULMONARY DISEASE, UNSPECIFIED COPD TYPE (HCC): ICD-10-CM

## 2020-06-22 NOTE — TELEPHONE ENCOUNTER
Per verbal order by Dr. Brown Travis, read back for confirmation, called the pt's daughter and advised her that [facility] would be able to see the pt for PT/OT/SN. [facility] is able to open the pt within 24-48 hours. Jean Quiroz voiced thanks and understanding.

## 2020-06-22 NOTE — TELEPHONE ENCOUNTER
----- Message from Manav Screen sent at 6/19/2020 12:09 PM EDT -----  Regarding: Dr. Omalley Mins: [phone]  Patient's  is in the hospital and she needs to get her mother somewhere where her mother can be cared for. Please Alden Barrera, patient's daughter.

## 2020-06-23 ENCOUNTER — TELEPHONE (OUTPATIENT)
Dept: INTERNAL MEDICINE CLINIC | Age: 85
End: 2020-06-23

## 2020-06-23 DIAGNOSIS — G10 HEREDITARY CHOREA (HCC): ICD-10-CM

## 2020-06-23 RX ORDER — CLONAZEPAM 0.5 MG/1
TABLET ORAL
Qty: 45 TAB | Refills: 0 | Status: SHIPPED | OUTPATIENT
Start: 2020-06-23 | End: 2020-07-23 | Stop reason: SDUPTHER

## 2020-06-23 NOTE — TELEPHONE ENCOUNTER
Ayala Wang and after verifying HIPAA discussed the pt's COVID testing. Per Fredo Segura, the pt was tested and her results were negative.  Call placed to [facility] and  left advising her of this negative test.

## 2020-06-23 NOTE — TELEPHONE ENCOUNTER
Shanna from [address] called stating that patient's daughter told her that patient tested positive for covid at [facility]. Before they can see the patient, they need to have a copy of the lab results. They want to know if we can request them. Shanna's number is [phone]

## 2020-06-24 ENCOUNTER — TELEPHONE (OUTPATIENT)
Dept: INTERNAL MEDICINE CLINIC | Age: 85
End: 2020-06-24

## 2020-06-24 NOTE — TELEPHONE ENCOUNTER
Called and spoke with the sales and marketing division of [facility] and requested the pt's COVID-19 testing. They will contact Meredith who will either call the office or just fax the results to [phone].

## 2020-06-24 NOTE — TELEPHONE ENCOUNTER
Called pt's daughter to find out where the pt's [id number] testing was performed and was advised that [facility] performed the testing when they thought that the pt was going to go to their [place].

## 2020-06-25 NOTE — TELEPHONE ENCOUNTER
[address] and spoke with Keith Stratton ay [phone], and after verifying HIPAA advised that the results of her COVID test are needed Per Keith Stratton the test just went out yesterday. Advised that the pt's daughter stated that the pt was tested a few days back, but [employer] never came to  the specimen at that time. She states that [employer] picked up the specimen yesterday and it will take a few days to result.

## 2020-06-25 NOTE — TELEPHONE ENCOUNTER
Shanna and advised her of the situation. Advised that the pt was seen in the office by Dr. Guy Hamilton and was afebrile and answered no to all screening questions. Per Lucina Hernandez they will open the pt on Sunday.

## 2020-06-28 ENCOUNTER — HOME CARE VISIT (OUTPATIENT)
Dept: SCHEDULING | Facility: HOME HEALTH | Age: 85
End: 2020-06-28

## 2020-06-29 ENCOUNTER — TELEPHONE (OUTPATIENT)
Dept: INTERNAL MEDICINE CLINIC | Age: 85
End: 2020-06-29

## 2020-06-29 NOTE — TELEPHONE ENCOUNTER
----- Message from Itz Bueno sent at 6/26/2020  4:13 PM EDT -----  Regarding: DR WYNN / [id number] [facility] Message/Vendor Calls        [address] daughter is requesting an order for a new oxygen machine. Due to the current machine not working at all.       Callback required    Best contact number(s): [phone]          Itz Bueno

## 2020-07-14 ENCOUNTER — HOME CARE VISIT (OUTPATIENT)
Dept: HOME HEALTH SERVICES | Facility: HOME HEALTH | Age: 85
End: 2020-07-14

## 2020-07-14 ENCOUNTER — TELEPHONE (OUTPATIENT)
Dept: INTERNAL MEDICINE CLINIC | Age: 85
End: 2020-07-14

## 2020-07-14 NOTE — TELEPHONE ENCOUNTER
Call placed to [place] with [employer] and per verbal order from provider, voiced understanding

## 2020-07-15 ENCOUNTER — HOME CARE VISIT (OUTPATIENT)
Dept: SCHEDULING | Facility: HOME HEALTH | Age: 85
End: 2020-07-15
Payer: MEDICARE

## 2020-07-15 VITALS
SYSTOLIC BLOOD PRESSURE: 142 MMHG | DIASTOLIC BLOOD PRESSURE: 70 MMHG | RESPIRATION RATE: 16 BRPM | OXYGEN SATURATION: 98 % | HEART RATE: 72 BPM | TEMPERATURE: 98.1 F

## 2020-07-15 PROCEDURE — G0151 HHCP-SERV OF PT,EA 15 MIN: HCPCS

## 2020-07-15 PROCEDURE — 400013 HH SOC

## 2020-07-17 ENCOUNTER — HOME CARE VISIT (OUTPATIENT)
Dept: SCHEDULING | Facility: HOME HEALTH | Age: 85
End: 2020-07-17
Payer: MEDICARE

## 2020-07-17 PROCEDURE — G0151 HHCP-SERV OF PT,EA 15 MIN: HCPCS

## 2020-07-18 VITALS
HEART RATE: 74 BPM | RESPIRATION RATE: 16 BRPM | SYSTOLIC BLOOD PRESSURE: 128 MMHG | TEMPERATURE: 97.8 F | OXYGEN SATURATION: 94 % | DIASTOLIC BLOOD PRESSURE: 70 MMHG

## 2020-07-21 ENCOUNTER — HOSPITAL ENCOUNTER (EMERGENCY)
Age: 85
Discharge: HOME | End: 2020-07-21
Attending: STUDENT IN AN ORGANIZED HEALTH CARE EDUCATION/TRAINING PROGRAM
Payer: MEDICARE

## 2020-07-21 ENCOUNTER — HOME CARE VISIT (OUTPATIENT)
Dept: SCHEDULING | Facility: HOME HEALTH | Age: 85
End: 2020-07-21
Payer: MEDICARE

## 2020-07-21 ENCOUNTER — APPOINTMENT (OUTPATIENT)
Dept: GENERAL RADIOLOGY | Age: 85
End: 2020-07-21
Attending: STUDENT IN AN ORGANIZED HEALTH CARE EDUCATION/TRAINING PROGRAM
Payer: MEDICARE

## 2020-07-21 ENCOUNTER — APPOINTMENT (OUTPATIENT)
Dept: CT IMAGING | Age: 85
End: 2020-07-21
Attending: STUDENT IN AN ORGANIZED HEALTH CARE EDUCATION/TRAINING PROGRAM
Payer: MEDICARE

## 2020-07-21 VITALS
DIASTOLIC BLOOD PRESSURE: 70 MMHG | OXYGEN SATURATION: 98 % | SYSTOLIC BLOOD PRESSURE: 128 MMHG | HEART RATE: 72 BPM | RESPIRATION RATE: 16 BRPM | TEMPERATURE: 98.2 F

## 2020-07-21 VITALS
OXYGEN SATURATION: 91 % | BODY MASS INDEX: 32.66 KG/M2 | HEART RATE: 90 BPM | TEMPERATURE: 98.5 F | SYSTOLIC BLOOD PRESSURE: 174 MMHG | WEIGHT: 178.57 LBS | DIASTOLIC BLOOD PRESSURE: 80 MMHG | RESPIRATION RATE: 16 BRPM

## 2020-07-21 DIAGNOSIS — S00.03XA HEMATOMA OF SCALP, INITIAL ENCOUNTER: Primary | ICD-10-CM

## 2020-07-21 DIAGNOSIS — S09.90XA CLOSED HEAD INJURY, INITIAL ENCOUNTER: ICD-10-CM

## 2020-07-21 PROCEDURE — 74011250637 HC RX REV CODE- 250/637: Performed by: STUDENT IN AN ORGANIZED HEALTH CARE EDUCATION/TRAINING PROGRAM

## 2020-07-21 PROCEDURE — G0151 HHCP-SERV OF PT,EA 15 MIN: HCPCS

## 2020-07-21 PROCEDURE — 70450 CT HEAD/BRAIN W/O DYE: CPT

## 2020-07-21 PROCEDURE — G0299 HHS/HOSPICE OF RN EA 15 MIN: HCPCS

## 2020-07-21 PROCEDURE — 71046 X-RAY EXAM CHEST 2 VIEWS: CPT

## 2020-07-21 PROCEDURE — 99285 EMERGENCY DEPT VISIT HI MDM: CPT

## 2020-07-21 RX ORDER — ACETAMINOPHEN 325 MG/1
650 TABLET ORAL ONCE
Status: COMPLETED | OUTPATIENT
Start: 2020-07-21 | End: 2020-07-21

## 2020-07-21 RX ADMIN — ACETAMINOPHEN 650 MG: 325 TABLET ORAL at 04:24

## 2020-07-21 NOTE — ED PROVIDER NOTES
79-year-old woman with history of COPD on nocturnal O2, stroke, late onset Alzheimer dementia presenting after a fall. She rolled out of bed at her assisted living facility, struck left side of her head. Complaining of pain over the left head with palpation. No headache without palpation, vomiting, neck pain, chest or abdominal discomfort, dyspnea. No fevers or chills. No anticoagulant use, 1 antiplatelet medication - aspirin.            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,TIA    Unspecified sleep apnea     NO CPAP       Past Surgical History:   Procedure Laterality Date    ABDOMEN SURGERY PROC UNLISTED      hernia repair    HX GYN  1963    c section    HX HEENT      status post T&A    HX OOPHORECTOMY      HX ORTHOPAEDIC      status post left total knee replacement         Family History:   Problem Relation Age of Onset    Hypertension Maternal Grandmother     Hypertension Maternal Grandfather     Hypertension Paternal Grandmother     Hypertension Paternal Grandfather     MS Daughter     No Known Problems Mother     No Known Problems Father        Social History     Socioeconomic History    Marital status:      Spouse name: Not on file    Number of children: Not on file    Years of education: Not on file    Highest education level: Not on file   Occupational History    Not on file   Social Needs    Financial resource strain: Not on file    Food insecurity     Worry: Not on file     Inability: Not on file    Transportation needs     Medical: Not on file     Non-medical: Not on file   Tobacco Use    Smoking status: Former Smoker     Packs/day: 1.00     Years: 40.00     Pack years: 40.00     Last attempt to quit: 6/16/1982     Years since quittin.1    Smokeless tobacco: Never Used   Substance and Sexual Activity    Alcohol use: No    Drug use: No    Sexual activity: Yes     Partners: Male     Comment: ,2 daughters. retired. Lifestyle    Physical activity     Days per week: Not on file     Minutes per session: Not on file    Stress: Not on file   Relationships    Social connections     Talks on phone: Not on file     Gets together: Not on file     Attends Cheondoism service: Not on file     Active member of club or organization: Not on file     Attends meetings of clubs or organizations: Not on file     Relationship status: Not on file    Intimate partner violence     Fear of current or ex partner: Not on file     Emotionally abused: Not on file     Physically abused: Not on file     Forced sexual activity: Not on file   Other Topics Concern    Not on file   Social History Narrative    Moved to [place], [place] 20 years ago for 10 years----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 Eyes: Negative for photophobia. Respiratory: Negative for cough and shortness of breath. Cardiovascular: Negative for chest pain. Gastrointestinal: Negative for abdominal pain. Genitourinary: Negative for dysuria. Musculoskeletal: Positive for arthralgias. Negative for back pain. Neurological: Negative for headaches. Psychiatric/Behavioral: Negative for confusion. All other systems reviewed and are negative. Vitals:    20 0406   BP: (!) 150/92   Pulse: 90   Resp: 16   Temp: 98.5 °F (36.9 °C)   SpO2: 92%   Weight: 81 kg (178 lb 9.2 oz)            Physical Exam  Vitals signs and nursing note reviewed. Constitutional:       General: She is not in acute distress. Appearance: She is well-developed. HENT:      Head: Normocephalic. Mouth/Throat:      Mouth: Mucous membranes are moist.      Pharynx: Oropharynx is clear.  No oropharyngeal exudate. Eyes:      Pupils: Pupils are equal, round, and reactive to light. Cardiovascular:      Rate and Rhythm: Normal rate and regular rhythm. Heart sounds: Normal heart sounds. Pulmonary:      Effort: Pulmonary effort is normal.      Breath sounds: Normal breath sounds. Chest:      Chest wall: No tenderness. Abdominal:      General: There is no distension. Palpations: Abdomen is soft. Tenderness: There is no abdominal tenderness. There is no guarding or rebound. Musculoskeletal: Normal range of motion. General: No deformity. Right lower leg: No edema. Left lower leg: No edema. Comments: Entire spine palpated, no tenderness or deformity. Skin:     General: Skin is warm and dry. Capillary Refill: Capillary refill takes less than 2 seconds. Neurological:      General: No focal deficit present. Mental Status: She is alert and oriented to person, place, and time. Psychiatric:         Mood and Affect: Mood normal.          MDM     Mechanical fall from rolling out of bed in her sleep   Denies recent symtpoms of illness   Though there is a history of dementia she is oriented to day of the week and location, person, and is able to contribute significantly to the HPI   Low normal o2 sat -- on baseline nocturnal o2, cxr reassuring   Advised follow up with primary care for re-evaluation.           Procedures

## 2020-07-21 NOTE — ED NOTES
Report given to transport team, patient off the floor in NAD. Patient  given copy of dc instructions. Patient verbalized understanding of instructions. Patient alert and oriented and in no acute distress.

## 2020-07-21 NOTE — ED NOTES
MD to bedside to update patient on results.       LALITHA beckwith 08:15am, will attempt to call other transport services for faster d/c

## 2020-07-21 NOTE — ED NOTES
Placed on 2L NC for desat to 87% while sleeping, hx of sleep apnea/copd, now 94% on [facility]

## 2020-07-21 NOTE — ROUTINE PROCESS
TRANSFER - OUT REPORT:    Verbal report given to Kaci alcaraz(name) on Brant Wong  being transferred home to [facility]. Report consisted of patients Situation, Background, Assessment and   Recommendations(SBAR). Information from the following report(s) SBAR, MAR and Recent Results was reviewed with the receiving nurse. Opportunity for questions and clarification was provided.

## 2020-07-22 ENCOUNTER — HOME CARE VISIT (OUTPATIENT)
Dept: HOME HEALTH SERVICES | Facility: HOME HEALTH | Age: 85
End: 2020-07-22
Payer: MEDICARE

## 2020-07-22 ENCOUNTER — TELEPHONE (OUTPATIENT)
Dept: INTERNAL MEDICINE CLINIC | Age: 85
End: 2020-07-22

## 2020-07-23 ENCOUNTER — HOME CARE VISIT (OUTPATIENT)
Dept: SCHEDULING | Facility: HOME HEALTH | Age: 85
End: 2020-07-23
Payer: MEDICARE

## 2020-07-23 VITALS — TEMPERATURE: 97.8 F | HEART RATE: 72 BPM | OXYGEN SATURATION: 90 % | RESPIRATION RATE: 16 BRPM

## 2020-07-23 DIAGNOSIS — G10 HEREDITARY CHOREA (HCC): ICD-10-CM

## 2020-07-23 PROCEDURE — G0151 HHCP-SERV OF PT,EA 15 MIN: HCPCS

## 2020-07-23 NOTE — TELEPHONE ENCOUNTER
Requested Prescriptions     Pending Prescriptions Disp Refills    clonazePAM (KlonoPIN) 0.5 mg tablet 45 Tab 0     Patient has three pills left.  06/16/2020 09/23/2020  [facility]

## 2020-07-24 ENCOUNTER — TELEPHONE (OUTPATIENT)
Dept: INTERNAL MEDICINE CLINIC | Age: 85
End: 2020-07-24

## 2020-07-24 ENCOUNTER — HOME CARE VISIT (OUTPATIENT)
Dept: HOME HEALTH SERVICES | Facility: HOME HEALTH | Age: 85
End: 2020-07-24
Payer: MEDICARE

## 2020-07-24 ENCOUNTER — HOME CARE VISIT (OUTPATIENT)
Dept: SCHEDULING | Facility: HOME HEALTH | Age: 85
End: 2020-07-24
Payer: MEDICARE

## 2020-07-24 VITALS — HEART RATE: 81 BPM | TEMPERATURE: 96.8 F | OXYGEN SATURATION: 86 %

## 2020-07-24 PROCEDURE — G0152 HHCP-SERV OF OT,EA 15 MIN: HCPCS

## 2020-07-24 NOTE — TELEPHONE ENCOUNTER
Returned Venus's call and after verifying HIPAA was advised that the pt does not have a concentrator. Call placed to [facility] with Zoe Majeste/[facility] and message left asking for a return call.

## 2020-07-24 NOTE — TELEPHONE ENCOUNTER
Alvino Holden to clarify the request. Per Alvino Holden the pt had a portable O2 concentrator that was over 11years old and is finally broken. Advised that an O2 eval needs to be performed for another O2 order to be placed. VV appt made for Tuesday 7/28/20 @ 11:30 A. M.

## 2020-07-24 NOTE — TELEPHONE ENCOUNTER
Florencia Salazar states pt is drowsy, and oxygen level are high 80, then checked again it was 91.   Pls call to discuss

## 2020-07-27 RX ORDER — CLONAZEPAM 0.5 MG/1
TABLET ORAL
Qty: 45 TAB | Refills: 0 | Status: SHIPPED | OUTPATIENT
Start: 2020-07-27 | End: 2020-08-26 | Stop reason: SDUPTHER

## 2020-07-28 ENCOUNTER — HOME CARE VISIT (OUTPATIENT)
Dept: SCHEDULING | Facility: HOME HEALTH | Age: 85
End: 2020-07-28
Payer: MEDICARE

## 2020-07-28 ENCOUNTER — VIRTUAL VISIT (OUTPATIENT)
Dept: INTERNAL MEDICINE CLINIC | Age: 85
End: 2020-07-28

## 2020-07-28 DIAGNOSIS — Z71.89 ACP (ADVANCE CARE PLANNING): ICD-10-CM

## 2020-07-28 DIAGNOSIS — G30.1 LATE ONSET ALZHEIMER'S DISEASE WITHOUT BEHAVIORAL DISTURBANCE (HCC): ICD-10-CM

## 2020-07-28 DIAGNOSIS — Z00.00 MEDICARE ANNUAL WELLNESS VISIT, SUBSEQUENT: ICD-10-CM

## 2020-07-28 DIAGNOSIS — M17.0 PRIMARY OSTEOARTHRITIS OF BOTH KNEES: ICD-10-CM

## 2020-07-28 DIAGNOSIS — J44.9 CHRONIC OBSTRUCTIVE PULMONARY DISEASE, UNSPECIFIED COPD TYPE (HCC): Primary | ICD-10-CM

## 2020-07-28 DIAGNOSIS — F02.80 LATE ONSET ALZHEIMER'S DISEASE WITHOUT BEHAVIORAL DISTURBANCE (HCC): ICD-10-CM

## 2020-07-28 PROCEDURE — G0151 HHCP-SERV OF PT,EA 15 MIN: HCPCS

## 2020-07-28 RX ORDER — DICLOFENAC SODIUM 10 MG/G
GEL TOPICAL
Qty: 100 G | Refills: 2 | Status: SHIPPED | OUTPATIENT
Start: 2020-07-28 | End: 2020-08-14 | Stop reason: SDUPTHER

## 2020-07-28 NOTE — PROGRESS NOTES
This is the Subsequent Medicare Annual Wellness Exam, performed 12 months or more after the Initial AWV or the last Subsequent AWV    I have reviewed the patient's medical history in detail and updated the computerized patient record. History     Patient Active Problem List   Diagnosis Code    Cerebral thrombosis with cerebral infarction ([facility] [address] I63.30    COPD (chronic obstructive pulmonary disease) ([facility]) J44.9    Chronic respiratory failure with hypoxia ([facility]) J96.11    Sinus tachycardia R00.0    S/P knee replacement Z96.659    Obstructive sleep apnea G47.33    Dyslipidemia E78.5    Debility R53.81    Nausea and vomiting R11.2    UTI (urinary tract infection) N39.0    Hereditary chorea ([facility] [address] G10    Late onset Alzheimer's disease without behavioral disturbance ([facility]) G30.1, F02.80     Past Medical History:   Diagnosis Date    Arthritis     OSTEO    COPD     GERD (gastroesophageal reflux disease)     HX OTHER MEDICAL     hernia repair 39 yrs ago    On home oxygen therapy     2LPM AT NIGHT    Stroke ([facility] [address] 2012    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 45 Tab 0    fluticasone furoate-vilanteroL (Breo Ellipta) 100-25 mcg/dose inhaler Take 1 Puff by inhalation daily as needed (SOB).  1 Inhaler 3    OTHER Oxygen concentrator to be used at night for COPD 1 Units 0    memantine (NAMENDA) 10 mg tablet TAKE ONE TABLET BY MOUTH 2 TIMES A  Tab 0    azelastine (OPTIVAR) 0.05 % ophthalmic solution Administer 1 Drop to right eye two (2) times daily as needed (allergy). Use in affected eye(s) 6 mL 1    acetaminophen (TYLENOL) 500 mg tablet Take 1 Tab by mouth two (2) times daily as needed for Pain. 60 Tab 3    aspirin delayed-release (ECOTRIN LOW STRENGTH) 81 mg tablet Take 1 Tab by mouth daily. 30 Tab 12    calcium citrate-vitamin d3 (CITRACAL+D) 315-200 mg-unit tab Take 1 Tab by mouth daily (with breakfast). (Patient taking differently: Take 0.5 Tabs by mouth two (2) times daily (with meals). ) 30 Tab 12     Allergies   Allergen Reactions    Keflex [Cephalexin] Rash       Family History   Problem Relation Age of Onset    Hypertension Maternal Grandmother     Hypertension Maternal Grandfather     Hypertension Paternal Grandmother     Hypertension Paternal Grandfather     MS Daughter     No Known Problems Mother     No Known Problems Father      Social History     Tobacco Use    Smoking status: Former Smoker     Packs/day: 1.00     Years: 40.00     Pack years: 40.00     Last attempt to quit: 1982     Years since quittin.1    Smokeless tobacco: Never Used   Substance Use Topics    Alcohol use: No       Depression Risk Factor Screening:     3 most recent PHQ Screens 2020   Little interest or pleasure in doing things Not at all   Feeling down, depressed, irritable, or hopeless Not at all   Total Score PHQ 2 0       Alcohol Risk Factor Screening:   Do you average 1 drink per night or more than 7 drinks a week:  No    On any one occasion in the past three months have you have had more than 3 drinks containing alcohol:  No      Functional Ability and Level of Safety:   Hearing: Hearing is good. Activities of Daily Living: The home contains: rollator  Patient needs help with:  transportation, shopping, housework, managing money, bathing and walking     Ambulation: with mild difficulty     Fall Risk:  Fall Risk Assessment, last 12 mths 2020   Able to walk? Yes   Fall in past 12 months? Yes   Fall with injury?  Yes   Number of falls in past 12 months 1   Fall Risk Score 2     Abuse Screen:  Patient is not abused       Cognitive Screening   Has your family/caregiver stated any concerns about your memory: no    Cognitive Screening: demented    Patient Care Team   Patient Care Team:  Lorri Simmons MD as PCP - General (Internal Medicine)  Lorri Simmons MD as PCP - [facility] Provider    Assessment/Plan   Education and counseling provided:  Are appropriate based on today's review and evaluation  End-of-Life planning (with patient's consent)  Bone mass measurement (DEXA)  Screening for glaucoma        Health Maintenance Due   Topic Date Due    DTaP/Tdap/Td series (1 - Tdap) 10[birth year]/1951    Shingrix Vaccine Age 50> (1 of 2) 10/21/1980    GLAUCOMA SCREENING Q2Y  04/09/2017       Renetta Bishop, who was evaluated through a synchronous (real-time) audio-video encounter, and/or her healthcare decision maker, is aware that it is a billable service, with coverage as determined by her insurance carrier. She provided verbal consent to proceed: Yes, and patient identification was verified. It was conducted pursuant to the emergency declaration under the [address] [place] [address], [address] [place] [address] authority and the [employer] and [employer] General Act. A caregiver was present when appropriate. Ability to conduct physical exam was limited. I was in the office. The patient was at home.     Joan Lazaro MD

## 2020-07-28 NOTE — PATIENT INSTRUCTIONS
Medicare Wellness Visit, Female The best way to live healthy is to have a lifestyle where you eat a well-balanced diet, exercise regularly, limit alcohol use, and quit all forms of tobacco/nicotine, if applicable. Regular preventive services are another way to keep healthy. Preventive services (vaccines, screening tests, monitoring & exams) can help personalize your care plan, which helps you manage your own care. Screening tests can find health problems at the earliest stages, when they are easiest to treat. [facility] follows the current, evidence-based guidelines published by the [facility] Kiran Hoffman ([facility]) when recommending preventive services for our patients. Because we follow these guidelines, sometimes recommendations change over time as research supports it. (For example, mammograms used to be recommended annually. Even though Medicare will still pay for an annual mammogram, the newer guidelines recommend a mammogram every two years for women of average risk). Of course, you and your doctor may decide to screen more often for some diseases, based on your risk and your co-morbidities (chronic disease you are already diagnosed with). Preventive services for you include: - Medicare offers their members a free annual wellness visit, which is time for you and your primary care provider to discuss and plan for your preventive service needs. Take advantage of this benefit every year! 
-All adults over the age of 72 should receive the recommended pneumonia vaccines. Current USPSTF guidelines recommend a series of two vaccines for the best pneumonia protection.  
-All adults should have a flu vaccine yearly and a tetanus vaccine every 10 years.  
-All adults age 48 and older should receive the shingles vaccines (series of two vaccines). -All adults age 38-68 who are overweight should have a diabetes screening test once every three years. -All adults born between [birth year] and 1965 should be screened once for Hepatitis C. 
-Other screening tests and preventive services for persons with diabetes include: an eye exam to screen for diabetic retinopathy, a kidney function test, a foot exam, and stricter control over your cholesterol.  
-Cardiovascular screening for adults with routine risk involves an electrocardiogram (ECG) at intervals determined by your doctor.  
-Colorectal cancer screenings should be done for adults age 54-65 with no increased risk factors for colorectal cancer. There are a number of acceptable methods of screening for this type of cancer. Each test has its own benefits and drawbacks. Discuss with your doctor what is most appropriate for you during your annual wellness visit. The different tests include: colonoscopy (considered the best screening method), a fecal occult blood test, a fecal DNA test, and sigmoidoscopy. 
 
-A bone mass density test is recommended when a woman turns 65 to screen for osteoporosis. This test is only recommended one time, as a screening. Some providers will use this same test as a disease monitoring tool if you already have osteoporosis. -Breast cancer screenings are recommended every other year for women of normal risk, age 54-69. 
-Cervical cancer screenings for women over age 72 are only recommended with certain risk factors. Here is a list of your current Health Maintenance items (your personalized list of preventive services) with a due date: 
Health Maintenance Due Topic Date Due  
 DTaP/Tdap/Td  (1 - Tdap) 10/21/1951  Shingles Vaccine (1 of 2) 10/21/1980  Glaucoma Screening   04/09/2017

## 2020-07-28 NOTE — PROGRESS NOTES
France Herr is a 80 y.o. female who was seen by synchronous (real-time) audio-video technology on 7/28/2020 for O2/Oxygen (face to face)        Assessment & Plan:   Diagnoses and all orders for this visit:    1. Chronic obstructive pulmonary disease, unspecified COPD type ([facility] [address]    She gets more shortness of breath lately. Patient lives in assisted living. Nurse placed pulse ox on patient's finger. After well after 1 minute she held it up to the camera and need to read 80% at rest on room air. 2. Late onset Alzheimer's disease without behavioral disturbance ([facility] [address]  Doing well. On Namenda. 3. Medicare annual wellness visit, subsequent    4. ACP (advance care planning)    5. Primary osteoarthritis of both knees  Taking Tylenol as needed. Will add,    -     diclofenac (VOLTAREN) 1 % gel; Apply  to affected area two (2) times daily as needed for Pain. I spent at least 25 minutes on this visit with this established patient. Subjective:   Ms. Mila Yanez is staying in [facility] assisted living with her . Reports increased shortness of breath lately. She has history of COPD. She is not using oxygen at night. Need testing for, overnight pulse oximetry. Report any arthritis. Taking Tylenol, not helping much. Walking with Rollator. Roney seems under control. Using Klonopin. She is demented, using medications. Seems stable. Here for Medicare wellness visit. Making living will. Daughter will be her POA. Prior to Admission medications    Medication Sig Start Date End Date Taking? Authorizing Provider   diclofenac (VOLTAREN) 1 % gel Apply  to affected area two (2) times daily as needed for Pain. 7/28/20  Yes Nina Ortez MD   clonazePAM (KlonoPIN) 0.5 mg tablet Half tablet in the morning and 1 tablet at night. 7/27/20  Yes Nina Ortez MD   fluticasone furoate-vilanteroL (Breo Ellipta) 100-25 mcg/dose inhaler Take 1 Puff by inhalation daily as needed (SOB).  5/26/20  Yes Nina rOtez MD   OTHER Oxygen concentrator to be used at night for COPD 5/26/20  Yes Latrell Strong MD   memantine [facility]) 10 mg tablet TAKE ONE TABLET BY MOUTH 2 TIMES A DAY 3/24/20  Yes Latrell Strong MD   azelastine (OPTIVAR) 0.05 % ophthalmic solution Administer 1 Drop to right eye two (2) times daily as needed (allergy). Use in affected eye(s) 5/28/19  Yes Latrell Strong MD   acetaminophen (TYLENOL) 500 mg tablet Take 1 Tab by mouth two (2) times daily as needed for Pain. 10/17/17  Yes Latrell Strong MD   aspirin delayed-release (ECOTRIN LOW STRENGTH) 81 mg tablet Take 1 Tab by mouth daily. 1/17/17  Yes Latrell Strong MD   calcium citrate-vitamin d3 (CITRACAL+D) 315-200 mg-unit tab Take 1 Tab by mouth daily (with breakfast). Patient taking differently: Take 0.5 Tabs by mouth two (2) times daily (with meals). 11/22/16  Yes Latrell Strong MD     Past Medical History:   Diagnosis Date    Arthritis     OSTEO    COPD     GERD (gastroesophageal reflux disease)     HX OTHER MEDICAL     hernia repair 39 yrs ago    On home oxygen therapy     2LPM AT NIGHT    Stroke 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     Objective:     Patient-Reported Vitals 7/28/2020   Patient-Reported Height 5f2          Constitutional: [x] Appears well-developed and well-nourished [x] No apparent distress      [] Abnormal -     Mental status: [x] Alert and awake  [x] Oriented to person/place/time [x] Able to follow commands    [] Abnormal -       HENT: [x] Normocephalic, atraumatic  [] Abnormal -   [x] Mouth/Throat: Mucous membranes are moist    External Ears [x] Normal  [] Abnormal -    Neck: [x] No visualized mass [] Abnormal -     Pulmonary/Chest: [x] Respiratory effort normal   [x] No visualized signs of difficulty breathing or respiratory distress        [] Abnormal -      Musculoskeletal:   [x] Normal gait with no signs of ataxia         [x] Normal range of motion of neck        [] Abnormal -     Neurological:        [x] No Facial Asymmetry (Cranial nerve 7 motor function) (limited exam due to video visit)          [x] No gaze palsy        [] Abnormal -          Skin:        [x] No significant exanthematous lesions or discoloration noted on facial skin         [] Abnormal -            Psychiatric:       [x] Normal Affect [] Abnormal -        [x] No Hallucinations    Other pertinent observable physical exam findings:-        We discussed the expected course, resolution and complications of the diagnosis(es) in detail. Medication risks, benefits, costs, interactions, and alternatives were discussed as indicated. I advised her to contact the office if her condition worsens, changes or fails to improve as anticipated. She expressed understanding with the diagnosis(es) and plan. Mai Montes, who was evaluated through a patient-initiated, synchronous (real-time) audio-video encounter, and/or her healthcare decision maker, is aware that it is a billable service, with coverage as determined by her insurance carrier. She provided verbal consent to proceed: Yes, and patient identification was verified. It was conducted pursuant to the emergency declaration under the [facility], [address] [place] [address] authority and the [employer] and [employer] Act. A caregiver was present when appropriate. Ability to conduct physical exam was limited. I was in the office. The patient was at home.       Faith Duong MD

## 2020-07-28 NOTE — ACP (ADVANCE CARE PLANNING)

## 2020-07-29 ENCOUNTER — HOME CARE VISIT (OUTPATIENT)
Dept: HOME HEALTH SERVICES | Facility: HOME HEALTH | Age: 85
End: 2020-07-29
Payer: MEDICARE

## 2020-07-29 ENCOUNTER — HOME CARE VISIT (OUTPATIENT)
Dept: SCHEDULING | Facility: HOME HEALTH | Age: 85
End: 2020-07-29
Payer: MEDICARE

## 2020-07-29 VITALS
OXYGEN SATURATION: 90 % | SYSTOLIC BLOOD PRESSURE: 142 MMHG | TEMPERATURE: 98.9 F | DIASTOLIC BLOOD PRESSURE: 70 MMHG | HEART RATE: 72 BPM | RESPIRATION RATE: 16 BRPM

## 2020-07-29 PROCEDURE — G0152 HHCP-SERV OF OT,EA 15 MIN: HCPCS

## 2020-07-30 ENCOUNTER — HOME CARE VISIT (OUTPATIENT)
Dept: SCHEDULING | Facility: HOME HEALTH | Age: 85
End: 2020-07-30
Payer: MEDICARE

## 2020-07-30 ENCOUNTER — HOME CARE VISIT (OUTPATIENT)
Dept: HOME HEALTH SERVICES | Facility: HOME HEALTH | Age: 85
End: 2020-07-30
Payer: MEDICARE

## 2020-07-30 VITALS — OXYGEN SATURATION: 88 % | DIASTOLIC BLOOD PRESSURE: 70 MMHG | TEMPERATURE: 96.8 F | SYSTOLIC BLOOD PRESSURE: 120 MMHG

## 2020-07-30 DIAGNOSIS — G10 HEREDITARY CHOREA (HCC): ICD-10-CM

## 2020-07-30 PROCEDURE — G0151 HHCP-SERV OF PT,EA 15 MIN: HCPCS

## 2020-07-31 ENCOUNTER — HOME CARE VISIT (OUTPATIENT)
Dept: HOME HEALTH SERVICES | Facility: HOME HEALTH | Age: 85
End: 2020-07-31
Payer: MEDICARE

## 2020-07-31 ENCOUNTER — HOME CARE VISIT (OUTPATIENT)
Dept: SCHEDULING | Facility: HOME HEALTH | Age: 85
End: 2020-07-31
Payer: MEDICARE

## 2020-07-31 VITALS
OXYGEN SATURATION: 90 % | DIASTOLIC BLOOD PRESSURE: 70 MMHG | HEART RATE: 78 BPM | SYSTOLIC BLOOD PRESSURE: 145 MMHG | RESPIRATION RATE: 16 BRPM | TEMPERATURE: 97.7 F

## 2020-07-31 PROCEDURE — G0152 HHCP-SERV OF OT,EA 15 MIN: HCPCS

## 2020-08-01 VITALS
DIASTOLIC BLOOD PRESSURE: 80 MMHG | SYSTOLIC BLOOD PRESSURE: 140 MMHG | OXYGEN SATURATION: 78 % | TEMPERATURE: 96.8 F | HEART RATE: 95 BPM

## 2020-08-03 ENCOUNTER — HOME CARE VISIT (OUTPATIENT)
Dept: HOME HEALTH SERVICES | Facility: HOME HEALTH | Age: 85
End: 2020-08-03
Payer: MEDICARE

## 2020-08-03 ENCOUNTER — HOME CARE VISIT (OUTPATIENT)
Dept: SCHEDULING | Facility: HOME HEALTH | Age: 85
End: 2020-08-03
Payer: MEDICARE

## 2020-08-03 PROCEDURE — G0152 HHCP-SERV OF OT,EA 15 MIN: HCPCS

## 2020-08-04 ENCOUNTER — HOME CARE VISIT (OUTPATIENT)
Dept: SCHEDULING | Facility: HOME HEALTH | Age: 85
End: 2020-08-04
Payer: MEDICARE

## 2020-08-04 ENCOUNTER — HOME CARE VISIT (OUTPATIENT)
Dept: HOME HEALTH SERVICES | Facility: HOME HEALTH | Age: 85
End: 2020-08-04
Payer: MEDICARE

## 2020-08-04 PROCEDURE — G0151 HHCP-SERV OF PT,EA 15 MIN: HCPCS

## 2020-08-05 VITALS
HEART RATE: 78 BPM | SYSTOLIC BLOOD PRESSURE: 120 MMHG | DIASTOLIC BLOOD PRESSURE: 60 MMHG | OXYGEN SATURATION: 90 % | TEMPERATURE: 97 F

## 2020-08-05 VITALS
TEMPERATURE: 97.8 F | OXYGEN SATURATION: 92 % | HEART RATE: 71 BPM | RESPIRATION RATE: 16 BRPM | SYSTOLIC BLOOD PRESSURE: 130 MMHG | DIASTOLIC BLOOD PRESSURE: 62 MMHG

## 2020-08-06 ENCOUNTER — HOME CARE VISIT (OUTPATIENT)
Dept: SCHEDULING | Facility: HOME HEALTH | Age: 85
End: 2020-08-06
Payer: MEDICARE

## 2020-08-06 VITALS — TEMPERATURE: 98 F | RESPIRATION RATE: 16 BRPM | OXYGEN SATURATION: 92 % | HEART RATE: 78 BPM

## 2020-08-06 PROCEDURE — G0151 HHCP-SERV OF PT,EA 15 MIN: HCPCS

## 2020-08-07 ENCOUNTER — HOME CARE VISIT (OUTPATIENT)
Dept: HOME HEALTH SERVICES | Facility: HOME HEALTH | Age: 85
End: 2020-08-07
Payer: MEDICARE

## 2020-08-07 ENCOUNTER — HOME CARE VISIT (OUTPATIENT)
Dept: SCHEDULING | Facility: HOME HEALTH | Age: 85
End: 2020-08-07
Payer: MEDICARE

## 2020-08-07 PROCEDURE — G0152 HHCP-SERV OF OT,EA 15 MIN: HCPCS

## 2020-08-10 ENCOUNTER — HOME CARE VISIT (OUTPATIENT)
Dept: SCHEDULING | Facility: HOME HEALTH | Age: 85
End: 2020-08-10
Payer: MEDICARE

## 2020-08-10 ENCOUNTER — HOME CARE VISIT (OUTPATIENT)
Dept: HOME HEALTH SERVICES | Facility: HOME HEALTH | Age: 85
End: 2020-08-10
Payer: MEDICARE

## 2020-08-10 PROCEDURE — G0151 HHCP-SERV OF PT,EA 15 MIN: HCPCS

## 2020-08-11 ENCOUNTER — HOME CARE VISIT (OUTPATIENT)
Dept: HOME HEALTH SERVICES | Facility: HOME HEALTH | Age: 85
End: 2020-08-11
Payer: MEDICARE

## 2020-08-11 ENCOUNTER — HOME CARE VISIT (OUTPATIENT)
Dept: SCHEDULING | Facility: HOME HEALTH | Age: 85
End: 2020-08-11
Payer: MEDICARE

## 2020-08-11 VITALS — TEMPERATURE: 97 F | HEART RATE: 80 BPM | OXYGEN SATURATION: 90 %

## 2020-08-11 PROCEDURE — G0152 HHCP-SERV OF OT,EA 15 MIN: HCPCS

## 2020-08-13 ENCOUNTER — HOME CARE VISIT (OUTPATIENT)
Dept: HOME HEALTH SERVICES | Facility: HOME HEALTH | Age: 85
End: 2020-08-13
Payer: MEDICARE

## 2020-08-13 ENCOUNTER — HOME CARE VISIT (OUTPATIENT)
Dept: SCHEDULING | Facility: HOME HEALTH | Age: 85
End: 2020-08-13
Payer: MEDICARE

## 2020-08-14 ENCOUNTER — TELEPHONE (OUTPATIENT)
Dept: INTERNAL MEDICINE CLINIC | Age: 85
End: 2020-08-14

## 2020-08-14 ENCOUNTER — HOME CARE VISIT (OUTPATIENT)
Dept: SCHEDULING | Facility: HOME HEALTH | Age: 85
End: 2020-08-14
Payer: MEDICARE

## 2020-08-14 ENCOUNTER — HOME CARE VISIT (OUTPATIENT)
Dept: HOME HEALTH SERVICES | Facility: HOME HEALTH | Age: 85
End: 2020-08-14
Payer: MEDICARE

## 2020-08-14 DIAGNOSIS — M17.0 PRIMARY OSTEOARTHRITIS OF BOTH KNEES: ICD-10-CM

## 2020-08-14 PROCEDURE — G0152 HHCP-SERV OF OT,EA 15 MIN: HCPCS

## 2020-08-14 PROCEDURE — 400013 HH SOC

## 2020-08-14 RX ORDER — DICLOFENAC SODIUM 10 MG/G
GEL TOPICAL
Qty: 100 G | Refills: 2 | Status: SHIPPED | OUTPATIENT
Start: 2020-08-14 | End: 2020-08-14 | Stop reason: SDUPTHER

## 2020-08-14 RX ORDER — DICLOFENAC SODIUM 10 MG/G
GEL TOPICAL
Qty: 100 G | Refills: 2 | Status: SHIPPED | OUTPATIENT
Start: 2020-08-14 | End: 2020-08-24 | Stop reason: SDUPTHER

## 2020-08-14 NOTE — TELEPHONE ENCOUNTER
Returned call. Patient had overnight pulse oximetry that indicated need for oxygen. [facility] said they faxed form to our office for completion of ordered liters and signature.       Also request prescription for Voltaren gel be faxed to [facility] at [phone]

## 2020-08-17 ENCOUNTER — HOME CARE VISIT (OUTPATIENT)
Dept: SCHEDULING | Facility: HOME HEALTH | Age: 85
End: 2020-08-17
Payer: MEDICARE

## 2020-08-17 ENCOUNTER — HOME CARE VISIT (OUTPATIENT)
Dept: HOME HEALTH SERVICES | Facility: HOME HEALTH | Age: 85
End: 2020-08-17
Payer: MEDICARE

## 2020-08-17 VITALS — OXYGEN SATURATION: 90 % | DIASTOLIC BLOOD PRESSURE: 70 MMHG | SYSTOLIC BLOOD PRESSURE: 120 MMHG | TEMPERATURE: 96.8 F

## 2020-08-17 PROCEDURE — G0152 HHCP-SERV OF OT,EA 15 MIN: HCPCS

## 2020-08-18 ENCOUNTER — HOME CARE VISIT (OUTPATIENT)
Dept: HOME HEALTH SERVICES | Facility: HOME HEALTH | Age: 85
End: 2020-08-18
Payer: MEDICARE

## 2020-08-18 ENCOUNTER — HOME CARE VISIT (OUTPATIENT)
Dept: SCHEDULING | Facility: HOME HEALTH | Age: 85
End: 2020-08-18
Payer: MEDICARE

## 2020-08-18 VITALS
OXYGEN SATURATION: 93 % | DIASTOLIC BLOOD PRESSURE: 70 MMHG | TEMPERATURE: 96.8 F | HEART RATE: 81 BPM | SYSTOLIC BLOOD PRESSURE: 120 MMHG

## 2020-08-18 VITALS — TEMPERATURE: 97 F | HEART RATE: 92 BPM | OXYGEN SATURATION: 87 %

## 2020-08-18 VITALS — RESPIRATION RATE: 16 BRPM | TEMPERATURE: 98.2 F | HEART RATE: 75 BPM | OXYGEN SATURATION: 90 %

## 2020-08-18 PROCEDURE — G0151 HHCP-SERV OF PT,EA 15 MIN: HCPCS

## 2020-08-18 NOTE — PROGRESS NOTES
Contacted [facility] due to 02 not being present in her apartment-they have received all needed paperwork/orders and are calling Ms Diana Carrasquillo or [address] to set up delivery.

## 2020-08-19 NOTE — TELEPHONE ENCOUNTER
I called and spoke with Sarath Ashley from [facility].  She states patient received her oxygen supplies on 08/18/2020

## 2020-08-20 ENCOUNTER — HOME CARE VISIT (OUTPATIENT)
Dept: HOME HEALTH SERVICES | Facility: HOME HEALTH | Age: 85
End: 2020-08-20
Payer: MEDICARE

## 2020-08-20 ENCOUNTER — HOME CARE VISIT (OUTPATIENT)
Dept: SCHEDULING | Facility: HOME HEALTH | Age: 85
End: 2020-08-20
Payer: MEDICARE

## 2020-08-20 PROCEDURE — G0151 HHCP-SERV OF PT,EA 15 MIN: HCPCS

## 2020-08-21 ENCOUNTER — HOME CARE VISIT (OUTPATIENT)
Dept: HOME HEALTH SERVICES | Facility: HOME HEALTH | Age: 85
End: 2020-08-21
Payer: MEDICARE

## 2020-08-21 ENCOUNTER — HOME CARE VISIT (OUTPATIENT)
Dept: SCHEDULING | Facility: HOME HEALTH | Age: 85
End: 2020-08-21
Payer: MEDICARE

## 2020-08-21 PROCEDURE — G0152 HHCP-SERV OF OT,EA 15 MIN: HCPCS

## 2020-08-22 VITALS
SYSTOLIC BLOOD PRESSURE: 120 MMHG | OXYGEN SATURATION: 93 % | DIASTOLIC BLOOD PRESSURE: 80 MMHG | HEART RATE: 81 BPM | TEMPERATURE: 96.8 F

## 2020-08-24 ENCOUNTER — HOME CARE VISIT (OUTPATIENT)
Dept: SCHEDULING | Facility: HOME HEALTH | Age: 85
End: 2020-08-24
Payer: MEDICARE

## 2020-08-24 DIAGNOSIS — M17.0 PRIMARY OSTEOARTHRITIS OF BOTH KNEES: ICD-10-CM

## 2020-08-24 DIAGNOSIS — J44.9 CHRONIC OBSTRUCTIVE PULMONARY DISEASE, UNSPECIFIED COPD TYPE (HCC): ICD-10-CM

## 2020-08-24 PROCEDURE — G0151 HHCP-SERV OF PT,EA 15 MIN: HCPCS

## 2020-08-24 NOTE — TELEPHONE ENCOUNTER
----- Message from Elvis Zayas sent at 8/22/2020 11:31 AM EDT -----  Regarding: Dr. Wright Code first and last name: Reina Cook       Reason for call: Assistant living location is calling to check on the Rx for the Pt that were order during the week       Callback required yes/no and why:Y       Best contact number(s):  [phone]    Details to clarify the request:      Elvis Zayas

## 2020-08-24 NOTE — TELEPHONE ENCOUNTER
[facility] called:  Order for oxygen needed.  Durenda Race does not have the script for diclofenac gel  Fax # [phone]  Fax# [phone] mfakrr fax to both #'s

## 2020-08-25 DIAGNOSIS — M17.0 PRIMARY OSTEOARTHRITIS OF BOTH KNEES: ICD-10-CM

## 2020-08-25 DIAGNOSIS — J44.9 CHRONIC OBSTRUCTIVE PULMONARY DISEASE, UNSPECIFIED COPD TYPE (HCC): ICD-10-CM

## 2020-08-25 RX ORDER — DICLOFENAC SODIUM 10 MG/G
GEL TOPICAL
Qty: 100 G | Refills: 2 | Status: SHIPPED | OUTPATIENT
Start: 2020-08-25 | End: 2020-08-25 | Stop reason: SDUPTHER

## 2020-08-25 RX ORDER — DICLOFENAC SODIUM 10 MG/G
GEL TOPICAL
Qty: 100 G | Refills: 2 | Status: SHIPPED | OUTPATIENT
Start: 2020-08-25 | End: 2021-08-04 | Stop reason: SDUPTHER

## 2020-08-25 RX ORDER — DICLOFENAC SODIUM 10 MG/G
GEL TOPICAL
Qty: 100 G | Refills: 2 | Status: SHIPPED | OUTPATIENT
Start: 2020-08-25 | End: 2020-08-25

## 2020-08-25 NOTE — TELEPHONE ENCOUNTER
Requested Prescriptions     Signed Prescriptions Disp Refills    OTHER 1 Each 0     Si L by Nasal route See Admin Instructions. Oxygen concentrator to be used for COPD     Authorizing Provider: Lacey WYNN     Ordering User: Tessa aBrraza diclofenac (VOLTAREN) 1 % gel 100 g 2     Sig: Apply  to affected area two (2) times daily as needed for Pain. Authorizing Provider: Devi Samuel     Ordering User: Adamaris Guzman, verbal order received.

## 2020-08-26 ENCOUNTER — HOME CARE VISIT (OUTPATIENT)
Dept: HOME HEALTH SERVICES | Facility: HOME HEALTH | Age: 85
End: 2020-08-26
Payer: MEDICARE

## 2020-08-26 DIAGNOSIS — G10 HEREDITARY CHOREA (HCC): ICD-10-CM

## 2020-08-26 NOTE — TELEPHONE ENCOUNTER
Requested Prescriptions     Pending Prescriptions Disp Refills    clonazePAM (KlonoPIN) 0.5 mg tablet 45 Tab 0     Sig: Half tablet in the morning and 1 tablet at night. Patient is completely out of medication.   07/28/2020 09/23/2020  [facility] pharmacy

## 2020-08-27 RX ORDER — CLONAZEPAM 0.5 MG/1
TABLET ORAL
Qty: 45 TAB | Refills: 0 | Status: SHIPPED | OUTPATIENT
Start: 2020-08-27 | End: 2020-09-21 | Stop reason: SDUPTHER

## 2020-08-28 VITALS
SYSTOLIC BLOOD PRESSURE: 122 MMHG | OXYGEN SATURATION: 96 % | DIASTOLIC BLOOD PRESSURE: 78 MMHG | HEART RATE: 78 BPM | TEMPERATURE: 98.1 F

## 2020-09-03 ENCOUNTER — HOME CARE VISIT (OUTPATIENT)
Dept: SCHEDULING | Facility: HOME HEALTH | Age: 85
End: 2020-09-03
Payer: MEDICARE

## 2020-09-03 PROCEDURE — G0151 HHCP-SERV OF PT,EA 15 MIN: HCPCS

## 2020-09-04 VITALS
DIASTOLIC BLOOD PRESSURE: 70 MMHG | HEART RATE: 95 BPM | TEMPERATURE: 98.1 F | SYSTOLIC BLOOD PRESSURE: 128 MMHG | RESPIRATION RATE: 18 BRPM | OXYGEN SATURATION: 88 %

## 2020-09-21 DIAGNOSIS — G10 HEREDITARY CHOREA (HCC): ICD-10-CM

## 2020-09-21 RX ORDER — CLONAZEPAM 0.5 MG/1
TABLET ORAL
Qty: 45 TAB | Refills: 0 | Status: SHIPPED | OUTPATIENT
Start: 2020-09-21 | End: 2020-10-01

## 2020-09-21 NOTE — TELEPHONE ENCOUNTER
Requested Prescriptions     Pending Prescriptions Disp Refills    clonazePAM (KlonoPIN) 0.5 mg tablet 45 Tab 0     Sig: Half tablet in the morning and 1 tablet at night.     07/28/2020 09/23/2020  josiah

## 2020-09-23 ENCOUNTER — VIRTUAL VISIT (OUTPATIENT)
Dept: INTERNAL MEDICINE CLINIC | Age: 85
End: 2020-09-23
Payer: MEDICARE

## 2020-09-23 DIAGNOSIS — G10 HEREDITARY CHOREA (HCC): ICD-10-CM

## 2020-09-23 DIAGNOSIS — F02.80 LATE ONSET ALZHEIMER'S DISEASE WITHOUT BEHAVIORAL DISTURBANCE (HCC): Primary | ICD-10-CM

## 2020-09-23 DIAGNOSIS — R73.03 PREDIABETES: ICD-10-CM

## 2020-09-23 DIAGNOSIS — M17.0 PRIMARY OSTEOARTHRITIS OF BOTH KNEES: ICD-10-CM

## 2020-09-23 DIAGNOSIS — E55.9 VITAMIN D DEFICIENCY: ICD-10-CM

## 2020-09-23 DIAGNOSIS — G30.1 LATE ONSET ALZHEIMER'S DISEASE WITHOUT BEHAVIORAL DISTURBANCE (HCC): Primary | ICD-10-CM

## 2020-09-23 DIAGNOSIS — J44.9 CHRONIC OBSTRUCTIVE PULMONARY DISEASE, UNSPECIFIED COPD TYPE (HCC): ICD-10-CM

## 2020-09-23 PROCEDURE — 1100F PTFALLS ASSESS-DOCD GE2>/YR: CPT | Performed by: INTERNAL MEDICINE

## 2020-09-23 PROCEDURE — G8432 DEP SCR NOT DOC, RNG: HCPCS | Performed by: INTERNAL MEDICINE

## 2020-09-23 PROCEDURE — 3288F FALL RISK ASSESSMENT DOCD: CPT | Performed by: INTERNAL MEDICINE

## 2020-09-23 PROCEDURE — G8417 CALC BMI ABV UP PARAM F/U: HCPCS | Performed by: INTERNAL MEDICINE

## 2020-09-23 PROCEDURE — G8536 NO DOC ELDER MAL SCRN: HCPCS | Performed by: INTERNAL MEDICINE

## 2020-09-23 PROCEDURE — 1090F PRES/ABSN URINE INCON ASSESS: CPT | Performed by: INTERNAL MEDICINE

## 2020-09-23 PROCEDURE — 99214 OFFICE O/P EST MOD 30 MIN: CPT | Performed by: INTERNAL MEDICINE

## 2020-09-23 PROCEDURE — G8427 DOCREV CUR MEDS BY ELIG CLIN: HCPCS | Performed by: INTERNAL MEDICINE

## 2020-09-23 NOTE — PROGRESS NOTES
Health Maintenance Due   Topic Date Due    Statin Therapy  10/21/1930    DTaP/Tdap/Td series (1 - Tdap) 10/21/1951    Shingrix Vaccine Age 50> (1 of 2) 10/21/1980    GLAUCOMA SCREENING Q2Y  04/09/2017    Flu Vaccine (1) 09/01/2020       Chief Complaint   Patient presents with    COPD    Anxiety     stress over 's confusion       1. Have you been to the ER, urgent care clinic since your last visit? Hospitalized since your last visit? No    2. Have you seen or consulted any other health care providers outside of the [address] [place] [address] since your last visit? Include any pap smears or colon screening. No    3) Do you have an Advance Directive on file? yes    4) Are you interested in receiving information on Advance Directives? NO      Patient is accompanied by daughter I have received verbal consent from Renetta Bishop to discuss any/all medical information while they are present in the room.

## 2020-09-23 NOTE — PROGRESS NOTES
Maria Ines Talbert is a 80 y.o. female who was seen by synchronous (real-time) audio-video technology on 9/23/2020 for COPD and Anxiety (stress over 's confusion)        Assessment & Plan:   Diagnoses and all orders for this visit:    1. Late onset Alzheimer's disease without behavioral disturbance (HCC)    Stable. On Namenda. She is in assisted living in [facility]. Will check,  -     METABOLIC PANEL, COMPREHENSIVE    2. Chronic obstructive pulmonary disease, unspecified COPD type ([facility] [address]  Stable. On Breo Ellipta. 3. Primary osteoarthritis of both knees  On Voltaren gel, also on Tylenol as needed. 4. Hereditary chorea ([facility] [address]    Taking Klonopin. Doing well. Roney under control.  -     METABOLIC PANEL, COMPREHENSIVE    5. Vitamin D deficiency  -     VITAMIN D, 25 HYDROXY    6. Prediabetes    We will check,  -     HEMOGLOBIN A1C WITH EAG        I spent at least 25 minutes on this visit with this established patient. Subjective:   Josh Booker is here for follow-up. She is accompanied by her daughter. She lives in [facility] assisted living with her . Has dementia which is progressively and slowly getting worse. Has a head injury roney which is under control with Klonopin. Refill just given. He has COPD, using inhaler. Breathing seems under control. Using oxygen at night. Has DJD in both knees. Using diclofenac gel which helps her. Need flu vaccine. Labs reviewed with her, need new lab work. Prior to Admission medications    Medication Sig Start Date End Date Taking? Authorizing Provider   clonazePAM (KlonoPIN) 0.5 mg tablet Half tablet in the morning and 1 tablet at night. 9/21/20  Yes Germain Snellen, MD   OTHER 2 L by Nasal route See Admin Instructions. Oxygen concentrator to be used for COPD 8/25/20  Yes Germain Snellen, MD   diclofenac (VOLTAREN) 1 % gel Apply  to affected area two (2) times daily as needed for Pain.  8/25/20  Yes Germain Snellen, MD   fluticasone furoate-vilanteroL (Breo Ellipta) 100-25 mcg/dose inhaler Take 1 Puff by inhalation daily as needed (SOB). 5/26/20  Yes Duke Geiger MD   memantine [facility]) 10 mg tablet TAKE ONE TABLET BY MOUTH 2 TIMES A DAY 3/24/20  Yes Duke Geiger MD   azelastine (OPTIVAR) 0.05 % ophthalmic solution Administer 1 Drop to right eye two (2) times daily as needed (allergy). Use in affected eye(s) 5/28/19  Yes Duke Geiger MD   acetaminophen (TYLENOL) 500 mg tablet Take 1 Tab by mouth two (2) times daily as needed for Pain. 10/17/17  Yes Duke Geiger MD   aspirin delayed-release (ECOTRIN LOW STRENGTH) 81 mg tablet Take 1 Tab by mouth daily. 1/17/17  Yes Duke Geiger MD   calcium citrate-vitamin d3 (CITRACAL+D) 315-200 mg-unit tab Take 1 Tab by mouth daily (with breakfast). Patient taking differently: Take 0.5 Tabs by mouth two (2) times daily (with meals). 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     Objective:     Patient-Reported Vitals 7/28/2020   Patient-Reported Height 5f2        Constitutional: [x] Appears well-developed and well-nourished [x] No apparent distress      [] Abnormal -     Mental status: [x] Alert and awake  [x] Oriented to person/place/time [x] Able to follow commands    [] Abnormal -       HENT: [x] Normocephalic, atraumatic  [] Abnormal -   [x] Mouth/Throat: Mucous membranes are moist    External Ears [x] Normal  [] Abnormal -    Neck: [x] No visualized mass [] Abnormal -     Pulmonary/Chest: [x] Respiratory effort normal   [x] No visualized signs of difficulty breathing or respiratory distress        [] Abnormal -      Musculoskeletal:   [x] Normal gait with no signs of ataxia         [x] Normal range of motion of neck        [] Abnormal -     Neurological:        [x] No Facial Asymmetry (Cranial nerve 7 motor function) (limited exam due to video visit)          [x] No gaze palsy        [] Abnormal -          Skin:        [x] No significant exanthematous lesions or discoloration noted on facial skin         [] Abnormal -            Psychiatric:       [x] Normal Affect [] Abnormal -        [x] No Hallucinations  Demented person. Roney under control. Other pertinent observable physical exam findings:-        We discussed the expected course, resolution and complications of the diagnosis(es) in detail. Medication risks, benefits, costs, interactions, and alternatives were discussed as indicated. I advised her to contact the office if her condition worsens, changes or fails to improve as anticipated. She expressed understanding with the diagnosis(es) and plan. Lázaro Avina, who was evaluated through a patient-initiated, synchronous (real-time) audio-video encounter, and/or her healthcare decision maker, is aware that it is a billable service, with coverage as determined by her insurance carrier. She provided verbal consent to proceed: Yes, and patient identification was verified. It was conducted pursuant to the emergency declaration under the [facility], [address] [place] [address] authority and the [employer] and [employer] General Act. A caregiver was present when appropriate. Ability to conduct physical exam was limited. I was in the office. The patient was at home.       Atiya Alonzo MD

## 2020-10-01 ENCOUNTER — HOSPITAL ENCOUNTER (OUTPATIENT)
Age: 85
Setting detail: OBSERVATION
Discharge: HOME | DRG: 378 | End: 2020-10-05
Attending: EMERGENCY MEDICINE | Admitting: HOSPITALIST
Payer: MEDICARE

## 2020-10-01 ENCOUNTER — TELEPHONE (OUTPATIENT)
Dept: INTERNAL MEDICINE CLINIC | Age: 85
End: 2020-10-01

## 2020-10-01 DIAGNOSIS — R00.0 SINUS TACHYCARDIA: ICD-10-CM

## 2020-10-01 DIAGNOSIS — N19 UREMIA: ICD-10-CM

## 2020-10-01 DIAGNOSIS — E86.0 DEHYDRATION: ICD-10-CM

## 2020-10-01 DIAGNOSIS — K92.0 GASTROINTESTINAL HEMORRHAGE WITH HEMATEMESIS: Primary | ICD-10-CM

## 2020-10-01 PROBLEM — K92.2 GI BLEED: Status: ACTIVE | Noted: 2020-10-01

## 2020-10-01 LAB
ABO + RH BLD: NORMAL
ALBUMIN SERPL-MCNC: 3.2 G/DL (ref 3.5–5)
ALBUMIN/GLOB SERPL: 0.7 {RATIO} (ref 1.1–2.2)
ALP SERPL-CCNC: 86 U/L (ref 45–117)
ALT SERPL-CCNC: 14 U/L (ref 12–78)
ANION GAP SERPL CALC-SCNC: 7 MMOL/L (ref 5–15)
ARTERIAL PATENCY WRIST A: YES
AST SERPL-CCNC: 16 U/L (ref 15–37)
ATRIAL RATE: 104 BPM
BASE EXCESS BLD CALC-SCNC: 1 MMOL/L
BASOPHILS # BLD: 0.1 K/UL (ref 0–0.1)
BASOPHILS NFR BLD: 1 % (ref 0–1)
BDY SITE: ABNORMAL
BILIRUB SERPL-MCNC: 0.5 MG/DL (ref 0.2–1)
BLOOD GROUP ANTIBODIES SERPL: NORMAL
BUN SERPL-MCNC: 46 MG/DL (ref 6–20)
BUN/CREAT SERPL: 36 (ref 12–20)
CA-I BLD-SCNC: 1.28 MMOL/L (ref 1.12–1.32)
CALCIUM SERPL-MCNC: 9.2 MG/DL (ref 8.5–10.1)
CALCULATED P AXIS, ECG09: 42 DEGREES
CALCULATED R AXIS, ECG10: -9 DEGREES
CALCULATED T AXIS, ECG11: 58 DEGREES
CHLORIDE SERPL-SCNC: 107 MMOL/L (ref 97–108)
CO2 SERPL-SCNC: 24 MMOL/L (ref 21–32)
COMMENT, HOLDF: NORMAL
CREAT SERPL-MCNC: 1.29 MG/DL (ref 0.55–1.02)
DIAGNOSIS, 93000: NORMAL
DIFFERENTIAL METHOD BLD: ABNORMAL
EOSINOPHIL # BLD: 0.1 K/UL (ref 0–0.4)
EOSINOPHIL NFR BLD: 1 % (ref 0–7)
ERYTHROCYTE [DISTWIDTH] IN BLOOD BY AUTOMATED COUNT: 16.4 % (ref 11.5–14.5)
GAS FLOW.O2 O2 DELIVERY SYS: ABNORMAL L/MIN
GAS FLOW.O2 SETTING OXYMISER: 3 L/M
GLOBULIN SER CALC-MCNC: 4.4 G/DL (ref 2–4)
GLUCOSE SERPL-MCNC: 113 MG/DL (ref 65–100)
HCO3 BLD-SCNC: 26.3 MMOL/L (ref 22–26)
HCT VFR BLD AUTO: 39.6 % (ref 35–47)
HEALTH STATUS, XMCV2T: NORMAL
HGB BLD-MCNC: 11.1 G/DL (ref 11.5–16)
HGB BLD-MCNC: 12.6 G/DL (ref 11.5–16)
IMM GRANULOCYTES # BLD AUTO: 0 K/UL (ref 0–0.04)
IMM GRANULOCYTES NFR BLD AUTO: 0 % (ref 0–0.5)
INR PPP: 1 (ref 0.9–1.1)
LYMPHOCYTES # BLD: 1.1 K/UL (ref 0.8–3.5)
LYMPHOCYTES NFR BLD: 12 % (ref 12–49)
MCH RBC QN AUTO: 29.2 PG (ref 26–34)
MCHC RBC AUTO-ENTMCNC: 31.8 G/DL (ref 30–36.5)
MCV RBC AUTO: 91.9 FL (ref 80–99)
MONOCYTES # BLD: 0.6 K/UL (ref 0–1)
MONOCYTES NFR BLD: 7 % (ref 5–13)
NEUTS SEG # BLD: 6.7 K/UL (ref 1.8–8)
NEUTS SEG NFR BLD: 79 % (ref 32–75)
NRBC # BLD: 0 K/UL (ref 0–0.01)
NRBC BLD-RTO: 0 PER 100 WBC
P-R INTERVAL, ECG05: 180 MS
PCO2 BLD: 48.4 MMHG (ref 35–45)
PH BLD: 7.34 [PH] (ref 7.35–7.45)
PLATELET # BLD AUTO: 190 K/UL (ref 150–400)
PMV BLD AUTO: 10.7 FL (ref 8.9–12.9)
PO2 BLD: 97 MMHG (ref 80–100)
POTASSIUM SERPL-SCNC: 4.5 MMOL/L (ref 3.5–5.1)
PROT SERPL-MCNC: 7.6 G/DL (ref 6.4–8.2)
PROTHROMBIN TIME: 10.7 SEC (ref 9–11.1)
Q-T INTERVAL, ECG07: 360 MS
QRS DURATION, ECG06: 82 MS
QTC CALCULATION (BEZET), ECG08: 473 MS
RBC # BLD AUTO: 4.31 M/UL (ref 3.8–5.2)
SAMPLES BEING HELD,HOLD: NORMAL
SAO2 % BLD: 97 % (ref 92–97)
SARS-COV-2, COV2: NOT DETECTED
SODIUM SERPL-SCNC: 138 MMOL/L (ref 136–145)
SOURCE, COVRS: NORMAL
SPECIMEN EXP DATE BLD: NORMAL
SPECIMEN SOURCE, FCOV2M: NORMAL
SPECIMEN TYPE, XMCV1T: NORMAL
SPECIMEN TYPE: ABNORMAL
TOTAL RESP. RATE, ITRR: 20
TROPONIN I SERPL-MCNC: 0.07 NG/ML
VENTRICULAR RATE, ECG03: 104 BPM
WBC # BLD AUTO: 8.4 K/UL (ref 3.6–11)

## 2020-10-01 PROCEDURE — 86901 BLOOD TYPING SEROLOGIC RH(D): CPT

## 2020-10-01 PROCEDURE — 36600 WITHDRAWAL OF ARTERIAL BLOOD: CPT

## 2020-10-01 PROCEDURE — 74011000250 HC RX REV CODE- 250: Performed by: EMERGENCY MEDICINE

## 2020-10-01 PROCEDURE — 74011250636 HC RX REV CODE- 250/636: Performed by: EMERGENCY MEDICINE

## 2020-10-01 PROCEDURE — 99285 EMERGENCY DEPT VISIT HI MDM: CPT

## 2020-10-01 PROCEDURE — 85610 PROTHROMBIN TIME: CPT

## 2020-10-01 PROCEDURE — 99218 HC RM OBSERVATION: CPT

## 2020-10-01 PROCEDURE — 85018 HEMOGLOBIN: CPT

## 2020-10-01 PROCEDURE — 74011250636 HC RX REV CODE- 250/636: Performed by: FAMILY MEDICINE

## 2020-10-01 PROCEDURE — 96374 THER/PROPH/DIAG INJ IV PUSH: CPT

## 2020-10-01 PROCEDURE — 82803 BLOOD GASES ANY COMBINATION: CPT

## 2020-10-01 PROCEDURE — 80053 COMPREHEN METABOLIC PANEL: CPT

## 2020-10-01 PROCEDURE — 36415 COLL VENOUS BLD VENIPUNCTURE: CPT

## 2020-10-01 PROCEDURE — 96375 TX/PRO/DX INJ NEW DRUG ADDON: CPT

## 2020-10-01 PROCEDURE — 96376 TX/PRO/DX INJ SAME DRUG ADON: CPT

## 2020-10-01 PROCEDURE — 87635 SARS-COV-2 COVID-19 AMP PRB: CPT

## 2020-10-01 PROCEDURE — 74011250637 HC RX REV CODE- 250/637: Performed by: FAMILY MEDICINE

## 2020-10-01 PROCEDURE — 93005 ELECTROCARDIOGRAM TRACING: CPT

## 2020-10-01 PROCEDURE — 84484 ASSAY OF TROPONIN QUANT: CPT

## 2020-10-01 PROCEDURE — C9113 INJ PANTOPRAZOLE SODIUM, VIA: HCPCS | Performed by: EMERGENCY MEDICINE

## 2020-10-01 PROCEDURE — G0378 HOSPITAL OBSERVATION PER HR: HCPCS

## 2020-10-01 PROCEDURE — 85025 COMPLETE CBC W/AUTO DIFF WBC: CPT

## 2020-10-01 RX ORDER — SODIUM CHLORIDE 0.9 % (FLUSH) 0.9 %
5-40 SYRINGE (ML) INJECTION AS NEEDED
Status: DISCONTINUED | OUTPATIENT
Start: 2020-10-01 | End: 2020-10-05 | Stop reason: HOSPADM

## 2020-10-01 RX ORDER — SODIUM CHLORIDE 9 MG/ML
75 INJECTION, SOLUTION INTRAVENOUS CONTINUOUS
Status: DISCONTINUED | OUTPATIENT
Start: 2020-10-01 | End: 2020-10-05 | Stop reason: HOSPADM

## 2020-10-01 RX ORDER — ONDANSETRON 2 MG/ML
4 INJECTION INTRAMUSCULAR; INTRAVENOUS
Status: DISCONTINUED | OUTPATIENT
Start: 2020-10-01 | End: 2020-10-05 | Stop reason: HOSPADM

## 2020-10-01 RX ORDER — SODIUM CHLORIDE 0.9 % (FLUSH) 0.9 %
5-40 SYRINGE (ML) INJECTION EVERY 8 HOURS
Status: DISCONTINUED | OUTPATIENT
Start: 2020-10-01 | End: 2020-10-01 | Stop reason: SDUPTHER

## 2020-10-01 RX ORDER — CLONAZEPAM 0.5 MG/1
0.5 TABLET ORAL
COMMUNITY
End: 2020-10-05

## 2020-10-01 RX ORDER — CLONAZEPAM 0.5 MG/1
0.25 TABLET ORAL DAILY
COMMUNITY
End: 2020-10-28 | Stop reason: SDUPTHER

## 2020-10-01 RX ORDER — SODIUM CHLORIDE 0.9 % (FLUSH) 0.9 %
5-40 SYRINGE (ML) INJECTION EVERY 8 HOURS
Status: DISCONTINUED | OUTPATIENT
Start: 2020-10-01 | End: 2020-10-05 | Stop reason: HOSPADM

## 2020-10-01 RX ORDER — ALBUTEROL SULFATE 90 UG/1
2 AEROSOL, METERED RESPIRATORY (INHALATION)
Status: DISCONTINUED | OUTPATIENT
Start: 2020-10-01 | End: 2020-10-02 | Stop reason: CLARIF

## 2020-10-01 RX ADMIN — SODIUM CHLORIDE 80 MG: 9 INJECTION INTRAMUSCULAR; INTRAVENOUS; SUBCUTANEOUS at 11:01

## 2020-10-01 RX ADMIN — SODIUM CHLORIDE 75 ML/HR: 900 INJECTION, SOLUTION INTRAVENOUS at 17:11

## 2020-10-01 RX ADMIN — Medication 10 ML: at 22:12

## 2020-10-01 RX ADMIN — SODIUM CHLORIDE 1000 ML: 9 INJECTION, SOLUTION INTRAVENOUS at 11:01

## 2020-10-01 RX ADMIN — SODIUM CHLORIDE 40 MG: 9 INJECTION, SOLUTION INTRAMUSCULAR; INTRAVENOUS; SUBCUTANEOUS at 22:12

## 2020-10-01 RX ADMIN — Medication 10 ML: at 17:05

## 2020-10-01 RX ADMIN — ONDANSETRON 4 MG: 2 INJECTION INTRAMUSCULAR; INTRAVENOUS at 11:01

## 2020-10-01 RX ADMIN — Medication 10 ML: at 22:13

## 2020-10-01 NOTE — TELEPHONE ENCOUNTER
Sun Burt's call and after verifying HIPAA advised her that Dr. Audra Weir does not have hospital privileges and is unable to see her mother in the hospital. Also advised her that since Dr. Audra Weir is listed as the pt's PCP she will receive notifications of all tests and procedures done. Kurt Kimball voiced thanks and understanding.

## 2020-10-01 NOTE — CONSULTS
[address] [place] [address] NOTE  [facility]  [phone] NP in-hospital cell phone M-F until 4:30  After 5pm or on weekends, please call  for physician on call        NAME:  Jarad Morgan   :   10/21/1930   MRN:   [id number]       Referring Physician: Carole Singer Date: 10/1/2020 2:25 PM    Chief Complaint: UGIB     History of Present Illness:  Patient is a 80 y.o. who is seen in consultation at the request of Dr. Joey Santo for UGIB. Medical history as listed below includes COPD and stroke. She presented from living facility for 2 episodes of coffee ground emesis this morning and one episode of melena. She denies abdominal pain, nausea, or change in bowel habits. She wears oxygen at night but here is requiring 2L with stats ~90%. Occasional NSAID's. Denies tobacco or alcohol. No anticoagulation. Remote history of colonoscopy. I have reviewed the emergency room note, hospital admission note, notes by all other clinicians who have seen the patient during this hospitalization to date. I have reviewed the problem list and the reason for this hospitalization. I have reviewed the allergies and the medications the patient was taking at home prior to this hospitalization. 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 Taking?   acetaminophen (TYLENOL) 500 mg tablet   No No   Sig: Take 1 Tab by mouth two (2) times daily as needed for Pain. aspirin delayed-release (ECOTRIN LOW STRENGTH) 81 mg tablet   No Yes   Sig: Take 1 Tab by mouth daily. azelastine (OPTIVAR) 0.05 % ophthalmic solution   No No   Sig: Administer 1 Drop to right eye two (2) times daily as needed (allergy). Use in affected eye(s)   calcium citrate-vitamin d3 (CITRACAL+D) 315-200 mg-unit tab   No No   Sig: Take 1 Tab by mouth daily (with breakfast). Patient taking differently: Take 0.5 Tabs by mouth two (2) times daily (with meals). clonazePAM (KlonoPIN) 0.5 mg tablet   Yes Yes   Sig: Take 0.5 mg by mouth nightly. clonazePAM (KlonoPIN) 0.5 mg tablet   Yes Yes   Sig: Take 0.25 mg by mouth daily. diclofenac (VOLTAREN) 1 % gel   No No   Sig: Apply  to affected area two (2) times daily as needed for Pain. fluticasone furoate-vilanteroL (Breo Ellipta) 100-25 mcg/dose inhaler   No No   Sig: Take 1 Puff by inhalation daily as needed (SOB). Patient taking differently: Take 1 Puff by inhalation every eight (8) hours as needed (SOB). memantine (NAMENDA) 10 mg tablet   No No   Sig: TAKE ONE TABLET BY MOUTH 2 TIMES A DAY      Facility-Administered Medications: None       Hospital Medications:  Current Facility-Administered Medications   Medication Dose Route Frequency    sodium chloride (NS) flush 5-40 mL  5-40 mL IntraVENous Q8H    sodium chloride (NS) flush 5-40 mL  5-40 mL IntraVENous PRN    ondansetron (ZOFRAN) injection 4 mg  4 mg IntraVENous Q4H PRN     Current Outpatient Medications   Medication Sig    clonazePAM (KlonoPIN) 0.5 mg tablet Take 0.5 mg by mouth nightly.  clonazePAM (KlonoPIN) 0.5 mg tablet Take 0.25 mg by mouth daily.     aspirin delayed-release (ECOTRIN LOW STRENGTH) 81 mg tablet Take 1 Tab by mouth daily.  diclofenac (VOLTAREN) 1 % gel Apply  to affected area two (2) times daily as needed for Pain.  fluticasone furoate-vilanteroL (Breo Ellipta) 100-25 mcg/dose inhaler Take 1 Puff by inhalation daily as needed (SOB). (Patient taking differently: Take 1 Puff by inhalation every eight (8) hours as needed (SOB). )    memantine (NAMENDA) 10 mg tablet TAKE ONE TABLET BY MOUTH 2 TIMES A DAY    azelastine (OPTIVAR) 0.05 % ophthalmic solution Administer 1 Drop to right eye two (2) times daily as needed (allergy). Use in affected eye(s)    acetaminophen (TYLENOL) 500 mg tablet Take 1 Tab by mouth two (2) times daily as needed for Pain.  calcium citrate-vitamin d3 (CITRACAL+D) 315-200 mg-unit tab Take 1 Tab by mouth daily (with breakfast). (Patient taking differently: Take 0.5 Tabs by mouth two (2) times daily (with meals). )       Social History:  Social History     Tobacco Use    Smoking status: Former Smoker     Packs/day: 1.00     Years: 40.00     Pack years: 40.00     Last attempt to quit: 1982     Years since quittin.3    Smokeless tobacco: Never Used   Substance Use Topics    Alcohol use: No       Family History:  Family History   Problem Relation Age of Onset    Hypertension Maternal Grandmother     Hypertension Maternal Grandfather     Hypertension Paternal Grandmother     Hypertension Paternal Grandfather     MS Daughter     No Known Problems Mother     No Known Problems Father        Review of Systems:  Constitutional: negative fever, negative chills, negative weight loss  Eyes:   negative visual changes  ENT:   negative sore throat, tongue or lip swelling  Respiratory:  negative cough, negative dyspnea  Cards:  negative for chest pain, palpitations, lower extremity edema  GI:   See HPI  :  negative for frequency, dysuria  Integument:  negative for rash and pruritus  Heme:  negative for easy bruising and gum/nose bleeding  Musculoskeletal:negative for myalgias, back pain and muscle weakness  Neuro:  negative for headaches, dizziness, vertigo  Psych:  negative for feelings of anxiety, depression     Objective:     Patient Vitals for the past 8 hrs:   BP Temp Pulse Resp SpO2   10/01/20 1315 114/67  (!) 105 21 96 %   10/01/20 1200 102/62  97 18 96 %   10/01/20 1100 98/66  (!) 103 25 95 %   10/01/20 1011 100/64 99.2 °F (37.3 °C) (!) 107 20      10/01 0701 - 10/01 1900  In: 1000 [I.V.:1000]  Out: -   No intake/output data recorded.     EXAM:     CONST:  Pleasant lady lying in bed, no acute distress   NEURO:  alert and oriented x 3   HEENT: EOMI, no scleral icterus   LUNGS: diminished   CARD:  S1 S2   ABD:  soft, no tenderness, no rebound, bowel sounds (+) all 4 quadrants, no masses, non distended   EXT:  warm   PSYCH: full, not anxious     Data Review     Recent Labs     10/01/20  1029   WBC 8.4   HGB 12.6   HCT 39.6        Recent Labs     10/01/20  1029      K 4.5      CO2 24   BUN 46*   CREA 1.29*   *   CA 9.2     Recent Labs     10/01/20  1029   AP 86   TP 7.6   ALB 3.2*   GLOB 4.4*     Recent Labs     10/01/20  1029   INR 1.0   PTP 10.7         Assessment:     · GI bleed: coffee-ground emesis, melena; hgb 12.6, INR 1  · COVID PUI  · COPD: new oxygen requirement     Patient Active Problem List   Diagnosis Code    Cerebral thrombosis with cerebral infarction (HCC) I63.30    COPD (chronic obstructive pulmonary disease) ([facility]) J44.9    Chronic respiratory failure with hypoxia ([facility]) J96.11    Sinus tachycardia R00.0    S/P knee replacement Z96.659    Obstructive sleep apnea G47.33    Dyslipidemia E78.5    Debility R53.81    Nausea and vomiting R11.2    UTI (urinary tract infection) N39.0    Hereditary chorea (HCC) G10    Late onset Alzheimer's disease without behavioral disturbance (HCC) G30.1, F02.80    GI bleed K92.2     Plan:     · BID PPI  · Trend CBC, transfuse as necessary  · Consider EGD based on clinical course · Patient discussed with and will be seen by Dr. Tony Valles  · Thank you for allowing me to participate in care of Sasha Rigoberto     Signed By: Alisa Matthews NP     10/1/2020  2:25 PM     Agree with above  No urgent indication for EGD  Will follow

## 2020-10-01 NOTE — ED TRIAGE NOTES
Pt arrives with EMS from [facility] for c/o multiple episodes of vomiting with coffee ground emesis this AM and an episode of bloody stool. Unsure if stool was dark or bright red. Pt denies any complaints at this time.

## 2020-10-01 NOTE — PROGRESS NOTES
1930: Bedside shift change report given to Edgardo Maria RN (oncoming nurse) by Vignesh Victoria RN (offgoing nurse). Report included the following information SBAR, Kardex, ED Summary, Intake/Output, MAR, Accordion, Recent Results and Cardiac Rhythm NSR/ST. Primary Nurse Rg Chen and Randy Call RN performed a dual skin assessment on this patient No impairment noted  Alejandro score is 16.    1645: Report received from Randy Call [facility]. Problem: Falls - Risk of  Goal: *Absence of Falls  Description: Document Earma Susan Fall Risk and appropriate interventions in the flowsheet. Outcome: Progressing Towards Goal  Note: Fall Risk Interventions:  Mobility Interventions: Bed/chair exit alarm, Communicate number of staff needed for ambulation/transfer, Patient to call before getting OOB    Mentation Interventions: Adequate sleep, hydration, pain control, Bed/chair exit alarm, Reorient patient    Medication Interventions: Teach patient to arise slowly, Patient to call before getting OOB, Bed/chair exit alarm    Elimination Interventions: Call light in reach, Patient to call for help with toileting needs, Toileting schedule/hourly rounds     Problem: Pressure Injury - Risk of  Goal: *Prevention of pressure injury  Description: Document Alejandro Scale and appropriate interventions in the flowsheet.   Outcome: Progressing Towards Goal  Note: Pressure Injury Interventions:  Sensory Interventions: Assess changes in LOC, Assess need for specialty bed, Check visual cues for pain, Minimize linen layers    Moisture Interventions: Apply protective barrier, creams and emollients, Absorbent underpads, Internal/External urinary devices    Activity Interventions: Increase time out of bed, Pressure redistribution bed/mattress(bed type)    Mobility Interventions: HOB 30 degrees or less, Pressure redistribution bed/mattress (bed type)    Nutrition Interventions: Document food/fluid/supplement intake

## 2020-10-01 NOTE — PROGRESS NOTES
Admission Medication Reconciliation:          PTA med list compiled from [facility] facility transfer documents which were scanned into chart. Medication changes (since last review):  Revised:  Clonazepam to reflect different morning and evening doses  Breo Ellipta to Q8H prn    Thank you for allowing me to participate in the care of your patient. Julia Gloria PharmD, RN # [phone]       [facility] pharmacy benefit data reflects medications filled and processed through the patient's insurance, however   this data does NOT capture whether the medication was picked up or is currently being taken by the patient. 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   acetaminophen (TYLENOL) 500 mg tablet   No   Sig: Take 1 Tab by mouth two (2) times daily as needed for Pain. aspirin delayed-release (ECOTRIN LOW STRENGTH) 81 mg tablet   Yes   Sig: Take 1 Tab by mouth daily. azelastine (OPTIVAR) 0.05 % ophthalmic solution   No   Sig: Administer 1 Drop to right eye two (2) times daily as needed (allergy). Use in affected eye(s)   calcium citrate-vitamin d3 (CITRACAL+D) 315-200 mg-unit tab   No   Sig: Take 1 Tab by mouth daily (with breakfast). Patient taking differently: Take 0.5 Tabs by mouth two (2) times daily (with meals). clonazePAM (KlonoPIN) 0.5 mg tablet   Yes   Sig: Take 0.5 mg by mouth nightly.    clonazePAM (KlonoPIN) 0.5 mg tablet Yes   Sig: Take 0.25 mg by mouth daily. diclofenac (VOLTAREN) 1 % gel   No   Sig: Apply  to affected area two (2) times daily as needed for Pain. fluticasone furoate-vilanteroL (Breo Ellipta) 100-25 mcg/dose inhaler   No   Sig: Take 1 Puff by inhalation daily as needed (SOB). Patient taking differently: Take 1 Puff by inhalation every eight (8) hours as needed (SOB). memantine (NAMENDA) 10 mg tablet   No   Sig: TAKE ONE TABLET BY MOUTH 2 TIMES A DAY      Facility-Administered Medications: None       Please contact the main inpatient pharmacy with any questions or concerns at [phone] and we will direct you to the clinical pharmacist covering this patient's care while in-house.    Caryle Lipps, [facility]

## 2020-10-01 NOTE — ED PROVIDER NOTES
71-year-old female with a history of COPD on 2 L of oxygen PRN, dementia, stroke was sent from her facility for 2 episodes of coffee-ground emesis and one episode of blood in her stool. Patient is reportedly at her baseline level of confusion and she has no complaints. She says her abdomen does not hurt. She says her mouth feels like paper. She was taken off for 2 L when she first arrived and her oxygen saturation dropped into the 80s, so she is back on 2 L of oxygen via nasal cannula. She takes 81 mg of aspirin daily.            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,TIA    Unspecified sleep apnea     NO CPAP       Past Surgical History:   Procedure Laterality Date    ABDOMEN SURGERY PROC UNLISTED      hernia repair    HX GYN  1963    c section    HX HEENT      status post T&A    HX OOPHORECTOMY      HX ORTHOPAEDIC      status post left total knee replacement         Family History:   Problem Relation Age of Onset    Hypertension Maternal Grandmother     Hypertension Maternal Grandfather     Hypertension Paternal Grandmother     Hypertension Paternal Grandfather     MS Daughter     No Known Problems Mother     No Known Problems Father        Social History     Socioeconomic History    Marital status:      Spouse name: Not on file    Number of children: Not on file    Years of education: Not on file    Highest education level: Not on file   Occupational History    Not on file   Social Needs    Financial resource strain: Not on file    Food insecurity     Worry: Not on file     Inability: Not on file    Transportation needs     Medical: Not on file     Non-medical: Not on file   Tobacco Use    Smoking status: Former Smoker     Packs/day: 1.00 Years: 40.00     Pack years: 40.00     Last attempt to quit: 1982     Years since quittin.3    Smokeless tobacco: Never Used   Substance and Sexual Activity    Alcohol use: No    Drug use: No    Sexual activity: Yes     Partners: Male     Comment: ,2 daughters. retired. Lifestyle    Physical activity     Days per week: Not on file     Minutes per session: Not on file    Stress: Not on file   Relationships    Social connections     Talks on phone: Not on file     Gets together: Not on file     Attends Advent service: Not on file     Active member of club or organization: Not on file     Attends meetings of clubs or organizations: Not on file     Relationship status: Not on file    Intimate partner violence     Fear of current or ex partner: Not on file     Emotionally abused: Not on file     Physically abused: Not on file     Forced sexual activity: Not on file   Other Topics Concern    Not on file   Social History Narrative    Moved to [place], [place] 20 years ago for 10 years----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 HENT: Negative for trouble swallowing. Eyes: Negative for visual disturbance. Respiratory: Negative for cough. Cardiovascular: Negative for chest pain. Gastrointestinal: Negative for abdominal pain. Genitourinary: Negative for difficulty urinating. Musculoskeletal: Negative for gait problem. Skin: Negative for rash. Neurological: Negative for headaches. Hematological: Bruises/bleeds easily. Psychiatric/Behavioral: Negative for sleep disturbance. There were no vitals filed for this visit. Physical Exam  Constitutional:       General: She is not in acute distress. Appearance: Normal appearance. She is not ill-appearing or toxic-appearing. HENT:      Head: Normocephalic. Nose: Nose normal.      Mouth/Throat:      Mouth: Mucous membranes are dry.    Eyes: Extraocular Movements: Extraocular movements intact. Conjunctiva/sclera: Conjunctivae normal.   Cardiovascular:      Rate and Rhythm: Tachycardia present. Pulmonary:      Effort: Pulmonary effort is normal. No respiratory distress. Abdominal:      General: Abdomen is flat. Palpations: Abdomen is soft. Tenderness: There is no abdominal tenderness. There is no guarding. Musculoskeletal: Normal range of motion. Skin:     Findings: No rash. Neurological:      Mental Status: She is alert. Mental status is at baseline. She is disoriented. Psychiatric:         Behavior: Behavior normal.          MDM  Number of Diagnoses or Management Options  Dehydration:   Gastrointestinal hemorrhage with hematemesis:   Sinus tachycardia:   Uremia:   Diagnosis management comments: EKG at 1021  Sinus tachycardia with normal axis at a rate of 101 bpm  CA, QRS within normal limits, but QTc is mildly prolonged at 471 ms  No ST changes    Perfect Serve Consult for Admission  12:05 PM    ED Room Number: ER24/24  Patient Name and age:  Krissy Adair 80 y.o.  female  Working Diagnosis: Gastrointestinal hemorrhage with hematemesis  (primary encounter diagnosis)  Uremia  Dehydration  Sinus tachycardia    COVID-19 Suspicion:  no  Sepsis present:  no  Reassessment needed: no  Code Status:  Full Code  Readmission: no  Isolation Requirements:  no  Recommended Level of Care:  telemetry  Department:[facility] Adult ED - 21   Other:  Demented pt w/ 2 episodes of coffee ground emesis and one bloody bowel movement. Hgb stable. Looks dry. IVF, protonix, and zofran given. GI has been paged.            Procedures

## 2020-10-01 NOTE — H&P
History & Physical    Primary Care Provider: Juma Stover MD  Source of Information: Patient and chart review    History of Presenting Illness:   Lauren Terry is a 80 y.o. female who presents with GI bleed    Patient is a poor historian, history was panel obtained from chart review, patient apparently came to the ER because she had 2 episodes of coffee-ground emesis and one episode of blood in her stool per the facility. Patient lives at [facility], came in today by EMS, with above-mentioned complaint. Patient reports that she feels nauseous and-her mouth feels like \"paper\". Patient is somewhat confused but appears to have baseline dementia. Patient has COPD and per chart uses 2 L oxygen as needed. Patient came to the ER and was requested to be admitted to the hospital service. No further history could be obtained from the patient are to chart review. The patient denies any Headache, blurry vision, sore throat, trouble swallowing, trouble with speech, chest pain, SOB, cough, fever, chills, abd pain, urinary symptoms, constipation, recent travels, sick contacts, focal or generalized neurological symptoms,  falls, injuries, rashes, contact with COVID-19 diagnosed patients, melena, hemoptysis, hematuria, rashes, denies starting any new medications and denies any other concerns or problems besides as mentioned above. Review of Systems:  A comprehensive review of systems was negative except for that written in the History of Present Illness.      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 Authorizing Provider   clonazePAM (KlonoPIN) 0.5 mg tablet Take 0.5 mg by mouth nightly. Yes Provider, Historical   clonazePAM (KlonoPIN) 0.5 mg tablet Take 0.25 mg by mouth daily. Yes Provider, Historical   aspirin delayed-release (ECOTRIN LOW STRENGTH) 81 mg tablet Take 1 Tab by mouth daily. 1/17/17  Yes Sharee Ya MD   diclofenac (VOLTAREN) 1 % gel Apply  to affected area two (2) times daily as needed for Pain. 8/25/20   Sharee Ya MD   fluticasone furoate-vilanteroL (Breo Ellipta) 100-25 mcg/dose inhaler Take 1 Puff by inhalation daily as needed (SOB). Patient taking differently: Take 1 Puff by inhalation every eight (8) hours as needed (SOB). 5/26/20   Sharee Ya MD   [facility]) 10 mg tablet TAKE ONE TABLET BY MOUTH 2 TIMES A DAY 3/24/20   Sharee Ya MD   azelastine (OPTIVAR) 0.05 % ophthalmic solution Administer 1 Drop to right eye two (2) times daily as needed (allergy). Use in affected eye(s) 5/28/19   Sharee Ya MD   acetaminophen (TYLENOL) 500 mg tablet Take 1 Tab by mouth two (2) times daily as needed for Pain. 10/17/17   Sharee Ya MD   calcium citrate-vitamin d3 (CITRACAL+D) 315-200 mg-unit tab Take 1 Tab by mouth daily (with breakfast). Patient taking differently: Take 0.5 Tabs by mouth two (2) times daily (with meals).  11/22/16   Sharee Ya MD     Allergies   Allergen Reactions    Keflex [Cephalexin] Rash      Family History   Problem Relation Age of Onset    Hypertension Maternal Grandmother     Hypertension Maternal Grandfather     Hypertension Paternal Grandmother     Hypertension Paternal Grandfather     MS Daughter     No Known Problems Mother     No Known Problems Father         SOCIAL HISTORY:  Patient resides:  Independently    Assisted Living x   SNF    With family care       Smoking history:   None    Former x   Chronic      Alcohol history:   None    Social x   Chronic      Ambulates:   Independently    w/cane x   w/walker    w/wc    CODE STATUS:  DNR    Full x   Other      Objective:     Physical Exam:     Visit Vitals  /67   Pulse (!) 105   Temp 99.2 °F (37.3 °C)   Resp 21   SpO2 96%    O2 Flow Rate (L/min): 2 l/min O2 Device: Nasal cannula    General : alert x 2, pleasantly confused, female appears to be stated age  [de-identified]: PEERL, EOMI, moist mucus membrane, TM clear  Neck: supple, no JVD, no meningeal signs  Chest: Scattered wheezes bilateral lungs  CVS: S1 S2 heard, Capillary refill less than 2 seconds  Abd: soft/ Non tender, non distended, BS physiological,   Ext: no clubbing, no cyanosis, no edema, brisk 2+ DP pulses  Neuro/Psych: pleasant mood and affect, patient not participating in the exam, moves all 4, but strength could not be tested, cranial nerves II to XII and sensory could not be tested  Skin: warm      EKG: Sinus tachycardia, nonspecific ST changes. Data Review:     Recent Days:  Recent Labs     10/01/20  1029   WBC 8.4   HGB 12.6   HCT 39.6        Recent Labs     10/01/20  1029      K 4.5      CO2 24   *   BUN 46*   CREA 1.29*   CA 9.2   ALB 3.2*   ALT 14   INR 1.0     No results for input(s): PH, PCO2, PO2, HCO3, FIO2 in the last 72 hours.     24 Hour Results:  Recent Results (from the past 24 hour(s))   EKG, 12 LEAD, INITIAL    Collection Time: 10/01/20 10:21 AM   Result Value Ref Range    Ventricular Rate 101 BPM    Atrial Rate 101 BPM    P-R Interval 178 ms    QRS Duration 84 ms    Q-T Interval 364 ms    QTC Calculation (Bezet) 471 ms    Calculated P Axis 41 degrees    Calculated R Axis -7 degrees    Calculated T Axis 62 degrees    Diagnosis       Sinus tachycardia  Inferior infarct (cited on or before 11-MAR-2013)  Anterior infarct , age undetermined  When compared with ECG of 11-MAR-2013 13:12,  No significant change was found     SAMPLES BEING HELD    Collection Time: 10/01/20 10:29 AM   Result Value Ref Range    SAMPLES BEING HELD 1RED,1BLU     COMMENT        Add-on orders for these samples will be processed based on acceptable specimen integrity and analyte stability, which may vary by analyte. CBC WITH AUTOMATED DIFF    Collection Time: 10/01/20 10:29 AM   Result Value Ref Range    WBC 8.4 3.6 - 11.0 K/uL    RBC 4.31 3.80 - 5.20 M/uL    HGB 12.6 11.5 - 16.0 g/dL    HCT 39.6 35.0 - 47.0 %    MCV 91.9 80.0 - 99.0 FL    MCH 29.2 26.0 - 34.0 PG    MCHC 31.8 30.0 - 36.5 g/dL    RDW 16.4 (H) 11.5 - 14.5 %    PLATELET 997 364 - 434 K/uL    MPV 10.7 8.9 - 12.9 FL    NRBC 0.0 0  WBC    ABSOLUTE NRBC 0.00 0.00 - 0.01 K/uL    NEUTROPHILS 79 (H) 32 - 75 %    LYMPHOCYTES 12 12 - 49 %    MONOCYTES 7 5 - 13 %    EOSINOPHILS 1 0 - 7 %    BASOPHILS 1 0 - 1 %    IMMATURE GRANULOCYTES 0 0.0 - 0.5 %    ABS. NEUTROPHILS 6.7 1.8 - 8.0 K/UL    ABS. LYMPHOCYTES 1.1 0.8 - 3.5 K/UL    ABS. MONOCYTES 0.6 0.0 - 1.0 K/UL    ABS. EOSINOPHILS 0.1 0.0 - 0.4 K/UL    ABS. BASOPHILS 0.1 0.0 - 0.1 K/UL    ABS. IMM. GRANS. 0.0 0.00 - 0.04 K/UL    DF AUTOMATED     METABOLIC PANEL, COMPREHENSIVE    Collection Time: 10/01/20 10:29 AM   Result Value Ref Range    Sodium 138 136 - 145 mmol/L    Potassium 4.5 3.5 - 5.1 mmol/L    Chloride 107 97 - 108 mmol/L    CO2 24 21 - 32 mmol/L    Anion gap 7 5 - 15 mmol/L    Glucose 113 (H) 65 - 100 mg/dL    BUN 46 (H) 6 - 20 MG/DL    Creatinine 1.29 (H) 0.55 - 1.02 MG/DL    BUN/Creatinine ratio 36 (H) 12 - 20      GFR est AA 47 (L) >60 ml/min/1.73m2    GFR est non-AA 39 (L) >60 ml/min/1.73m2    Calcium 9.2 8.5 - 10.1 MG/DL    Bilirubin, total 0.5 0.2 - 1.0 MG/DL    ALT (SGPT) 14 12 - 78 U/L    AST (SGOT) 16 15 - 37 U/L    Alk.  phosphatase 86 45 - 117 U/L    Protein, total 7.6 6.4 - 8.2 g/dL    Albumin 3.2 (L) 3.5 - 5.0 g/dL    Globulin 4.4 (H) 2.0 - 4.0 g/dL    A-G Ratio 0.7 (L) 1.1 - 2.2     TYPE & SCREEN    Collection Time: 10/01/20 10:29 AM   Result Value Ref Range    Crossmatch Expiration 10/04/2020     ABO/Rh(D) Teressa Finer POSITIVE     Antibody screen NEG    PROTHROMBIN TIME + INR    Collection Time: 10/01/20 10:29 AM   Result Value Ref Range    INR 1.0 0.9 - 1.1      Prothrombin time 10.7 9.0 - 11.1 sec         Imaging:   No results found.   Assessment/Plan     GI bleed: Upper versus lower, patient will be observed on a telemetry bed, Protonix twice daily, IV hydration, n.p.o., GI consult, supportive care, close monitoring, reassess as needed, further intervention per hospital course, monitor H&H    COPD: Patient does not appear to be in exacerbation, albuterol as needed, ABG, supportive care and close monitoring    Acute kidney injury: Likely secondary above, IV hydration, avoid nephrotoxic medication, renally dose all medication, continue to monitor, reassess as needed, further intervention per hospital course    Chronic hypoxic respiratory failure: Continue home oxygen and continue monitor    GI DVT prophylaxis: Patient will be on SCDs                         Signed By: Tamy Matos MD     October 1, 2020

## 2020-10-02 LAB
ALBUMIN SERPL-MCNC: 2.7 G/DL (ref 3.5–5)
ALBUMIN/GLOB SERPL: 0.7 {RATIO} (ref 1.1–2.2)
ALP SERPL-CCNC: 74 U/L (ref 45–117)
ALT SERPL-CCNC: 11 U/L (ref 12–78)
ANION GAP SERPL CALC-SCNC: 7 MMOL/L (ref 5–15)
AST SERPL-CCNC: 15 U/L (ref 15–37)
BILIRUB SERPL-MCNC: 0.7 MG/DL (ref 0.2–1)
BUN SERPL-MCNC: 32 MG/DL (ref 6–20)
BUN/CREAT SERPL: 31 (ref 12–20)
CALCIUM SERPL-MCNC: 8.9 MG/DL (ref 8.5–10.1)
CHLORIDE SERPL-SCNC: 110 MMOL/L (ref 97–108)
CO2 SERPL-SCNC: 27 MMOL/L (ref 21–32)
COMMENT, HOLDF: NORMAL
CREAT SERPL-MCNC: 1.04 MG/DL (ref 0.55–1.02)
GLOBULIN SER CALC-MCNC: 3.7 G/DL (ref 2–4)
GLUCOSE SERPL-MCNC: 92 MG/DL (ref 65–100)
HGB BLD-MCNC: 10.5 G/DL (ref 11.5–16)
POTASSIUM SERPL-SCNC: 3.8 MMOL/L (ref 3.5–5.1)
PROT SERPL-MCNC: 6.4 G/DL (ref 6.4–8.2)
SAMPLES BEING HELD,HOLD: NORMAL
SODIUM SERPL-SCNC: 144 MMOL/L (ref 136–145)
TROPONIN I SERPL-MCNC: 0.09 NG/ML
TROPONIN I SERPL-MCNC: <0.05 NG/ML

## 2020-10-02 PROCEDURE — 84484 ASSAY OF TROPONIN QUANT: CPT

## 2020-10-02 PROCEDURE — 77010033678 HC OXYGEN DAILY

## 2020-10-02 PROCEDURE — 36415 COLL VENOUS BLD VENIPUNCTURE: CPT

## 2020-10-02 PROCEDURE — 99222 1ST HOSP IP/OBS MODERATE 55: CPT | Performed by: PHYSICIAN ASSISTANT

## 2020-10-02 PROCEDURE — C9113 INJ PANTOPRAZOLE SODIUM, VIA: HCPCS | Performed by: EMERGENCY MEDICINE

## 2020-10-02 PROCEDURE — 74011250637 HC RX REV CODE- 250/637: Performed by: FAMILY MEDICINE

## 2020-10-02 PROCEDURE — 94664 DEMO&/EVAL PT USE INHALER: CPT

## 2020-10-02 PROCEDURE — 94640 AIRWAY INHALATION TREATMENT: CPT

## 2020-10-02 PROCEDURE — 74011250636 HC RX REV CODE- 250/636: Performed by: FAMILY MEDICINE

## 2020-10-02 PROCEDURE — 74011000250 HC RX REV CODE- 250: Performed by: EMERGENCY MEDICINE

## 2020-10-02 PROCEDURE — 99218 HC RM OBSERVATION: CPT

## 2020-10-02 PROCEDURE — G0378 HOSPITAL OBSERVATION PER HR: HCPCS

## 2020-10-02 PROCEDURE — 85018 HEMOGLOBIN: CPT

## 2020-10-02 PROCEDURE — 80053 COMPREHEN METABOLIC PANEL: CPT

## 2020-10-02 PROCEDURE — 74011250637 HC RX REV CODE- 250/637: Performed by: NURSE PRACTITIONER

## 2020-10-02 PROCEDURE — 74011000250 HC RX REV CODE- 250: Performed by: FAMILY MEDICINE

## 2020-10-02 PROCEDURE — 96376 TX/PRO/DX INJ SAME DRUG ADON: CPT

## 2020-10-02 PROCEDURE — 74011250636 HC RX REV CODE- 250/636: Performed by: EMERGENCY MEDICINE

## 2020-10-02 RX ORDER — CLONAZEPAM 1 MG/1
0.5 TABLET ORAL
Status: DISCONTINUED | OUTPATIENT
Start: 2020-10-02 | End: 2020-10-05 | Stop reason: HOSPADM

## 2020-10-02 RX ORDER — MEMANTINE HYDROCHLORIDE 10 MG/1
10 TABLET ORAL 2 TIMES DAILY
Status: DISCONTINUED | OUTPATIENT
Start: 2020-10-02 | End: 2020-10-05 | Stop reason: HOSPADM

## 2020-10-02 RX ORDER — CLONAZEPAM 1 MG/1
0.25 TABLET ORAL DAILY
Status: DISCONTINUED | OUTPATIENT
Start: 2020-10-03 | End: 2020-10-05 | Stop reason: HOSPADM

## 2020-10-02 RX ORDER — ALBUTEROL SULFATE 0.83 MG/ML
2.5 SOLUTION RESPIRATORY (INHALATION)
Status: DISCONTINUED | OUTPATIENT
Start: 2020-10-02 | End: 2020-10-03

## 2020-10-02 RX ADMIN — MEMANTINE HYDROCHLORIDE 10 MG: 10 TABLET ORAL at 17:32

## 2020-10-02 RX ADMIN — SODIUM CHLORIDE 75 ML/HR: 900 INJECTION, SOLUTION INTRAVENOUS at 12:00

## 2020-10-02 RX ADMIN — ALBUTEROL SULFATE 2 PUFF: 90 AEROSOL, METERED RESPIRATORY (INHALATION) at 08:00

## 2020-10-02 RX ADMIN — Medication 10 ML: at 06:16

## 2020-10-02 RX ADMIN — CLONAZEPAM 0.5 MG: 1 TABLET ORAL at 22:36

## 2020-10-02 RX ADMIN — ALBUTEROL SULFATE 2 PUFF: 90 AEROSOL, METERED RESPIRATORY (INHALATION) at 03:46

## 2020-10-02 RX ADMIN — ALBUTEROL SULFATE 2 PUFF: 90 AEROSOL, METERED RESPIRATORY (INHALATION) at 12:00

## 2020-10-02 RX ADMIN — SODIUM CHLORIDE 40 MG: 9 INJECTION, SOLUTION INTRAMUSCULAR; INTRAVENOUS; SUBCUTANEOUS at 11:55

## 2020-10-02 RX ADMIN — Medication 10 ML: at 14:00

## 2020-10-02 RX ADMIN — ALBUTEROL SULFATE 2.5 MG: 2.5 SOLUTION RESPIRATORY (INHALATION) at 23:57

## 2020-10-02 RX ADMIN — SODIUM CHLORIDE 40 MG: 9 INJECTION, SOLUTION INTRAMUSCULAR; INTRAVENOUS; SUBCUTANEOUS at 22:36

## 2020-10-02 RX ADMIN — ALBUTEROL SULFATE 2 PUFF: 90 AEROSOL, METERED RESPIRATORY (INHALATION) at 16:15

## 2020-10-02 NOTE — PROGRESS NOTES
Problem: Falls - Risk of  Goal: *Absence of Falls  Description: Document Bard Mercedes Fall Risk and appropriate interventions in the flowsheet. Outcome: Progressing Towards Goal  Note: Fall Risk Interventions:  Mobility Interventions: Communicate number of staff needed for ambulation/transfer, Patient to call before getting OOB    Mentation Interventions: Adequate sleep, hydration, pain control, Evaluate medications/consider consulting pharmacy, Door open when patient unattended, Reorient patient, More frequent rounding, Room close to nurse's station, Update white board, Toileting rounds    Medication Interventions: Evaluate medications/consider consulting pharmacy, Patient to call before getting OOB, Teach patient to arise slowly    Elimination Interventions: Call light in reach              Problem: Pressure Injury - Risk of  Goal: *Prevention of pressure injury  Description: Document Alejandro Scale and appropriate interventions in the flowsheet. Outcome: Progressing Towards Goal  Note: Pressure Injury Interventions:  Sensory Interventions: Assess changes in LOC, Check visual cues for pain, Discuss PT/OT consult with provider, Float heels, Keep linens dry and wrinkle-free, Maintain/enhance activity level, Minimize linen layers, Pressure redistribution bed/mattress (bed type), Turn and reposition approx. every two hours (pillows and wedges if needed)    Moisture Interventions: Absorbent underpads, Internal/External urinary devices, Limit adult briefs, Maintain skin hydration (lotion/cream), Minimize layers, Check for incontinence Q2 hours and as needed, Apply protective barrier, creams and emollients    Activity Interventions: Increase time out of bed, Pressure redistribution bed/mattress(bed type)    Mobility Interventions: HOB 30 degrees or less, Pressure redistribution bed/mattress (bed type), Turn and reposition approx.  every two hours(pillow and wedges)    Nutrition Interventions: Document food/fluid/supplement intake    Friction and Shear Interventions: HOB 30 degrees or less, Lift sheet, Minimize layers                Problem: General Medical Care Plan  Goal: *Vital signs within specified parameters  Outcome: Progressing Towards Goal  Goal: *Labs within defined limits  Outcome: Progressing Towards Goal

## 2020-10-02 NOTE — ACP (ADVANCE CARE PLANNING)
Advance Care Planning     Advance Care Planning Activator (Inpatient)  Conversation Note      Date of ACP Conversation: 10/02/20     Conversation Conducted with:   Patient with Decision Making Capacity    ACP Activator: Rica Sánchez RN    *When Decision Maker makes decisions on behalf of the incapacitated patient: Decision Maker is asked to consider and make decisions based on patient values, known preferences, or best interests. Health Care Decision Maker:    Current Designated Health Care Decision Maker:   Primary Decision Maker: Eve Harrington - Spouse - [phone]    Secondary Decision Maker: Dennys Bradford - Daughter - [phone]  (If there is a [address] named in the [address] [employer]\" box in the ACP activity, but it is not visible above, be sure to open that field and then select the health care decision maker relationship (ie \"primary\") in the blank space to the right of the name.) Validate  this information as still accurate & up-to-date; edit [address] field as needed.)    Note: Assess and validate information in current ACP documents, as indicated. If no Decision Maker listed above or available through scanned documents, then:    If no Authorized Decision Maker has previously been identified, then patient chooses [address]:  \"Who would you like to name as your primary health care decision-maker? \"    Name: Watson Maya   Relationship:  Phone number: [phone]  Waluzi this person be reached easily? \" YES  \"Who would you like to name as your back-up decision maker? \"   Name: Aston Paty  Relationship: child Phone number: [phone]  Waluzi this person be reached easily? \" YES    Care Preferences    Ventilation:   \"If you were in your present state of health and suddenly became very ill and were unable to breathe on your own, what would your preference be about the use of a ventilator (breathing machine) if it were available to you? \"      If patient would desire the use of a ventilator (breathing machine), answer \"yes\", if not \"no\":YES    \"If your health worsens and it becomes clear that your chance of recovery is unlikely, what would your preference be about the use of a ventilator (breathing machine) if it were available to you? \"     Would the patient desire the use of a ventilator (breathing machine)? YES      Resuscitation  \"CPR works best to restart the heart when there is a sudden event, like a heart attack, in someone who is otherwise healthy. Unfortunately, CPR does not typically restart the heart for people who have serious health conditions or who are very sick. \"    \"In the event your heart stopped as a result of an underlying serious health condition, would you want attempts to be made to restart your heart (answer \"yes\" for attempt to resuscitate) or would you prefer a natural death (answer \"no\" for do not attempt to resuscitate)? \" no      [x] Yes  [] No   Educated Patient / Tia Getting regarding differences between Advance Directives and portable DNR orders.     Length of ACP Conversation in minutes:  10    Conversation Outcomes:  [x] ACP discussion completed  [x] Existing advance directive reviewed with patient; no changes to patient's previously recorded wishes     [] New Advance Directive completed   [] Portable Do Not Resuscitate prepared for Provider review and signature  [] POLST/POST/MOLST/MOST prepared for Provider review and signature      Follow-up plan:    [] Schedule follow-up conversation to continue planning  [] Referred individual to Provider for additional questions/concerns   [] Advised patient/agent/surrogate to review completed ACP document and update if needed with changes in condition, patient preferences or care setting     [] This note routed to one or more involved healthcare providers

## 2020-10-02 NOTE — PROGRESS NOTES
0400: Notified hospitalist NP Tony August about pts troponin trending up. Suggested to continue to trend troponin's with likelihood of cardiology consults for the morning. 0730: Bedside and Verbal shift change report given to Nolan Gallo (oncoming nurse) by Jennifer Frye and Tad Pool RN (offgoing nurse). Report included the following information SBAR, Kardex, ED Summary, Intake/Output, MAR, Recent Results and Cardiac Rhythm NS/ST. Problem: Falls - Risk of  Goal: *Absence of Falls  Description: Document Cuauhtemoc Olivas Fall Risk and appropriate interventions in the flowsheet. Outcome: Progressing Towards Goal  Note: Fall Risk Interventions:  Mobility Interventions: Bed/chair exit alarm, Communicate number of staff needed for ambulation/transfer, Patient to call before getting OOB    Mentation Interventions: Adequate sleep, hydration, pain control, Bed/chair exit alarm, Reorient patient, Room close to nurse's station    Medication Interventions: Teach patient to arise slowly, Patient to call before getting OOB, Bed/chair exit alarm    Elimination Interventions: Bed/chair exit alarm, Call light in reach, Patient to call for help with toileting needs, Toileting schedule/hourly rounds. Problem: General Medical Care Plan  Goal: *Vital signs within specified parameters  Outcome: Progressing Towards Goal  Goal: *Absence of infection signs and symptoms  Outcome: Progressing Towards Goal  Pt COVID-19 test came back negative. Droplet plus/ airborne precautions have been discontinued.    Goal: *Optimal pain control at patient's stated goal  Outcome: Progressing Towards Goal  Pt has no c/o of pain or n/v.   Goal: *Skin integrity maintained  Outcome: Progressing Towards Goal

## 2020-10-02 NOTE — PROGRESS NOTES
[facility] Adult  Hospitalist Group                                                                                          Hospitalist Progress Note  Zo Nicholson NP  Answering service: [phone] from in house phone        Date of Service:  10/2/2020  NAME:  Willie Au  :  10/21/1930  MRN:  [id number]      Admission Summary: This is a 80 y.o. female with a PMH CVA with Cerenral Thrombosis, HECTOR, HLD, Debility, Alzheimer's, COPD- uses O2 at 2l as needed and Chronic Respiratory Failure who presents with GI bleed, she came to the ER via EMS because she had 2 episodes of coffee-ground emesis and one episode of blood in her stool per the facility she came from- lives at [address] [place] [address]. Patient reports that she feels nauseous and-her mouth feels like \"paper\". Patient is somewhat confused with baseline dementia. Patient came to the ER and was requested to be admitted to the hospital service. The patient denies any Headache, blurry vision, sore throat, trouble swallowing, trouble with speech, chest pain, SOB, cough, fever, chills, abd pain, urinary symptoms, constipation, recent travels, sick contacts, focal or generalized neurological symptoms,  falls, injuries, rashes, contact with COVID-19 diagnosed patients, melena, hemoptysis, hematuria, rashes, denies starting any new medications and denies any other concerns or problems besides as mentioned above. Interval history / Subjective: Follow-up for GIB, COPD, SANIYA, Chronic Hypoxic Respiratory Failure and Elevated troponin.   -Patient seen and examined, no c/o's, Message left for daughter Saúl Contreras to update on patient stable status and to discuss ACP.        Assessment & Plan:     GI bleed: Upper versus lower, coffee-ground emesis, melena; hgb 12.6-->10.5, INR 1.  -telemetry bed,   -Protonix twice daily  -IV hydration  -NPO   -GI consult  -monitor Hgb, transfuse to keep Hgb >7.0    Elevated troponin: peaked at 0.09  -EKG  -ECHO  -Cardiology consult     COPD: Patient does not appear to be in exacerbation.  -albuterol as needed  -ABG     Acute kidney injury: Likely secondary above  -IV hydration   -avoid nephrotoxic medication  -renally dose all medication     Chronic hypoxic respiratory failure: Continue home oxygen and continue monitor  Alzheimer's: continue home meds. DVTppx: SCDs  Gippx: Protonix  Code Status: Full Code  Diet: Cardiac  Activity: OOB to chair TID and PRN  Discharge: TBD       Hospital Problems  Date Reviewed: 11/11/2019          Codes Class Noted POA    GI bleed ICD-10-CM: K92.2  ICD-9-CM: 578.9  10/1/2020 Unknown                Review of Systems:   A comprehensive review of systems was negative except for that written in the HPI. Vital Signs:    Last 24hrs VS reviewed since prior progress note. Most recent are:  Visit Vitals  /66 (BP 1 Location: Right arm, BP Patient Position: At rest)   Pulse 82   Temp 97.9 °F (36.6 °C)   Resp 24   Wt 76.3 kg (168 lb 3.2 oz)   SpO2 91%   BMI 30.76 kg/m²         Intake/Output Summary (Last 24 hours) at 10/2/2020 7918  Last data filed at 10/2/2020 5092  Gross per 24 hour   Intake 1000 ml   Output 250 ml   Net 750 ml        Physical Examination:         Constitutional:  No acute distress, cooperative, pleasant    ENT:  Oral mucosa moist.    Resp:  CTA bilaterally. No wheezing/rhonchi/rales. No accessory muscle use. CV:  Regular rhythm, normal rate, no murmurs, gallops, rubs. GI:  Soft, non distended, non tender, no guarding, BS present. Musculoskeletal:  No edema, warm, 2+ pulses throughout. Neurologic:  Moves all extremities. AAOx3, CN II-XII reviewed. Skin:  Good turgor, no rashes or ulcers  Psych:  Poor insight, Not anxious nor agitated.           Data Review:    Review and/or order of clinical lab test      Labs:     Recent Labs     10/02/20  0045 10/01/20  1906 10/01/20  1029   WBC  --   --  8.4   HGB 10.5* 11.1* 12.6   HCT  --   --  39.6 PLT  --   --  190     Recent Labs     10/02/20  0045 10/01/20  1029    138   K 3.8 4.5   * 107   CO2 27 24   BUN 32* 46*   CREA 1.04* 1.29*   GLU 92 113*   CA 8.9 9.2     Recent Labs     10/02/20  0045 10/01/20  1029   ALT 11* 14   AP 74 86   TBILI 0.7 0.5   TP 6.4 7.6   ALB 2.7* 3.2*   GLOB 3.7 4.4*     Recent Labs     10/01/20  1029   INR 1.0   PTP 10.7      No results for input(s): FE, TIBC, PSAT, FERR in the last 72 hours. No results found for: FOL, RBCF   No results for input(s): PH, PCO2, PO2 in the last 72 hours.   Recent Labs     10/02/20  0045 10/01/20  1906   TROIQ 0.09* 0.07*     Lab Results   Component Value Date/Time    Cholesterol, total 223 (H) 01/21/2016 04:38 PM    HDL Cholesterol 78 01/21/2016 04:38 PM    LDL,Direct 107 (H) 05/26/2020 01:33 PM    LDL, calculated 131 (H) 01/21/2016 04:38 PM    Triglyceride 70 01/21/2016 04:38 PM    CHOL/HDL Ratio 2.8 06/17/2011 04:30 AM     No results found for: [facility]  Lab Results   Component Value Date/Time    Color YELLOW/STRAW 11/12/2016 01:11 PM    Appearance CLOUDY (A) 11/12/2016 01:11 PM    Specific gravity 1.020 11/12/2016 01:11 PM    pH (UA) 5.5 11/12/2016 01:11 PM    Protein TRACE (A) 11/12/2016 01:11 PM    Glucose NEGATIVE  11/12/2016 01:11 PM    Ketone TRACE (A) 11/12/2016 01:11 PM    Bilirubin NEGATIVE  11/12/2016 01:11 PM    Urobilinogen 1.0 11/12/2016 01:11 PM    Nitrites POSITIVE (A) 11/12/2016 01:11 PM    Leukocyte Esterase SMALL (A) 11/12/2016 01:11 PM    Epithelial cells FEW 11/12/2016 01:11 PM    Bacteria 4+ (A) 11/12/2016 01:11 PM    WBC 20-50 11/12/2016 01:11 PM    RBC 0-5 11/12/2016 01:11 PM         Medications Reviewed:     Current Facility-Administered Medications   Medication Dose Route Frequency    sodium chloride (NS) flush 5-40 mL  5-40 mL IntraVENous PRN    ondansetron (ZOFRAN) injection 4 mg  4 mg IntraVENous Q4H PRN    sodium chloride (NS) flush 5-40 mL  5-40 mL IntraVENous Q8H    sodium chloride (NS) flush 5-40 mL 5-40 mL IntraVENous PRN    0.9% sodium chloride infusion  75 mL/hr IntraVENous CONTINUOUS    albuterol (PROVENTIL HFA, VENTOLIN HFA, PROAIR HFA) inhaler 2 Puff  2 Puff Inhalation Q4H RT    pantoprazole (PROTONIX) 40 mg in 0.9% sodium chloride 10 mL injection  40 mg IntraVENous Q12H     ______________________________________________________________________  EXPECTED LENGTH OF STAY: - - -  ACTUAL LENGTH OF STAY:          0                 Carmen Cordoba NP

## 2020-10-02 NOTE — PROGRESS NOTES
[address]  [address], [address]       GI PROGRESS NOTE  Wilfred Rawls, [facility] office  [phone] NP in-hospital cell phone M-F until 4:30  After 5pm or on weekends, please call  for physician on call      NAME: Inder Rashid   :  10/21/1930   MRN:  [id number]       Subjective:   Patient denies nausea or abdominal pain, no bowel movements. No signs of GI blood loss. Objective:     VITALS:   Last 24hrs VS reviewed since prior progress note. Most recent are:  Visit Vitals  /66 (BP 1 Location: Right arm, BP Patient Position: At rest)   Pulse 82   Temp 97.9 °F (36.6 °C)   Resp 24   Wt 76.3 kg (168 lb 3.2 oz)   SpO2 91%   BMI 30.76 kg/m²       PHYSICAL EXAM:  General: Cooperative, no acute distress    Neurologic:  Alert and oriented X 3. HEENT: EOMI, no scleral icterus   Lungs:  diminished  Heart:  S1 S2    Abdomen: Soft, non-distended, no tenderness. +Bowel sounds  Extremities: warm  Psych:   Fair insight. Not anxious or agitated. Lab Data Reviewed:     Recent Results (from the past 24 hour(s))   EKG, 12 LEAD, INITIAL    Collection Time: 10/01/20 10:21 AM   Result Value Ref Range    Ventricular Rate 104 BPM    Atrial Rate 104 BPM    P-R Interval 180 ms    QRS Duration 82 ms    Q-T Interval 360 ms    QTC Calculation (Bezet) 473 ms    Calculated P Axis 42 degrees    Calculated R Axis -9 degrees    Calculated T Axis 58 degrees    Diagnosis       Sinus tachycardia  Inferior infarct (cited on or before 11-MAR-2013)  When compared with ECG of 11-MAR-2013 13:12,  No significant change was found  Confirmed by Ese Bennett MD, Preet Glez ([id number]) on 10/1/2020 11:15:57 PM     SAMPLES BEING HELD    Collection Time: 10/01/20 10:29 AM   Result Value Ref Range    SAMPLES BEING HELD 1RED,1BLU     COMMENT        Add-on orders for these samples will be processed based on acceptable specimen integrity and analyte stability, which may vary by analyte.    CBC WITH AUTOMATED DIFF    Collection Time: 10/01/20 10:29 AM   Result Value Ref Range    WBC 8.4 3.6 - 11.0 K/uL    RBC 4.31 3.80 - 5.20 M/uL    HGB 12.6 11.5 - 16.0 g/dL    HCT 39.6 35.0 - 47.0 %    MCV 91.9 80.0 - 99.0 FL    MCH 29.2 26.0 - 34.0 PG    MCHC 31.8 30.0 - 36.5 g/dL    RDW 16.4 (H) 11.5 - 14.5 %    PLATELET 244 996 - 366 K/uL    MPV 10.7 8.9 - 12.9 FL    NRBC 0.0 0  WBC    ABSOLUTE NRBC 0.00 0.00 - 0.01 K/uL    NEUTROPHILS 79 (H) 32 - 75 %    LYMPHOCYTES 12 12 - 49 %    MONOCYTES 7 5 - 13 %    EOSINOPHILS 1 0 - 7 %    BASOPHILS 1 0 - 1 %    IMMATURE GRANULOCYTES 0 0.0 - 0.5 %    ABS. NEUTROPHILS 6.7 1.8 - 8.0 K/UL    ABS. LYMPHOCYTES 1.1 0.8 - 3.5 K/UL    ABS. MONOCYTES 0.6 0.0 - 1.0 K/UL    ABS. EOSINOPHILS 0.1 0.0 - 0.4 K/UL    ABS. BASOPHILS 0.1 0.0 - 0.1 K/UL    ABS. IMM. GRANS. 0.0 0.00 - 0.04 K/UL    DF AUTOMATED     METABOLIC PANEL, COMPREHENSIVE    Collection Time: 10/01/20 10:29 AM   Result Value Ref Range    Sodium 138 136 - 145 mmol/L    Potassium 4.5 3.5 - 5.1 mmol/L    Chloride 107 97 - 108 mmol/L    CO2 24 21 - 32 mmol/L    Anion gap 7 5 - 15 mmol/L    Glucose 113 (H) 65 - 100 mg/dL    BUN 46 (H) 6 - 20 MG/DL    Creatinine 1.29 (H) 0.55 - 1.02 MG/DL    BUN/Creatinine ratio 36 (H) 12 - 20      GFR est AA 47 (L) >60 ml/min/1.73m2    GFR est non-AA 39 (L) >60 ml/min/1.73m2    Calcium 9.2 8.5 - 10.1 MG/DL    Bilirubin, total 0.5 0.2 - 1.0 MG/DL    ALT (SGPT) 14 12 - 78 U/L    AST (SGOT) 16 15 - 37 U/L    Alk.  phosphatase 86 45 - 117 U/L    Protein, total 7.6 6.4 - 8.2 g/dL    Albumin 3.2 (L) 3.5 - 5.0 g/dL    Globulin 4.4 (H) 2.0 - 4.0 g/dL    A-G Ratio 0.7 (L) 1.1 - 2.2     TYPE & SCREEN    Collection Time: 10/01/20 10:29 AM   Result Value Ref Range    Crossmatch Expiration 10/04/2020     ABO/Rh(D) [place] POSITIVE     Antibody screen NEG    PROTHROMBIN TIME + INR    Collection Time: 10/01/20 10:29 AM   Result Value Ref Range    INR 1.0 0.9 - 1.1      Prothrombin time 10.7 9.0 - 11.1 sec SARS-COV-2    Collection Time: 10/01/20  2:56 PM   Result Value Ref Range    Specimen source Nasopharyngeal      SARS-CoV-2 Not detected NOTD      Specimen source Nasopharyngeal      Specimen type NP Swab      Health status Symptomatic Testing     POC EG7    Collection Time: 10/01/20  6:00 PM   Result Value Ref Range    Calcium, ionized (POC) 1.28 1.12 - 1.32 mmol/L    pH (POC) 7.34 (L) 7.35 - 7.45      pCO2 (POC) 48.4 (H) 35.0 - 45.0 MMHG    pO2 (POC) 97 80 - 100 MMHG    HCO3 (POC) 26.3 (H) 22 - 26 MMOL/L    Base excess (POC) 1 mmol/L    sO2 (POC) 97 92 - 97 %    Site RIGHT BRACHIAL      Device: NASAL CANNULA      Flow rate (POC) 3 L/M    Allens test (POC) YES      Specimen type (POC) ARTERIAL      Total resp. rate 20     TROPONIN I    Collection Time: 10/01/20  7:06 PM   Result Value Ref Range    Troponin-I, Qt. 0.07 (H) <0.05 ng/mL   HEMOGLOBIN    Collection Time: 10/01/20  7:06 PM   Result Value Ref Range    HGB 11.1 (L) 11.5 - 16.0 g/dL   HEMOGLOBIN    Collection Time: 10/02/20 12:45 AM   Result Value Ref Range    HGB 10.5 (L) 11.5 - 16.0 g/dL   SAMPLES BEING HELD    Collection Time: 10/02/20 12:45 AM   Result Value Ref Range    SAMPLES BEING HELD 1SST     COMMENT        Add-on orders for these samples will be processed based on acceptable specimen integrity and analyte stability, which may vary by analyte. METABOLIC PANEL, COMPREHENSIVE    Collection Time: 10/02/20 12:45 AM   Result Value Ref Range    Sodium 144 136 - 145 mmol/L    Potassium 3.8 3.5 - 5.1 mmol/L    Chloride 110 (H) 97 - 108 mmol/L    CO2 27 21 - 32 mmol/L    Anion gap 7 5 - 15 mmol/L    Glucose 92 65 - 100 mg/dL    BUN 32 (H) 6 - 20 MG/DL    Creatinine 1.04 (H) 0.55 - 1.02 MG/DL    BUN/Creatinine ratio 31 (H) 12 - 20      GFR est AA >60 >60 ml/min/1.73m2    GFR est non-AA 50 (L) >60 ml/min/1.73m2    Calcium 8.9 8.5 - 10.1 MG/DL    Bilirubin, total 0.7 0.2 - 1.0 MG/DL    ALT (SGPT) 11 (L) 12 - 78 U/L    AST (SGOT) 15 15 - 37 U/L    Alk. phosphatase 74 45 - 117 U/L    Protein, total 6.4 6.4 - 8.2 g/dL    Albumin 2.7 (L) 3.5 - 5.0 g/dL    Globulin 3.7 2.0 - 4.0 g/dL    A-G Ratio 0.7 (L) 1.1 - 2.2     TROPONIN I    Collection Time: 10/02/20 12:45 AM   Result Value Ref Range    Troponin-I, Qt. 0.09 (H) <0.05 ng/mL            Assessment:   · GI bleed: coffee-ground emesis, melena; hgb 12.6-->10.5, INR 1  · Raising troponin: 0.09  · COVID negative  · COPD      Patient Active Problem List   Diagnosis Code    Cerebral thrombosis with cerebral infarction ([facility]) I63.30    COPD (chronic obstructive pulmonary disease) ([facility]) J44.9    Chronic respiratory failure with hypoxia ([facility]) J96.11    Sinus tachycardia R00.0    S/P knee replacement Z96.659    Obstructive sleep apnea G47.33    Dyslipidemia E78.5    Debility R53.81    Nausea and vomiting R11.2    UTI (urinary tract infection) N39.0    Hereditary chorea (HCC) G10    Late onset Alzheimer's disease without behavioral disturbance ([facility]) G30.1, F02.80    GI bleed K92.2     Plan:   · BID PPI  · Monitor CBC  · CLD     Signed By: Omari Valenzuela NP     10/2/2020  9:15 AM         Agree with above  No need for EGD  Advanced diet to full liquid, to advance in am as tolerated  Will follow as needed this weekend

## 2020-10-02 NOTE — CONSULTS
Cardiology Consult Note      Patient Name: Malathi Ly  : 10/21/1930 MRN: [id number]  Date: 10/2/2020  Time: 4:16 PM    Admit Diagnosis: GI bleed [K92.2]    Primary Cardiologist: none    Consulting Cardiologist: Jose Angel Mckeon MD    Reason for Consult: troponin elevation    Requesting MD: Chrissie Means NP    HPI:  Malathi Ly is a 80 y.o. female admitted on 10/1/2020  for GI bleed [K92.2]. has a past medical history of Arthritis, COPD, GERD (gastroesophageal reflux disease), OTHER MEDICAL, On home oxygen therapy, Stroke ([facility] [facility] (), and Unspecified sleep apnea. Presented to the ED via EMS for coffee- ground emesis and blood in her stool, per report. She herself is a poor historian. She states she vomited rice and her stools are light in color. Per usual ED routing, troponin was checked. No complaints for CP or SOB. No cardiac history as stated by her or by review of chart. Initial troponin in ED 0.07. H/o COPD, CVA, GERD and OA. Subjective:  Pleasant. No c/o CP or SOB. I feel fine. She wants to know why she didn't have her surgery yet. She does know she is in [address] [place] [address] 9     1. Troponin elevation   - trivial - 0.07 --> 0.09 --> 0.05   - EKG without evidence for ACS   - Asymptomatic - no c/o CP or SOB   - Nuc stress  normal   - non specific in this setting of SANIYA and chronic respiratory failure. 2. GIB   - GI following   - PPI BID and follow H/H  3. COPD   - on chronic home O2   - per Primary team  4. SANIYA   - suspect dehydration   - improved with IVF  5. Alzheimer's   - baseline   - continue meds    Trivial troponin elevation, asymptomatic and no EKG changes. Minimal demand related to SANIYA and chronic respiratory failure. Troponin now normal.  She needs no further cardiac testing at this time. Ok to discharge home when ready, from Cardiology standpoint. Will see again as needed. Patient Active Problem List   Diagnosis Code    Cerebral thrombosis with cerebral infarction ([facility] I63.30    COPD (chronic obstructive pulmonary disease) ([facility]) J44.9    Chronic respiratory failure with hypoxia ([facility]) J96.11    Sinus tachycardia R00.0    S/P knee replacement Z96.659    Obstructive sleep apnea G47.33    Dyslipidemia E78.5    Debility R53.81    Nausea and vomiting R11.2    UTI (urinary tract infection) N39.0    Hereditary chorea ([facility] G10    Late onset Alzheimer's disease without behavioral disturbance ([facility]) G30.1, F02.80    GI bleed K92.2     No specialty comments available. Review of Systems:    [] Patient unable to provide secondary to condition    [x] All systems negative, except as checked below.   Constitutional:    []Weight Change  []Fever   []Chills   []Night Sweats  []Fatigue  []Malaise  []____  ENT/Mouth:     []Hearing Changes  []Ear Pain  []Nasal Congestion   []Sinus Pain  []Hoarseness   []Sore throat  []Rhinorrhea  []Swallowing Difficulty  []____  Eyes:    []Eye Pain  []Swelling  []Redness  []Foreign Body  []Discharge  []Vision Changes  []____  Cardiovascular:    []Chest Pain  []SOB  []PND  []EAST  []Orthopnea  []Claudication  []Edema   []Palpitations  []____  Respiratory:    []Cough  []Sputum  []Wheezing,  []SOB  []Hemoptysis  []____  Gastrointestinal:    []Nausea  []Vomiting  []Diarrhea  []Constipation  [x]Pain  []Heartburn  []Anorexia  []Dysphagia  []Hematochezia  []Melena,  []Jaundice  []____  Genitourinary:    []Dysuria  []Urinary Frequency  []Hematuria  []Urinary Incontinence  []Urgency  []Flank Pain  []Hesitancy  []____  Musculoskeletal:    []Arthralgias  []Myalgias  []Joint Swelling  []Joint Stiffness  []Back Pain  []Neck Pain  []____  Skin:    []Skin Lesions  []Pruritis  []Hair Changes  []Skin rashes  []____  Neuro:    []Weakness  []Numbness  []Paresthesias  []Loss of Consciousness  []Syncope   []Dizziness  []Headache  []Coordination Changes  []Recent Falls []____  Psych:    []Anxiety/Depression  []Insomnia  []Memory Changes  []Violence/Abuse Hx.  []____  Heme/Lymph:    []Bruising  []Bleeding  []Lymphadenopathy  []____  Endocrine:    []Polyuria  []Polydipsia  []Temperature Intolerance  []____         Previous treatment/evaluation includes   Nuclear stress test  Cardiac risk factors:   obesity, sedentary life style, post-menopausal.    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,TIA    Unspecified sleep apnea     NO CPAP     Past Surgical History:   Procedure Laterality Date    ABDOMEN SURGERY PROC UNLISTED      hernia repair    HX GYN  1963    c section    HX HEENT      status post T&A    HX OOPHORECTOMY      HX ORTHOPAEDIC      status post left total knee replacement     Current Facility-Administered Medications   Medication Dose Route Frequency    memantine (NAMENDA) tablet 10 mg  10 mg Oral BID    clonazePAM (KlonoPIN) tablet 0.5 mg  0.5 mg Oral QHS    [START ON 10/3/2020] clonazePAM (KlonoPIN) tablet 0.25 mg  0.25 mg Oral DAILY    sodium chloride (NS) flush 5-40 mL  5-40 mL IntraVENous PRN    ondansetron (ZOFRAN) injection 4 mg  4 mg IntraVENous Q4H PRN    sodium chloride (NS) flush 5-40 mL  5-40 mL IntraVENous Q8H    sodium chloride (NS) flush 5-40 mL  5-40 mL IntraVENous PRN    0.9% sodium chloride infusion  75 mL/hr IntraVENous CONTINUOUS    albuterol (PROVENTIL HFA, VENTOLIN HFA, PROAIR HFA) inhaler 2 Puff  2 Puff Inhalation Q4H RT    pantoprazole (PROTONIX) 40 mg in 0.9% sodium chloride 10 mL injection  40 mg IntraVENous Q12H       Allergies   Allergen Reactions    Keflex [Cephalexin] Rash      Family History   Problem Relation Age of Onset    Hypertension Maternal Grandmother     Hypertension Maternal Grandfather     Hypertension Paternal Grandmother     Hypertension Paternal Grandfather     MS Daughter     No Known Problems Mother     No Known Problems Father       Social History     Socioeconomic History    Marital status:      Spouse name: Not on file    Number of children: Not on file    Years of education: Not on file    Highest education level: Not on file   Tobacco Use    Smoking status: Former Smoker     Packs/day: 1.00     Years: 40.00     Pack years: 40.00     Last attempt to quit: 1982     Years since quittin.3    Smokeless tobacco: Never Used   Substance and Sexual Activity    Alcohol use: No    Drug use: No    Sexual activity: Yes     Partners: Male     Comment: ,2 daughters. retired. 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 Neck:   Supple, no carotid bruit and no JVD. Back:     Symmetric, normal curvature. Lungs:     Rales in left base to auscultation bilaterally. Heart[de-identified]    Regular rate and rhythm, S1, S2 normal, no click, rub or gallop. 2/6 REBECCA   Abdomen:     Soft, non-tender. Bowel sounds normal.    Extremities:   Extremities normal, atraumatic, no cyanosis or edema. Vascular:   Pulses - 2+ radials   Skin:   Skin color normal. No rashes or lesions   Neurologic:   CN II-XII grossly intact.         Telemetry: normal sinus rhythm    ECG:   EKG Results     Procedure 720 Value Units Date/Time    EKG, 12 LEAD, INITIAL [[id number]] Collected:  10/01/20 1021    Order Status:  Completed Updated:  10/01/20 4886     Ventricular Rate 104 BPM      Atrial Rate 104 BPM      P-R Interval 180 ms      QRS Duration 82 ms      Q-T Interval 360 ms      QTC Calculation (Bezet) 473 ms      Calculated P Axis 42 degrees      Calculated R Axis -9 degrees      Calculated T Axis 58 degrees      Diagnosis --     Sinus tachycardia  Inferior infarct (cited on or before 11-MAR-2013)  When compared with ECG of 11-MAR-2013 13:12,  No significant change was found  Confirmed by Leslye Lemus MD, Lina Whyte ([id number]) on 10/1/2020 11:15:57 PM              Data Review:     Radiology:   XR Results (most recent):  Results from [place] encounter on 07/21/20   XR CHEST PA LAT    Narrative EXAM: XR CHEST PA LAT    INDICATION: fall onto left side, borderline low o2 saturations    COMPARISON: Chest x-ray 3/29/2013. Yuni Kelsey FINDINGS: PA and lateral radiographs of the chest demonstrate clear lungs. There  is atherosclerotic calcific age and of the thoracic aorta. The cardiac and  mediastinal contours and pulmonary vascularity are normal. The bones and soft  tissues show degenerative spine change and osteoarthritic changes of the  shoulders and otherwise are within normal limits. Impression IMPRESSION: No acute findings. Recent Labs     10/02/20  0945 10/02/20  0045 10/01/20  1906   TROIQ <0.05 0.09* 0.07*     Recent Labs     10/02/20  0045 10/01/20  1029    138   K 3.8 4.5   * 107   CO2 27 24   BUN 32* 46*   CREA 1.04* 1.29*   GLU 92 113*   CA 8.9 9.2     Recent Labs     10/02/20  0045 10/01/20  1906 10/01/20  1029   WBC  --   --  8.4   HGB 10.5* 11.1* 12.6   HCT  --   --  39.6   PLT  --   --  190     Recent Labs     10/02/20  0045 10/01/20  1029   PTP  --  10.7   INR  --  1.0   AP 74 86     No results for input(s): CHOL, LDLC in the last 72 hours. No lab exists for component: TGL, HDLC,  HBA1C  No results for input(s): CRP, TSH, TSHEXT in the last 72 hours. No lab exists for component: ESR    Dariana Mcdonald MD         [facility] [address] [place] [address] [address] [address]     [address]     [phone]    CC:Young Conrad MD

## 2020-10-02 NOTE — PROGRESS NOTES
Reason for Admission:  Admitted from home at [facility]. Had 3 episodes of coffee ground emesis. Also reports having had 1 episode of bloody stool. History of COPD oxygen dependent. RUR Score:   Observation                  Plan for utilizing home health: She resides at [facility] assisted living with her . PCP: First and Last name:  Simi Mckeon   Name of Practice:    Are you a current patient: Yes/No: YES  Approximate date of last visit: 9/2020  Can you participate in a virtual visit with your PCP: NO                    Current Advanced Directive/Advance Care Plan: current copy on chart                         Transition of Care Plan:   Covid-negative  GI consult  Once medically stable will likely discharge home. Spoke with India Gregory at the facility [phone]. At discharge they will need a copy of Covid testing and discharge summary. Care Management Interventions  PCP Verified by CM: Yes(9/2020)  Mode of Transport at Discharge: BLS  Transition of Care Consult (CM Consult): Assisted Living(Lives with  at [facility] senior living)  Current Support Network: Lives with Spouse, Family Lives Nearby  Confirm Follow Up Transport: Family  The Patient and/or Patient Representative was Provided with a Choice of Provider and Agrees with the Discharge Plan?: (spouse Mickie WaltersCooper Deleon Provided?: No   Observation notice provided in writing to patient and/or caregiver as well as verbal explanation of the policy. Patients who are in outpatient status also receive the Observation notice.     Porfirio Carvajal RN CRM  Ext [phone]

## 2020-10-03 PROBLEM — K92.2 GIB (GASTROINTESTINAL BLEEDING): Status: ACTIVE | Noted: 2020-10-03

## 2020-10-03 LAB
ALBUMIN SERPL-MCNC: 2.7 G/DL (ref 3.5–5)
ALBUMIN/GLOB SERPL: 0.8 {RATIO} (ref 1.1–2.2)
ALP SERPL-CCNC: 71 U/L (ref 45–117)
ALT SERPL-CCNC: 10 U/L (ref 12–78)
ANION GAP SERPL CALC-SCNC: 5 MMOL/L (ref 5–15)
AST SERPL-CCNC: 17 U/L (ref 15–37)
BILIRUB SERPL-MCNC: 0.9 MG/DL (ref 0.2–1)
BUN SERPL-MCNC: 17 MG/DL (ref 6–20)
BUN/CREAT SERPL: 18 (ref 12–20)
CALCIUM SERPL-MCNC: 8.5 MG/DL (ref 8.5–10.1)
CHLORIDE SERPL-SCNC: 109 MMOL/L (ref 97–108)
CO2 SERPL-SCNC: 28 MMOL/L (ref 21–32)
CREAT SERPL-MCNC: 0.93 MG/DL (ref 0.55–1.02)
ERYTHROCYTE [DISTWIDTH] IN BLOOD BY AUTOMATED COUNT: 16.7 % (ref 11.5–14.5)
GLOBULIN SER CALC-MCNC: 3.5 G/DL (ref 2–4)
GLUCOSE SERPL-MCNC: 95 MG/DL (ref 65–100)
HCT VFR BLD AUTO: 30.6 % (ref 35–47)
HGB BLD-MCNC: 9.6 G/DL (ref 11.5–16)
MCH RBC QN AUTO: 28.9 PG (ref 26–34)
MCHC RBC AUTO-ENTMCNC: 31.4 G/DL (ref 30–36.5)
MCV RBC AUTO: 92.2 FL (ref 80–99)
NRBC # BLD: 0 K/UL (ref 0–0.01)
NRBC BLD-RTO: 0 PER 100 WBC
PLATELET # BLD AUTO: 149 K/UL (ref 150–400)
PMV BLD AUTO: 10.4 FL (ref 8.9–12.9)
POTASSIUM SERPL-SCNC: 3.6 MMOL/L (ref 3.5–5.1)
PROT SERPL-MCNC: 6.2 G/DL (ref 6.4–8.2)
RBC # BLD AUTO: 3.32 M/UL (ref 3.8–5.2)
SODIUM SERPL-SCNC: 142 MMOL/L (ref 136–145)
WBC # BLD AUTO: 8.8 K/UL (ref 3.6–11)

## 2020-10-03 PROCEDURE — G0378 HOSPITAL OBSERVATION PER HR: HCPCS

## 2020-10-03 PROCEDURE — 74011000250 HC RX REV CODE- 250: Performed by: FAMILY MEDICINE

## 2020-10-03 PROCEDURE — 94640 AIRWAY INHALATION TREATMENT: CPT

## 2020-10-03 PROCEDURE — 94760 N-INVAS EAR/PLS OXIMETRY 1: CPT

## 2020-10-03 PROCEDURE — 36415 COLL VENOUS BLD VENIPUNCTURE: CPT

## 2020-10-03 PROCEDURE — 74011250637 HC RX REV CODE- 250/637: Performed by: NURSE PRACTITIONER

## 2020-10-03 PROCEDURE — C9113 INJ PANTOPRAZOLE SODIUM, VIA: HCPCS | Performed by: EMERGENCY MEDICINE

## 2020-10-03 PROCEDURE — 74011250636 HC RX REV CODE- 250/636: Performed by: FAMILY MEDICINE

## 2020-10-03 PROCEDURE — 74011250636 HC RX REV CODE- 250/636: Performed by: EMERGENCY MEDICINE

## 2020-10-03 PROCEDURE — 99218 HC RM OBSERVATION: CPT

## 2020-10-03 PROCEDURE — 85027 COMPLETE CBC AUTOMATED: CPT

## 2020-10-03 PROCEDURE — 74011000250 HC RX REV CODE- 250: Performed by: EMERGENCY MEDICINE

## 2020-10-03 PROCEDURE — 65270000032 HC RM SEMIPRIVATE

## 2020-10-03 PROCEDURE — 96376 TX/PRO/DX INJ SAME DRUG ADON: CPT

## 2020-10-03 PROCEDURE — 80053 COMPREHEN METABOLIC PANEL: CPT

## 2020-10-03 RX ORDER — ALBUTEROL SULFATE 0.83 MG/ML
2.5 SOLUTION RESPIRATORY (INHALATION)
Status: DISCONTINUED | OUTPATIENT
Start: 2020-10-03 | End: 2020-10-05 | Stop reason: HOSPADM

## 2020-10-03 RX ADMIN — SODIUM CHLORIDE 40 MG: 9 INJECTION, SOLUTION INTRAMUSCULAR; INTRAVENOUS; SUBCUTANEOUS at 10:36

## 2020-10-03 RX ADMIN — Medication 10 ML: at 21:38

## 2020-10-03 RX ADMIN — CLONAZEPAM 0.5 MG: 1 TABLET ORAL at 21:36

## 2020-10-03 RX ADMIN — SODIUM CHLORIDE 75 ML/HR: 900 INJECTION, SOLUTION INTRAVENOUS at 18:41

## 2020-10-03 RX ADMIN — ALBUTEROL SULFATE 2.5 MG: 2.5 SOLUTION RESPIRATORY (INHALATION) at 08:05

## 2020-10-03 RX ADMIN — MEMANTINE HYDROCHLORIDE 10 MG: 10 TABLET ORAL at 18:40

## 2020-10-03 RX ADMIN — MEMANTINE HYDROCHLORIDE 10 MG: 10 TABLET ORAL at 08:27

## 2020-10-03 RX ADMIN — CLONAZEPAM 0.25 MG: 1 TABLET ORAL at 08:28

## 2020-10-03 RX ADMIN — ALBUTEROL SULFATE 2.5 MG: 2.5 SOLUTION RESPIRATORY (INHALATION) at 11:33

## 2020-10-03 RX ADMIN — SODIUM CHLORIDE 40 MG: 9 INJECTION, SOLUTION INTRAMUSCULAR; INTRAVENOUS; SUBCUTANEOUS at 21:37

## 2020-10-03 RX ADMIN — Medication 10 ML: at 14:00

## 2020-10-03 NOTE — PROGRESS NOTES
CM gave patient the Patient Guide to Observation and Outpatient Care which patient signed--original to patient and copy to chart. CM gave patient the Medicare Outpatient Observation Notice which patient signed-- original to patient and copy to chart.

## 2020-10-03 NOTE — PROGRESS NOTES
Problem: Falls - Risk of  Goal: *Absence of Falls  Description: Document Xiang Arleth Fall Risk and appropriate interventions in the flowsheet. Outcome: Progressing Towards Goal  Note: Fall Risk Interventions:  Mobility Interventions: Bed/chair exit alarm, Patient to call before getting OOB, PT Consult for mobility concerns, OT consult for ADLs    Mentation Interventions: Adequate sleep, hydration, pain control, Bed/chair exit alarm, Door open when patient unattended, More frequent rounding, Reorient patient    Medication Interventions: Bed/chair exit alarm, Patient to call before getting OOB, Teach patient to arise slowly    Elimination Interventions: Bed/chair exit alarm, Call light in reach, Toileting schedule/hourly rounds              Problem: Pressure Injury - Risk of  Goal: *Prevention of pressure injury  Description: Document Alejandro Scale and appropriate interventions in the flowsheet.   Outcome: Progressing Towards Goal  Note: Pressure Injury Interventions:  Sensory Interventions: Assess changes in LOC, Check visual cues for pain, Float heels, Keep linens dry and wrinkle-free, Minimize linen layers    Moisture Interventions: Absorbent underpads, Apply protective barrier, creams and emollients, Check for incontinence Q2 hours and as needed, Internal/External urinary devices, Minimize layers    Activity Interventions: PT/OT evaluation, Increase time out of bed, Assess need for specialty bed    Mobility Interventions: Float heels, PT/OT evaluation, Pressure redistribution bed/mattress (bed type)    Nutrition Interventions: Document food/fluid/supplement intake    Friction and Shear Interventions: Minimize layers, Apply protective barrier, creams and emollients, Lift team/patient mobility team                Problem: General Medical Care Plan  Goal: *Vital signs within specified parameters  Outcome: Progressing Towards Goal  Goal: *Labs within defined limits  Outcome: Progressing Towards Goal  Goal: *Absence of infection signs and symptoms  Outcome: Progressing Towards Goal  Goal: *Optimal pain control at patient's stated goal  Outcome: Progressing Towards Goal  Goal: *Skin integrity maintained  Outcome: Progressing Towards Goal  Goal: *Anxiety reduced or absent  Outcome: Progressing Towards Goal  Goal: *Progressive mobility and function (eg: ADL's)  Outcome: Progressing Towards Goal

## 2020-10-03 NOTE — PROGRESS NOTES
[address] [facility] Adult  Hospitalist Group                                                                                          Hospitalist Progress Note  Sim Mcclelland NP  Answering service: [phone] from in house phone        Date of Service:  10/3/2020  NAME:  Eliodoro Sicard  :  10/21/1930  MRN:  [id number]      Admission Summary: This is a 80 y.o. female with a PMH CVA with Cerenral Thrombosis, HECTOR, HLD, Debility, Alzheimer's, COPD- uses O2 at 2l as needed and Chronic Respiratory Failure who presents with GI bleed, she came to the ER via EMS because she had 2 episodes of coffee-ground emesis and one episode of blood in her stool per the facility she came from- lives at [facility]. Patient reports that she feels nauseous and-her mouth feels like \"paper\". Patient is somewhat confused with baseline dementia. Patient came to the ER and was requested to be admitted to the hospital service. The patient denies any Headache, blurry vision, sore throat, trouble swallowing, trouble with speech, chest pain, SOB, cough, fever, chills, abd pain, urinary symptoms, constipation, recent travels, sick contacts, focal or generalized neurological symptoms,  falls, injuries, rashes, contact with COVID-19 diagnosed patients, melena, hemoptysis, hematuria, rashes, denies starting any new medications and denies any other concerns or problems besides as mentioned above. Interval history / Subjective: Follow-up for GIB, COPD, SANIYA, Chronic Hypoxic Respiratory Failure and Elevated troponin.   -Patient seen and examined, no c/o's.        Assessment & Plan:     GI bleed: Upper versus lower, coffee-ground emesis, melena; hgb 12.6-->10.5, INR 1.  -telemetry bed,   -Protonix twice daily  -IV hydration  -NPO   -GI consult  -monitor Hgb, transfuse to keep Hgb >7.0  -advance diet as tolerated     Elevated troponin: peaked at 0.09  -EKG: no acute changes  -ECHO  -Cardiology consult     COPD: Patient does not appear to be in exacerbation.  -albuterol as needed  -ABG     Acute kidney injury: Likely secondary above  -IV hydration   -avoid nephrotoxic medication  -renally dose all medication     Chronic hypoxic respiratory failure: Continue home oxygen and continue monitor  Alzheimer's: continue home meds.     DVTppx: SCDs  Gippx: Protonix  Code Status: Full Code  Diet: Cardiac  Activity: OOB to chair TID and PRN  Discharge: TBD     Hospital Problems  Date Reviewed: 11/11/2019          Codes Class Noted POA    GI bleed ICD-10-CM: K92.2  ICD-9-CM: 578.9  10/1/2020 Unknown                Review of Systems:   A comprehensive review of systems was negative except for that written in the HPI. Vital Signs:    Last 24hrs VS reviewed since prior progress note. Most recent are:  Visit Vitals  BP (!) 109/45 (BP 1 Location: Right arm, BP Patient Position: At rest)   Pulse 87   Temp 98.6 °F (37 °C)   Resp 20   Wt 77.2 kg (170 lb 4.8 oz)   SpO2 96%   BMI 31.15 kg/m²         Intake/Output Summary (Last 24 hours) at 10/3/2020 1229  Last data filed at 10/3/2020 0600  Gross per 24 hour   Intake 2000 ml   Output 760 ml   Net 1240 ml        Physical Examination:     Constitutional:  No acute distress, cooperative, pleasant    ENT:  Oral mucosa moist.    Resp:  CTA bilaterally. No wheezing/rhonchi/rales. No accessory muscle use. CV:  Regular rhythm, normal rate, no murmurs, gallops, rubs. GI:  Soft, non distended, non tender, no guarding, BS present. Musculoskeletal:  No edema, warm, 2+ pulses throughout. Neurologic:  Moves all extremities. AAOx3, CN II-XII reviewed. Skin:  Good turgor, no rashes or ulcers  Psych:  Poor insight, Not anxious nor agitated.           Data Review:    Review and/or order of clinical lab test      Labs:     Recent Labs     10/03/20  0519 10/02/20  0045  10/01/20  1029   WBC 8.8  --   --  8.4   HGB 9.6* 10.5*   < > 12.6   HCT 30.6*  --   --  39.6   *  --   --  190    < > = values in this interval not displayed. Recent Labs     10/03/20  0519 10/02/20  0045 10/01/20  1029    144 138   K 3.6 3.8 4.5   * 110* 107   CO2 28 27 24   BUN 17 32* 46*   CREA 0.93 1.04* 1.29*   GLU 95 92 113*   CA 8.5 8.9 9.2     Recent Labs     10/03/20  0519 10/02/20  0045 10/01/20  1029   ALT 10* 11* 14   AP 71 74 86   TBILI 0.9 0.7 0.5   TP 6.2* 6.4 7.6   ALB 2.7* 2.7* 3.2*   GLOB 3.5 3.7 4.4*     Recent Labs     10/01/20  1029   INR 1.0   PTP 10.7      No results for input(s): FE, TIBC, PSAT, FERR in the last 72 hours. No results found for: FOL, RBCF   No results for input(s): PH, PCO2, PO2 in the last 72 hours.   Recent Labs     10/02/20  0945 10/02/20  0045 10/01/20  1906   TROIQ <0.05 0.09* 0.07*     Lab Results   Component Value Date/Time    Cholesterol, total 223 (H) 01/21/2016 04:38 PM    HDL Cholesterol 78 01/21/2016 04:38 PM    LDL,Direct 107 (H) 05/26/2020 01:33 PM    LDL, calculated 131 (H) 01/21/2016 04:38 PM    Triglyceride 70 01/21/2016 04:38 PM    CHOL/HDL Ratio 2.8 06/17/2011 04:30 AM     No results found for: [facility]  Lab Results   Component Value Date/Time    Color YELLOW/STRAW 11/12/2016 01:11 PM    Appearance CLOUDY (A) 11/12/2016 01:11 PM    Specific gravity 1.020 11/12/2016 01:11 PM    pH (UA) 5.5 11/12/2016 01:11 PM    Protein TRACE (A) 11/12/2016 01:11 PM    Glucose NEGATIVE  11/12/2016 01:11 PM    Ketone TRACE (A) 11/12/2016 01:11 PM    Bilirubin NEGATIVE  11/12/2016 01:11 PM    Urobilinogen 1.0 11/12/2016 01:11 PM    Nitrites POSITIVE (A) 11/12/2016 01:11 PM    Leukocyte Esterase SMALL (A) 11/12/2016 01:11 PM    Epithelial cells FEW 11/12/2016 01:11 PM    Bacteria 4+ (A) 11/12/2016 01:11 PM    WBC 20-50 11/12/2016 01:11 PM    RBC 0-5 11/12/2016 01:11 PM         Medications Reviewed:     Current Facility-Administered Medications   Medication Dose Route Frequency    memantine (NAMENDA) tablet 10 mg  10 mg Oral BID    clonazePAM (KlonoPIN) tablet 0.5 mg  0.5 mg Oral QHS    clonazePAM (KlonoPIN) tablet 0.25 mg  0.25 mg Oral DAILY    albuterol (PROVENTIL VENTOLIN) nebulizer solution 2.5 mg  2.5 mg Nebulization Q4H RT    sodium chloride (NS) flush 5-40 mL  5-40 mL IntraVENous PRN    ondansetron (ZOFRAN) injection 4 mg  4 mg IntraVENous Q4H PRN    sodium chloride (NS) flush 5-40 mL  5-40 mL IntraVENous Q8H    sodium chloride (NS) flush 5-40 mL  5-40 mL IntraVENous PRN    0.9% sodium chloride infusion  75 mL/hr IntraVENous CONTINUOUS    pantoprazole (PROTONIX) 40 mg in 0.9% sodium chloride 10 mL injection  40 mg IntraVENous Q12H     ______________________________________________________________________  EXPECTED LENGTH OF STAY: - - -  ACTUAL LENGTH OF STAY:          0                 Carmen Cordoba NP

## 2020-10-03 NOTE — PROGRESS NOTES
Bedside and Verbal shift change report given to Jaren Pelayo (oncoming nurse) by Cheryle Prow (offgoing nurse). Report included the following information SBAR, Kardex, Intake/Output, MAR, Recent Results, Cardiac Rhythm NSR and Quality Measures.

## 2020-10-03 NOTE — PROGRESS NOTES
Bedside shift change report given to Casey Strickland (oncoming nurse) by Timoteo Khan (offgoing nurse). Report included the following information Intake/Output and MAR.

## 2020-10-04 LAB
ANION GAP SERPL CALC-SCNC: 5 MMOL/L (ref 5–15)
BUN SERPL-MCNC: 13 MG/DL (ref 6–20)
BUN/CREAT SERPL: 14 (ref 12–20)
CALCIUM SERPL-MCNC: 8.1 MG/DL (ref 8.5–10.1)
CHLORIDE SERPL-SCNC: 107 MMOL/L (ref 97–108)
CO2 SERPL-SCNC: 28 MMOL/L (ref 21–32)
CREAT SERPL-MCNC: 0.9 MG/DL (ref 0.55–1.02)
ERYTHROCYTE [DISTWIDTH] IN BLOOD BY AUTOMATED COUNT: 16.4 % (ref 11.5–14.5)
GLUCOSE SERPL-MCNC: 97 MG/DL (ref 65–100)
HCT VFR BLD AUTO: 29.2 % (ref 35–47)
HGB BLD-MCNC: 9.2 G/DL (ref 11.5–16)
MCH RBC QN AUTO: 28.9 PG (ref 26–34)
MCHC RBC AUTO-ENTMCNC: 31.5 G/DL (ref 30–36.5)
MCV RBC AUTO: 91.8 FL (ref 80–99)
NRBC # BLD: 0 K/UL (ref 0–0.01)
NRBC BLD-RTO: 0 PER 100 WBC
PLATELET # BLD AUTO: 153 K/UL (ref 150–400)
PMV BLD AUTO: 10.7 FL (ref 8.9–12.9)
POTASSIUM SERPL-SCNC: 3.7 MMOL/L (ref 3.5–5.1)
RBC # BLD AUTO: 3.18 M/UL (ref 3.8–5.2)
SODIUM SERPL-SCNC: 140 MMOL/L (ref 136–145)
WBC # BLD AUTO: 8.3 K/UL (ref 3.6–11)

## 2020-10-04 PROCEDURE — 36415 COLL VENOUS BLD VENIPUNCTURE: CPT

## 2020-10-04 PROCEDURE — 74011250636 HC RX REV CODE- 250/636: Performed by: EMERGENCY MEDICINE

## 2020-10-04 PROCEDURE — 80048 BASIC METABOLIC PNL TOTAL CA: CPT

## 2020-10-04 PROCEDURE — 74011250637 HC RX REV CODE- 250/637: Performed by: NURSE PRACTITIONER

## 2020-10-04 PROCEDURE — 65270000032 HC RM SEMIPRIVATE

## 2020-10-04 PROCEDURE — C9113 INJ PANTOPRAZOLE SODIUM, VIA: HCPCS | Performed by: EMERGENCY MEDICINE

## 2020-10-04 PROCEDURE — 77010033678 HC OXYGEN DAILY

## 2020-10-04 PROCEDURE — 74011250636 HC RX REV CODE- 250/636: Performed by: FAMILY MEDICINE

## 2020-10-04 PROCEDURE — 85027 COMPLETE CBC AUTOMATED: CPT

## 2020-10-04 PROCEDURE — G0378 HOSPITAL OBSERVATION PER HR: HCPCS

## 2020-10-04 PROCEDURE — 74011000250 HC RX REV CODE- 250: Performed by: EMERGENCY MEDICINE

## 2020-10-04 RX ADMIN — CLONAZEPAM 0.25 MG: 1 TABLET ORAL at 09:26

## 2020-10-04 RX ADMIN — MEMANTINE HYDROCHLORIDE 10 MG: 10 TABLET ORAL at 09:26

## 2020-10-04 RX ADMIN — SODIUM CHLORIDE 75 ML/HR: 900 INJECTION, SOLUTION INTRAVENOUS at 23:30

## 2020-10-04 RX ADMIN — SODIUM CHLORIDE 40 MG: 9 INJECTION, SOLUTION INTRAMUSCULAR; INTRAVENOUS; SUBCUTANEOUS at 10:58

## 2020-10-04 RX ADMIN — MEMANTINE HYDROCHLORIDE 10 MG: 10 TABLET ORAL at 18:55

## 2020-10-04 RX ADMIN — Medication 10 ML: at 14:36

## 2020-10-04 RX ADMIN — SODIUM CHLORIDE 75 ML/HR: 900 INJECTION, SOLUTION INTRAVENOUS at 09:42

## 2020-10-04 RX ADMIN — Medication 10 ML: at 06:17

## 2020-10-04 RX ADMIN — SODIUM CHLORIDE 40 MG: 9 INJECTION, SOLUTION INTRAMUSCULAR; INTRAVENOUS; SUBCUTANEOUS at 23:25

## 2020-10-04 RX ADMIN — CLONAZEPAM 0.5 MG: 1 TABLET ORAL at 21:51

## 2020-10-04 NOTE — PROGRESS NOTES
[address] [facility] Adult  Hospitalist Group                                                                                          Hospitalist Progress Note  Monse Truong NP  Answering service: [phone] from in house phone        Date of Service:  10/4/2020  NAME:  Amirah Montoya  :  10/21/1930  MRN:  [id number]      Admission Summary: This is a 80 y.o. female with a PMH CVA with Cerenral Thrombosis, HECTOR, HLD, Debility, Alzheimer's, COPD- uses O2 at 2l as needed and Chronic Respiratory Merilynn Hippo presents with GI bleed, she came to the [facility] she had 2 episodes of coffee-ground emesis and one episode of blood in her stool per the facility she came from- lives at [facility] reports that she feels nauseous and-her mouth feels like \"paper\". Patient is somewhat confused with baseline dementia. Patient came to the ER and was requested to be admitted to the hospital service. The patient denies any Headache, blurry vision, sore throat, trouble swallowing, trouble with speech, chest pain, SOB, cough, fever, chills, abd pain, urinary symptoms, constipation, recent travels, sick contacts, focal or generalized neurological symptoms,  falls, injuries, rashes, contact with COVID-19 diagnosed patients, melena, hemoptysis, hematuria, rashes, denies starting any new medications and denies any other concerns or problems besides as mentioned above. Interval history / Subjective: Follow-up for GIB, COPD, SANIYA, Chronic Hypoxic Respiratory Failure and Elevated troponin.   -Patient seen and examined, no c/o's. Tolerating regular diet well, no n/v/d.      Assessment & Plan:     GI bleed: Upper versus lower, coffee-ground emesis, melena: resolved, on admit Hgb 12.6-->10.5, INR 1.  -telemetry bed  -Protonix twice daily  -IV hydration  -tolerating regular diet   -GI consult  -monitor Hgb, transfuse to keep Hgb >7.0     Elevated troponin: peaked at 0.09  -EKG: no acute changes  -ECHO  -Cardiology consult     COPD: Patient does not appear to be in exacerbation.  -albuterol as needed  -ABG     Acute kidney injury: Likely secondary above  -IV hydration   -avoid nephrotoxic medication  -renally dose all medication     Chronic hypoxic respiratory failure: Continue home oxygen and continue monitor  Alzheimer's: continue home meds.     DVTppx: SCDs  Gippx: Protonix  Code Status: Full Code  Diet: Cardiac  Activity: OOB to chair TID and PRN  Discharge: TBD     Hospital Problems  Date Reviewed: 11/11/2019          Codes Class Noted POA    GIB (gastrointestinal bleeding) ICD-10-CM: K92.2  ICD-9-CM: 578.9  10/3/2020 Unknown        GI bleed ICD-10-CM: K92.2  ICD-9-CM: 578.9  10/1/2020 Unknown                Review of Systems:   A comprehensive review of systems was negative except for that written in the HPI. Vital Signs:    Last 24hrs VS reviewed since prior progress note. Most recent are:  Visit Vitals  BP (!) 156/73 (BP 1 Location: Left arm, BP Patient Position: At rest)   Pulse 89   Temp 98.1 °F (36.7 °C)   Resp 16   Wt 77.2 kg (170 lb 1.6 oz)   SpO2 95%   BMI 31.11 kg/m²         Intake/Output Summary (Last 24 hours) at 10/4/2020 1221  Last data filed at 10/4/2020 0816  Gross per 24 hour   Intake 1050 ml   Output 1075 ml   Net -25 ml        Physical Examination:     Constitutional:  No acute distress, cooperative, pleasant    ENT:  Oral mucosa moist.    Resp:  CTA bilaterally. No wheezing/rhonchi/rales. No accessory muscle use. CV:  Regular rhythm, normal rate, no murmurs, gallops, rubs.    GI:  Soft, non distended, non tender, no guarding, BS present.    Musculoskeletal:  No edema, warm, 2+ pulses throughout.    Neurologic:  Moves all extremities. AAOx3, CN II-XII reviewed. Skin:  Good turgor, no rashes or ulcers  Psych:  Poor insight, Not anxious nor agitated.          Data Review:    Review and/or order of clinical lab test      Labs:     Recent Labs     10/04/20  0149 10/03/20  0519   WBC 8.3 8.8   HGB 9.2* 9.6*   HCT 29.2* 30.6*    149*     Recent Labs     10/04/20  0149 10/03/20  0519 10/02/20  0045    142 144   K 3.7 3.6 3.8    109* 110*   CO2 28 28 27   BUN 13 17 32*   CREA 0.90 0.93 1.04*   GLU 97 95 92   CA 8.1* 8.5 8.9     Recent Labs     10/03/20  0519 10/02/20  0045   ALT 10* 11*   AP 71 74   TBILI 0.9 0.7   TP 6.2* 6.4   ALB 2.7* 2.7*   GLOB 3.5 3.7     No results for input(s): INR, PTP, APTT, INREXT in the last 72 hours. No results for input(s): FE, TIBC, PSAT, FERR in the last 72 hours. No results found for: FOL, RBCF   No results for input(s): PH, PCO2, PO2 in the last 72 hours.   Recent Labs     10/02/20  0945 10/02/20  0045 10/01/20  1906   TROIQ <0.05 0.09* 0.07*     Lab Results   Component Value Date/Time    Cholesterol, total 223 (H) 01/21/2016 04:38 PM    HDL Cholesterol 78 01/21/2016 04:38 PM    LDL,Direct 107 (H) 05/26/2020 01:33 PM    LDL, calculated 131 (H) 01/21/2016 04:38 PM    Triglyceride 70 01/21/2016 04:38 PM    CHOL/HDL Ratio 2.8 06/17/2011 04:30 AM     No results found for: [facility]  Lab Results   Component Value Date/Time    Color YELLOW/STRAW 11/12/2016 01:11 PM    Appearance CLOUDY (A) 11/12/2016 01:11 PM    Specific gravity 1.020 11/12/2016 01:11 PM    pH (UA) 5.5 11/12/2016 01:11 PM    Protein TRACE (A) 11/12/2016 01:11 PM    Glucose NEGATIVE  11/12/2016 01:11 PM    Ketone TRACE (A) 11/12/2016 01:11 PM    Bilirubin NEGATIVE  11/12/2016 01:11 PM    Urobilinogen 1.0 11/12/2016 01:11 PM    Nitrites POSITIVE (A) 11/12/2016 01:11 PM    Leukocyte Esterase SMALL (A) 11/12/2016 01:11 PM    Epithelial cells FEW 11/12/2016 01:11 PM    Bacteria 4+ (A) 11/12/2016 01:11 PM    WBC 20-50 11/12/2016 01:11 PM    RBC 0-5 11/12/2016 01:11 PM         Medications Reviewed:     Current Facility-Administered Medications   Medication Dose Route Frequency    albuterol (PROVENTIL VENTOLIN) nebulizer solution 2.5 mg  2.5 mg Nebulization Q4H PRN    memantine (NAMENDA) tablet 10 mg  10 mg Oral BID    clonazePAM (KlonoPIN) tablet 0.5 mg  0.5 mg Oral QHS    clonazePAM (KlonoPIN) tablet 0.25 mg  0.25 mg Oral DAILY    sodium chloride (NS) flush 5-40 mL  5-40 mL IntraVENous PRN    ondansetron (ZOFRAN) injection 4 mg  4 mg IntraVENous Q4H PRN    sodium chloride (NS) flush 5-40 mL  5-40 mL IntraVENous Q8H    sodium chloride (NS) flush 5-40 mL  5-40 mL IntraVENous PRN    0.9% sodium chloride infusion  75 mL/hr IntraVENous CONTINUOUS    pantoprazole (PROTONIX) 40 mg in 0.9% sodium chloride 10 mL injection  40 mg IntraVENous Q12H     ______________________________________________________________________  EXPECTED LENGTH OF STAY: - - -  ACTUAL LENGTH OF STAY:          1                 Carmen Cordoba NP

## 2020-10-04 NOTE — PROGRESS NOTES
Updated Ms. Curt Stephens about pts progress and transfer to room 540. All concerns and questions addressed.

## 2020-10-04 NOTE — PROGRESS NOTES
Orthopedics Spine Incoming Transfer Nursing Note        TRANSFER - IN REPORT:    Verbal and/or Written report received from Milly Le RN by Osito Contreras RN on Jocelyn Barbosa a 80 y.o. female who was admitted on 10/1/2020 10:00 AM, with an admitting diagnosis of GI bleed [K92.2]  GIB (gastrointestinal bleeding) [K92.2] and being transferred for routine progression of care. Surgery: * No surgery found *      Lines:   Peripheral IV 01/32/08 Left;Upper Basilic (Active)   Site Assessment Clean, dry, & intact 10/03/20 1600   Phlebitis Assessment 0 10/03/20 1600   Infiltration Assessment 0 10/03/20 1600   Dressing Status Clean, dry, & intact 10/03/20 1600   Dressing Type Transparent;Tape 10/03/20 1600   Hub Color/Line Status Pink; Infusing 10/03/20 1600   Action Taken Open ports on tubing capped 10/03/20 1600   Alcohol Cap Used Yes 10/03/20 1600       Report consisted of the following information SBAR, Kardex and MAR and the information was reviewed with the reporting nurse. Opportunity for questions and clarifications were provided. Assessment to be completed when patient arrives on the unit.     Osito Contreras RN, BSN, [facility]    10/3/2020 at 9:03 PM

## 2020-10-04 NOTE — PROGRESS NOTES
Problem: Falls - Risk of  Goal: *Absence of Falls  Description: Document Rebecca Calvillo Fall Risk and appropriate interventions in the flowsheet. Outcome: Progressing Towards Goal  Note: Fall Risk Interventions:  Mobility Interventions: Bed/chair exit alarm, Patient to call before getting OOB, PT Consult for mobility concerns, OT consult for ADLs    Mentation Interventions: Adequate sleep, hydration, pain control, Bed/chair exit alarm, Door open when patient unattended, More frequent rounding, Reorient patient    Medication Interventions: Bed/chair exit alarm, Patient to call before getting OOB, Teach patient to arise slowly    Elimination Interventions: Bed/chair exit alarm, Call light in reach, Toileting schedule/hourly rounds              Problem: Pressure Injury - Risk of  Goal: *Prevention of pressure injury  Description: Document Alejandro Scale and appropriate interventions in the flowsheet.   Outcome: Progressing Towards Goal  Note: Pressure Injury Interventions:  Sensory Interventions: Assess changes in LOC, Check visual cues for pain, Float heels, Keep linens dry and wrinkle-free, Minimize linen layers    Moisture Interventions: Absorbent underpads, Apply protective barrier, creams and emollients, Check for incontinence Q2 hours and as needed, Internal/External urinary devices, Minimize layers    Activity Interventions: PT/OT evaluation, Increase time out of bed, Assess need for specialty bed    Mobility Interventions: Float heels, PT/OT evaluation, Pressure redistribution bed/mattress (bed type)    Nutrition Interventions: Document food/fluid/supplement intake    Friction and Shear Interventions: Minimize layers, Apply protective barrier, creams and emollients, Lift team/patient mobility team                Problem: General Medical Care Plan  Goal: *Skin integrity maintained  Outcome: Progressing Towards Goal

## 2020-10-04 NOTE — PROGRESS NOTES
Orthopedic Spine Nursing Dual Skin Assessment      Primary Nurse Franc Leung RN and Derek Roldan RN performed a dual skin assessment on patient Kt Reeves, admitted on 10/1/2020 10:00 AM, found with No impairment noted, and with a Alejandro Score: 16. Wound care consult will be placed if appropriate.       Signed by: Franc Leung RN, BSN, CMSRN                       10/3/2020 at 11:02 PM

## 2020-10-04 NOTE — PROGRESS NOTES
Problem: Falls - Risk of  Goal: *Absence of Falls  Description: Document Aniya Manzanares Fall Risk and appropriate interventions in the flowsheet. Outcome: Progressing Towards Goal  Note: Fall Risk Interventions:  Mobility Interventions: Bed/chair exit alarm    Mentation Interventions: Adequate sleep, hydration, pain control, Door open when patient unattended    Medication Interventions: Patient to call before getting OOB    Elimination Interventions: Call light in reach, Toileting schedule/hourly rounds              Problem: Patient Education: Go to Patient Education Activity  Goal: Patient/Family Education  Outcome: Progressing Towards Goal     Problem: Pressure Injury - Risk of  Goal: *Prevention of pressure injury  Description: Document Alejandro Scale and appropriate interventions in the flowsheet.   Outcome: Progressing Towards Goal  Note: Pressure Injury Interventions:  Sensory Interventions: Assess changes in LOC, Float heels    Moisture Interventions: Internal/External urinary devices    Activity Interventions: PT/OT evaluation    Mobility Interventions: Float heels, PT/OT evaluation    Nutrition Interventions: Offer support with meals,snacks and hydration    Friction and Shear Interventions: Minimize layers, Apply protective barrier, creams and emollients, Lift team/patient mobility team                Problem: Patient Education: Go to Patient Education Activity  Goal: Patient/Family Education  Outcome: Progressing Towards Goal     Problem: General Medical Care Plan  Goal: *Vital signs within specified parameters  Outcome: Progressing Towards Goal  Goal: *Labs within defined limits  Outcome: Progressing Towards Goal  Goal: *Absence of infection signs and symptoms  Outcome: Progressing Towards Goal  Goal: *Optimal pain control at patient's stated goal  Outcome: Progressing Towards Goal  Goal: *Skin integrity maintained  Outcome: Progressing Towards Goal  Goal: *Fluid volume balance  Outcome: Progressing Towards Goal  Goal: *Optimize nutritional status  Outcome: Progressing Towards Goal  Goal: *Anxiety reduced or absent  Outcome: Progressing Towards Goal  Goal: *Progressive mobility and function (eg: ADL's)  Outcome: Progressing Towards Goal     Problem: Patient Education: Go to Patient Education Activity  Goal: Patient/Family Education  Outcome: Progressing Towards Goal

## 2020-10-04 NOTE — PROGRESS NOTES
AAOx2, 3LNC, VSS, PERRLA, denies pain, no distress s/s. Resting in bed, follows commands, and is calm and pleasant.

## 2020-10-04 NOTE — PROGRESS NOTES
ADULT PROTOCOL: JET AEROSOL ASSESSMENT    Patient  Malathi Ly     80 y.o.   female     10/3/2020  8:00 PM    Breath Sounds Pre Procedure:  Breath Sounds Bilateral: Diminished              Left Breath Sounds: Diminished                        Right Breath Sounds: Diminished    Breath Sounds Post Procedure:                                                 Breathing pattern: Pre procedure  Breathing Pattern: Regular          Post procedure       Cough: Pre procedure  Cough: Non-productive               Post procedure      Heart Rate: Pre procedure Pulse: 79           Post procedure      Resp Rate: Pre procedure  Respirations: 18           Post procedure          Nebulizer Therapy: Current medications Aerosolized Medications: Albuterol       Problem List:   Patient Active Problem List   Diagnosis Code    Cerebral thrombosis with cerebral infarction ([facility]) I63.30    COPD (chronic obstructive pulmonary disease) ([facility]) J44.9    Chronic respiratory failure with hypoxia (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) G30.1, F02.80    GI bleed K92.2    GIB (gastrointestinal bleeding) K92.2       Patient alert and cooperative to use MDI: Not applicable    Home Respiratory Therapy Regimen/Frequency:  NO  Medication   Device  Frequency     SEVERITY INDEX:    ITEM 0 1 2 3 4 Score   Respiratory Pattern and or Rate Regular  10-19 Regular  20-24   24-30    30-34 Severe SOB or   Greater than 35 0   Breath Sounds Clear Occasional Wheeze Mild Wheezing Moderate Wheezing  wheezing/Absent breath sounds 0   Shortness of Breath None Dyspnea on Exertion Dyspnea at Rest Moderate Shortness of Breath at Rest Severe Shortness of Breath - Limited Speech 0 Total Score:  0    * Scoring Guidelines  0-4 pts:  PRN-BID   5-7 pts:  BID, TID, QID  8-9 pts:  TID, QID, Q6  10-12 pts:  Q4-Q6   Guidelines used with clinical judgement. PRN Treatments can be ordered to supplement scheduled treatments. Regardless of score, frequency should not be less than normal home regimen.     Recommended Order/Frequency:  Q4 PRN    Comments:          Respiratory Therapist: [address] [place] [address]

## 2020-10-05 ENCOUNTER — TELEPHONE (OUTPATIENT)
Dept: INTERNAL MEDICINE CLINIC | Age: 85
End: 2020-10-05

## 2020-10-05 VITALS
DIASTOLIC BLOOD PRESSURE: 64 MMHG | HEART RATE: 90 BPM | BODY MASS INDEX: 31.11 KG/M2 | OXYGEN SATURATION: 94 % | SYSTOLIC BLOOD PRESSURE: 126 MMHG | TEMPERATURE: 98.8 F | RESPIRATION RATE: 16 BRPM | WEIGHT: 170.1 LBS

## 2020-10-05 LAB
ANION GAP SERPL CALC-SCNC: 4 MMOL/L (ref 5–15)
BUN SERPL-MCNC: 10 MG/DL (ref 6–20)
BUN/CREAT SERPL: 11 (ref 12–20)
CALCIUM SERPL-MCNC: 8.5 MG/DL (ref 8.5–10.1)
CHLORIDE SERPL-SCNC: 105 MMOL/L (ref 97–108)
CO2 SERPL-SCNC: 32 MMOL/L (ref 21–32)
CREAT SERPL-MCNC: 0.95 MG/DL (ref 0.55–1.02)
ERYTHROCYTE [DISTWIDTH] IN BLOOD BY AUTOMATED COUNT: 15.9 % (ref 11.5–14.5)
GLUCOSE SERPL-MCNC: 96 MG/DL (ref 65–100)
HCT VFR BLD AUTO: 29.3 % (ref 35–47)
HGB BLD-MCNC: 9.3 G/DL (ref 11.5–16)
MCH RBC QN AUTO: 29.1 PG (ref 26–34)
MCHC RBC AUTO-ENTMCNC: 31.7 G/DL (ref 30–36.5)
MCV RBC AUTO: 91.6 FL (ref 80–99)
NRBC # BLD: 0 K/UL (ref 0–0.01)
NRBC BLD-RTO: 0 PER 100 WBC
PLATELET # BLD AUTO: 176 K/UL (ref 150–400)
PMV BLD AUTO: 10.2 FL (ref 8.9–12.9)
POTASSIUM SERPL-SCNC: 3.4 MMOL/L (ref 3.5–5.1)
RBC # BLD AUTO: 3.2 M/UL (ref 3.8–5.2)
SODIUM SERPL-SCNC: 141 MMOL/L (ref 136–145)
WBC # BLD AUTO: 8.5 K/UL (ref 3.6–11)

## 2020-10-05 PROCEDURE — C9113 INJ PANTOPRAZOLE SODIUM, VIA: HCPCS | Performed by: EMERGENCY MEDICINE

## 2020-10-05 PROCEDURE — 36415 COLL VENOUS BLD VENIPUNCTURE: CPT

## 2020-10-05 PROCEDURE — 80048 BASIC METABOLIC PNL TOTAL CA: CPT

## 2020-10-05 PROCEDURE — 74011250636 HC RX REV CODE- 250/636: Performed by: EMERGENCY MEDICINE

## 2020-10-05 PROCEDURE — 74011250637 HC RX REV CODE- 250/637: Performed by: NURSE PRACTITIONER

## 2020-10-05 PROCEDURE — G0378 HOSPITAL OBSERVATION PER HR: HCPCS

## 2020-10-05 PROCEDURE — 74011000250 HC RX REV CODE- 250: Performed by: EMERGENCY MEDICINE

## 2020-10-05 PROCEDURE — 85027 COMPLETE CBC AUTOMATED: CPT

## 2020-10-05 RX ORDER — POTASSIUM CHLORIDE 750 MG/1
20 TABLET, FILM COATED, EXTENDED RELEASE ORAL DAILY
Status: DISCONTINUED | OUTPATIENT
Start: 2020-10-05 | End: 2020-10-05 | Stop reason: HOSPADM

## 2020-10-05 RX ORDER — PANTOPRAZOLE SODIUM 40 MG/1
40 TABLET, DELAYED RELEASE ORAL DAILY
Qty: 30 TAB | Refills: 0 | Status: SHIPPED | OUTPATIENT
Start: 2020-10-05 | End: 2021-01-28 | Stop reason: SDUPTHER

## 2020-10-05 RX ADMIN — MEMANTINE HYDROCHLORIDE 10 MG: 10 TABLET ORAL at 09:32

## 2020-10-05 RX ADMIN — Medication 10 ML: at 06:00

## 2020-10-05 RX ADMIN — POTASSIUM CHLORIDE 20 MEQ: 750 TABLET, FILM COATED, EXTENDED RELEASE ORAL at 11:18

## 2020-10-05 RX ADMIN — CLONAZEPAM 0.25 MG: 1 TABLET ORAL at 09:32

## 2020-10-05 RX ADMIN — SODIUM CHLORIDE 40 MG: 9 INJECTION, SOLUTION INTRAMUSCULAR; INTRAVENOUS; SUBCUTANEOUS at 11:18

## 2020-10-05 NOTE — PROGRESS NOTES
Bedside shift change report given to Brandy Pelaez (oncoming nurse) by Marcello Morgan RN (offgoing nurse). Report included the following information SBAR, Kardex, Intake/Output and MAR.

## 2020-10-05 NOTE — DISCHARGE INSTRUCTIONS
Discharge Instructions       PATIENT ID: Opal Saini  MRN: [id number]   YOB: 1930    DATE OF ADMISSION: 10/1/2020 10:00 AM    DATE OF DISCHARGE: 10/5/2020    PRIMARY CARE PROVIDER: Aura Fishman MD     ATTENDING PHYSICIAN: Armando Andersen MD  DISCHARGING PROVIDER: Jana Yusuf NP    To contact this individual call [phone] and ask the  to page. If unavailable ask to be transferred the Adult Hospitalist Department. DISCHARGE DIAGNOSES: GIB, COPD, SANIYA, Chronic Hypoxic Respiratory Failure and Elevated troponin    CONSULTATIONS: IP CONSULT TO GASTROENTEROLOGY  IP CONSULT TO GASTROENTEROLOGY  IP CONSULT TO GASTROENTEROLOGY  IP CONSULT TO CARDIOLOGY    PROCEDURES/SURGERIES: * No surgery found *    PENDING TEST RESULTS:   At the time of discharge the following test results are still pending: none. FOLLOW UP APPOINTMENTS:   Follow-up Information     Follow up With Specialties Details Why Contact Info    Aura Fishman MD Internal Medicine In 3 days  [address]  [facility]  [phone]             ADDITIONAL CARE RECOMMENDATIONS:   You have been deemed stable for discharge and are being discharged back to your residential. Should you need medication refills beyond what we have prescribed for you, you will need to contact your primary care provider for refills.     Return to the ER or notify your primary care office if you have a fever greater than 101.5 associated with chills, chest pain, or signs and symptoms of a stroke which are:  B E F A S T  -loss of balance, headaches or dizziness  -eyes blurred vision (sudden)  -asymmetrical facial symmetry (side of face droops)  -arms and leg weakness  -slurred speech / difficulty speaking  -if you experience any of these (time to call for an ambulance)      DIET: Cardiac Diet      ACTIVITY: Activity as tolerated        DISCHARGE MEDICATIONS:   See Medication Reconciliation Form    · It is important that you take the medication exactly as they are prescribed. · Keep your medication in the bottles provided by the pharmacist and keep a list of the medication names, dosages, and times to be taken in your wallet. · Do not take other medications without consulting your doctor. NOTIFY YOUR PHYSICIAN FOR ANY OF THE FOLLOWING:   Fever over 101 degrees for 24 hours. Chest pain, shortness of breath, fever, chills, nausea, vomiting, diarrhea, change in mentation, falling, weakness, bleeding. Severe pain or pain not relieved by medications. Or, any other signs or symptoms that you may have questions about. DISPOSITION:    Home With:   OT  PT  HH  RN      X SNF/Inpatient Rehab/LTAC    Independent/assisted living    Hospice    Other:     Patient Education        Gastrointestinal Bleeding: Care Instructions  Your Care Instructions     The digestive or gastrointestinal tract goes from the mouth to the anus. It is often called the GI tract. Bleeding can happen anywhere in the GI tract. It may be caused by an ulcer, an infection, or cancer. It may also be caused by medicines such as aspirin or ibuprofen. Light bleeding may not cause any symptoms at first. But if you continue to bleed for a while, you may feel very weak or tired. Sudden, heavy bleeding means you need to see a doctor right away. This kind of bleeding can be very dangerous. But it can usually be cured or controlled. The doctor may do some tests to find the cause of your bleeding. Follow-up care is a key part of your treatment and safety. Be sure to make and go to all appointments, and call your doctor if you are having problems. It's also a good idea to know your test results and keep a list of the medicines you take. How can you care for yourself at home? · Be safe with medicines. Take your medicines exactly as prescribed. Call your doctor if you think you are having a problem with your medicine.  You will get more details on the specific medicines your doctor prescribes. · Do not take aspirin or other anti-inflammatory medicines, such as naproxen (Aleve) or ibuprofen (Advil, Motrin), without talking to your doctor first. Ask your doctor if it is okay to use acetaminophen (Tylenol). · Do not drink alcohol. · The bleeding may make you lose iron. So it's important to eat foods that have a lot of iron. These include red meat, shellfish, poultry, and eggs. They also include beans, raisins, whole-grain breads, and leafy green vegetables. If you want help planning meals, you can make an appointment with a dietitian. When should you call for help? Call 911 anytime you think you may need emergency care. For example, call if:    · You have sudden, severe belly pain.     · You vomit blood or what looks like coffee grounds.     · You passed out (lost consciousness).     · Your stools are maroon or very bloody. Call your doctor now or seek immediate medical care if:    · You are dizzy or lightheaded, or you feel like you may faint.     · Your stools are black and look like tar, or they have streaks of blood.     · You have belly pain.     · You vomit or have nausea.     · You have trouble swallowing, or it hurts when you swallow. Watch closely for changes in your health, and be sure to contact your doctor if:    · You do not get better as expected. Where can you learn more? Go to http://www.gray.com/  Enter F981 in the search box to learn more about \"Gastrointestinal Bleeding: Care Instructions. \"  Current as of: June 26, 2019               Content Version: 12.6  © 7318-3592 [employer]. Care instructions adapted under license by [employer] (which disclaims liability or warranty for this information). If you have questions about a medical condition or this instruction, always ask your healthcare professional. [address] any warranty or liability for your use of this information. Signed:   Saw Dennis NP  10/5/2020  11:35 AM

## 2020-10-05 NOTE — DISCHARGE SUMMARY
Discharge Summary       PATIENT ID: Inder Rashid  MRN: [id number]   YOB: 1930    DATE OF ADMISSION: 10/1/2020 10:00 AM    DATE OF DISCHARGE: 10/5/2020   PRIMARY CARE PROVIDER: Chandni Julian MD     ATTENDING PHYSICIAN: Nadine Isaac MD  DISCHARGING PROVIDER: Matt Lozada NP    To contact this individual call [phone] and ask the  to page. If unavailable ask to be transferred the Adult Hospitalist Department. CONSULTATIONS: IP CONSULT TO GASTROENTEROLOGY  IP CONSULT TO GASTROENTEROLOGY  IP CONSULT TO GASTROENTEROLOGY  IP CONSULT TO CARDIOLOGY    PROCEDURES/SURGERIES: * No surgery found *    ADMITTING DIAGNOSES & HOSPITAL COURSE:     GIB, COPD, SANIYA, Chronic Hypoxic Respiratory Failure and Elevated troponin    This is a 80 y.o. female with a PMH CVA with Cerenral Thrombosis, HECTOR, HLD, Debility, Alzheimer's, COPD- uses O2 at 2l as needed and Chronic Respiratory Failure who presents with GI bleed, she came to the [facility] she had 2 episodes of coffee-ground emesis and one episode of blood in her stool per the facility she came from- lives at [facility] reports that she feels nauseous and-her mouth feels like \"paper\". Patient is somewhat confused with baseline dementia. Patient came to the ER and was requested to be admitted to the hospital service. The patient denies any Headache, blurry vision, sore throat, trouble swallowing, trouble with speech, chest pain, SOB, cough, fever, chills, abd pain, urinary symptoms, constipation, recent travels, sick contacts, focal or generalized neurological symptoms,  falls, injuries, rashes, contact with COVID-19 diagnosed patients, melena, hemoptysis, hematuria, rashes, denies starting any new medications and denies any other concerns or problems besides as mentioned above.     DISCHARGE DIAGNOSES / PLAN:      GI bleed: Upper versus lower, coffee-ground emesis, melena: resolved, on admit Hgb 12.6-->10.5, INR 1.  -telemetry bed  -Protonix twice daily  -IV hydration  -tolerating regular diet   -GI consult  -monitor Hgb, transfuse to keep Hgb >7.0  -f/u GI out patient     Elevated troponin: peaked at 0.09  -EKG: no acute changes  -ECHO  -Cardiology consult, f/u out patient     COPD: Patient does not appear to be in exacerbation.  -albuterol as needed  -ABG     Acute kidney injury: Likely secondary above  -IV hydration   -avoid nephrotoxic medication  -renally dose all medication     Chronic hypoxic respiratory failure: Continue home oxygen and continue monitor  Alzheimer's: continue home meds. ADDITIONAL CARE RECOMMENDATIONS:     You have been deemed stable for discharge and are being discharged back to your [facility]. Should you need medication refills beyond what we have prescribed for you, you will need to contact your primary care provider for refills. Return to the ER or notify your primary care office if you have a fever greater than 101.5 associated with chills, chest pain, or signs and symptoms of a stroke which are:  B E F A S T  -loss of balance, headaches or dizziness  -eyes blurred vision (sudden)  -asymmetrical facial symmetry (side of face droops)  -arms and leg weakness  -slurred speech / difficulty speaking  -if you experience any of these (time to call for an ambulance)    PENDING TEST RESULTS:   At the time of discharge the following test results are still pending: none.      FOLLOW UP APPOINTMENTS:    Follow-up Information     Follow up With Specialties Details Why Contact Info    Liz Kidd MD Internal Medicine In 3 days  [address]  [address] [facility]  [phone]      Kate Esparza MD Gastroenterology In 1 week  [address] [facility]      Cris Grady MD Cardiology In 2 weeks Cardiology [address] [facility]  [address] [facility] [phone]             DIET: Cardiac Diet      ACTIVITY: Activity as tolerated        DISCHARGE MEDICATIONS:  Current Discharge Medication List      START taking these medications    Details   pantoprazole (Protonix) 40 mg tablet Take 1 Tab by mouth daily. Qty: 30 Tab, Refills: 0         CONTINUE these medications which have NOT CHANGED    Details   clonazePAM (KlonoPIN) 0.5 mg tablet Take 0.25 mg by mouth daily. aspirin delayed-release (ECOTRIN LOW STRENGTH) 81 mg tablet Take 1 Tab by mouth daily. Qty: 30 Tab, Refills: 12    Associated Diagnoses: Cerebral thrombosis with cerebral infarction ([facility]      diclofenac (VOLTAREN) 1 % gel Apply  to affected area two (2) times daily as needed for Pain. Qty: 100 g, Refills: 2    Associated Diagnoses: Primary osteoarthritis of both knees      fluticasone furoate-vilanteroL (Breo Ellipta) 100-25 mcg/dose inhaler Take 1 Puff by inhalation daily as needed (SOB). Qty: 1 Inhaler, Refills: 3    Associated Diagnoses: Chronic obstructive pulmonary disease, unspecified COPD type (HCC)      memantine (NAMENDA) 10 mg tablet TAKE ONE TABLET BY MOUTH 2 TIMES A DAY  Qty: 180 Tab, Refills: 0    Associated Diagnoses: Late onset Alzheimer's disease without behavioral disturbance (HCC)      azelastine (OPTIVAR) 0.05 % ophthalmic solution Administer 1 Drop to right eye two (2) times daily as needed (allergy). Use in affected eye(s)  Qty: 6 mL, Refills: 1    Associated Diagnoses: Urticaria      acetaminophen (TYLENOL) 500 mg tablet Take 1 Tab by mouth two (2) times daily as needed for Pain. Qty: 60 Tab, Refills: 3    Associated Diagnoses: Pain of left hip joint      calcium citrate-vitamin d3 (CITRACAL+D) 315-200 mg-unit tab Take 1 Tab by mouth daily (with breakfast). Qty: 30 Tab, Refills: 12    Associated Diagnoses: Post-menopause               NOTIFY YOUR PHYSICIAN FOR ANY OF THE FOLLOWING:   Fever over 101 degrees for 24 hours. Chest pain, shortness of breath, fever, chills, nausea, vomiting, diarrhea, change in mentation, falling, weakness, bleeding.  Severe pain or pain not relieved by medications. Or, any other signs or symptoms that you may have questions about. DISPOSITION:    Home With:   OT  PT  HH  RN      X Long term SNF/Inpatient Rehab: detention    Independent/assisted living    Hospice    Other:       PATIENT CONDITION AT DISCHARGE:     Functional status    Poor    X Deconditioned     Independent      Cognition     Lucid    X Forgetful     Dementia      Catheters/lines (plus indication)    Cain     PICC     PEG    X None      Code status   X  Full code     DNR      PHYSICAL EXAMINATION AT DISCHARGE:    Constitutional:  No acute distress, cooperative, pleasant    ENT:  Oral mucosa moist.    Resp:  CTA bilaterally. No wheezing/rhonchi/rales. No accessory muscle use. CV:  Regular rhythm, normal rate, no murmurs, gallops, rubs.    GI:  Soft, non distended, non tender, no guarding, BS present.    Musculoskeletal:  No edema, warm, 2+ pulses throughout.    Neurologic:  Moves all extremities. AAOx3, CN II-XII reviewed. Skin:  Good turgor, no rashes or ulcers  Psych:  Poor insight, Not anxious nor agitated.            CHRONIC MEDICAL DIAGNOSES:  Problem List as of 10/5/2020 Date Reviewed: 11/11/2019          Codes Class Noted - Resolved    COPD (chronic obstructive pulmonary disease) ([facility] ICD-10-CM: J44.9  ICD-9-CM: 478  4/1/2013 - Present        Chronic respiratory failure with hypoxia ([facility] ICD-10-CM: J96.11  ICD-9-CM: 518.83, 799.02  4/1/2013 - Present        Sinus tachycardia ICD-10-CM: R00.0  ICD-9-CM: 427.89  4/1/2013 - Present        GIB (gastrointestinal bleeding) ICD-10-CM: K92.2  ICD-9-CM: 578.9  10/3/2020 - Present        GI bleed ICD-10-CM: K92.2  ICD-9-CM: 578.9  10/1/2020 - Present        Late onset Alzheimer's disease without behavioral disturbance ([facility] ICD-10-CM: G30.1, F02.80  ICD-9-CM: 331.0, 294.10  6/20/2017 - Present        Hereditary chorea ([facility] ICD-10-CM: G10  ICD-9-CM: 333.4  1/17/2017 - Present        UTI (urinary tract infection) ICD-10-CM: N39.0  ICD-9-CM: 599.0 11/14/2016 - Present        Nausea and vomiting ICD-10-CM: R11.2  ICD-9-CM: 787.01  11/12/2016 - Present        Debility ICD-10-CM: R53.81  ICD-9-CM: 799.3  10/20/2015 - Present        Dyslipidemia ICD-10-CM: E78.5  ICD-9-CM: 272.4  4/9/2015 - Present        S/P knee replacement ICD-10-CM: L76.797  ICD-9-CM: V43.65  8/30/2014 - Present    Overview Signed 8/30/2014  3:48 PM by Clif Jama MD     Left knee             Obstructive sleep apnea ICD-10-CM: G47.33  ICD-9-CM: 327.23  8/30/2014 - Present        Cerebral thrombosis with cerebral infarction [facility]) ICD-10-CM: I63.30  ICD-9-CM: 434.01  6/16/2011 - Present    Overview Signed 6/16/2011  6:14 PM by Valente Jaeger.     2 small right cerebellar infarcts on MRI             RESOLVED: Left knee DJD (Chronic) ICD-10-CM: M17.12  ICD-9-CM: 715.96  3/28/2013 - 8/30/2014              Greater than 30 minutes were spent with the patient on counseling and coordination of care    Signed:   Nevaeh Richard NP  10/5/2020  11:59 AM

## 2020-10-05 NOTE — PROGRESS NOTES
LANI PLAN:    RUR-14%    Patient will be discharged back to the [facility]. Patient's daughter will provide transportation. 2nd IM Letter signed by patient's daughter and placed in patient's chart     notified Sergio Oreilly at [facility] of discharge and faxed d/c summary/instructions and Covid test result to [phone] as requested. No further discharge needs identified.     Ivis Monge MSA, RN, CRM

## 2020-10-05 NOTE — TELEPHONE ENCOUNTER
Patient's daughter would like to have [employer] access to the patient's chart. Please call Jayde Bridges at [phone]

## 2020-10-05 NOTE — ROUTINE PROCESS
Attempted to schedule PCP [facility] appt with Dr. Trisha Bull, however the office is only providing virtual appts. Patient is unable to participate in telemed appts. [facility] appt has been scheduled in order for patient to have an in-person appt. Hospital follow-up visit has been scheduled with [facility] for 10/9/2020.   Office will contact patient prior to arrival.  Bentley Aquino, Care Management Specialist.

## 2020-10-06 ENCOUNTER — PATIENT OUTREACH (OUTPATIENT)
Dept: CASE MANAGEMENT | Age: 85
End: 2020-10-06

## 2020-10-06 NOTE — ACP (ADVANCE CARE PLANNING)
Existing Advanced Care Plan document only has a single witness to the patient signature- and is not a valid document. The pt had check marked boxes indicating she would not want CPR or vent support- and would benefit to have a DDNR completed.  Discussed with pt daughter Michelle Davison to have updated ACP and DDNR completed at earliest convenience with PCP

## 2020-10-06 NOTE — PROGRESS NOTES
Patient contacted regarding COVID-19 risk. Discussed COVID-19 related testing which was not performed at this time. . Outreach made within 2 business days of discharge: yes    Care Transition Nurse/ Ambulatory Care Manager/ LPN Care Coordinator contacted the pt by telephone to perform post discharge assessment. Verified name and  with pt as identifiers. Provided introduction to self, and explanation of the CTN/ACM/LPN role, and reason for call due to risk factors for infection and/or exposure to COVID-19. Symptoms reviewed with pt who verbalized the following symptoms: still weak- no Covid symptoms. Due to no symptoms encounter was not routed to provider for escalation. Discussed follow-up appointments. If no appointment was previously scheduled, appointment scheduling offered: no- daughter prefers to make appointments  [facility] follow up appointment(s): No future appointments. Non-[facility] follow up appointment(s): no     Advance Care Planning:   Does patient have an Advance Directive: see ACP notes    Patient has following risk factors of: COPD. CTN/ACM/LPN reviewed discharge instructions, medical action plan and red flags such as increased shortness of breath, increasing fever and signs of decompensation with pt who verbalized understanding. Discussed exposure protocols and quarantine with CDC Guidelines What to do if you are sick with coronavirus disease 2019.  pt was given an opportunity for questions and concerns. The pt agrees to contact the [facility] exposure line [phone], local health department  and PCP office for questions related to their healthcare. CTN/ACM provided contact information for future needs. Reviewed and educated pt on any new and changed medications related to discharge diagnosis. Patient/family/caregiver given information for [employer] and agrees to enroll no    14 day call based on severity of symptoms and risk factors.

## 2020-10-06 NOTE — ADT AUTH CERT NOTES
Gastrointestinal Bleeding, Upper - Care Day 2 (10/4/2020) by Duy Addison RN  
 
   
Review Status  Review Entered Completed  10/6/2020 15:25   
   
Criteria Review Care Day: 4 Care Date: 10/4/2020 Level of Care: Inpatient Floor Guideline Day 2 Level Of Care (X) Floor 10/6/2020 15:25:41 EDT by Marylene Clas   
  floor Clinical Status ( ) * Hypotension absent   
( ) * Bleeding absent or reduced Routes ( ) * Oral hydration and diet tolerated Interventions ( ) * NG tube absent   
(X) Hgb/Hct   
10/6/2020 15:25:41 EDT by Marylene Clas   
  Results for Keren Love (MRN [id number]) as of 10/6/2020 15:25 
 
10/4/2020 01:49 HGB: 9.2 (L) HCT: 29.2 (L) Medications (X) Proton pump inhibitor 10/6/2020 15:25:41 EDT by Marylene Clas   
  IV protonix * Milestone Additional Notes Assessment & Plan:   
    
GI bleed: Upper versus lower, coffee-ground emesis, melena: resolved, on admit Hgb 12.6-->10.5, INR 1.   
-telemetry bed   
-Protonix twice daily   
-IV hydration   
-tolerating regular diet   
-GI consult   
-monitor Hgb, transfuse to keep Hgb >7.0   
    
Elevated troponin: peaked at 0.09   
-EKG: no acute changes -ECHO   
-Cardiology consult   
    
COPD: Patient does not appear to be in exacerbation.   
-albuterol as needed -ABG   
    
Acute kidney injury: Likely secondary above -IV hydration    
-avoid nephrotoxic medication   
-renally dose all medication   
    
Chronic hypoxic respiratory failure: Continue home oxygen and continue monitor Alzheimer's: continue home meds.   
    
DVTppx: SCDs Gippx: Protonix Code Status: Full Code Diet: Cardiac Activity: OOB to chair TID and PRN Discharge: TBD Visit Vitals BP (!) 156/73 (BP 1 Location: Left arm, BP Patient Position: At rest) Pulse 89 Temp 98.1 °F (36.7 °C) Resp 16 Wt 77.2 kg (170 lb 1.6 oz) SpO2 95% BMI 31.11 kg/m²   
    
    
    
 Intake/Output Summary (Last 24 hours) at 10/4/2020 1221 Last data filed at 10/4/2020 3928 Gross per 24 hour Intake 1050 ml Output 1075 ml Net -25 ml   
    
    
Physical Examination:   
    
Constitutional:  No acute distress, cooperative, pleasant    
ENT:  Oral mucosa moist.   
Resp:  CTA bilaterally. No wheezing/rhonchi/rales. No accessory muscle use. CV:  Regular rhythm, normal rate, no murmurs, gallops, rubs.   
 GI:  Soft, non distended, non tender, no guarding, BS present.   
 Musculoskeletal:  No edema, warm, 2+ pulses throughout.   
 Neurologic:  Moves all extremities. AAOx3, CN II-XII reviewed. Skin:  Good turgor, no rashes or ulcers Psych:  Poor insight, Not anxious nor agitated.   
    
    
    
Data Review:   
Esther Houser and/or order of clinical lab test   
    
    
Labs:   
    
Recent Labs   
  10/04/20   
0149 10/03/20   
1937 WBC 8.3 8.8 HGB 9.2* 9.6* HCT 29.2* 30.6*  149*   
    
Recent Labs   
  10/04/20   
0149 10/03/20   
0519 10/02/20   
0045  142 144   
K 3.7 3.6 3.8  109* 110* CO2 28 28 27 BUN 13 17 32* CREA 0.90 0.93 1.04* GLU 97 95 92 CA 8.1* 8.5 8.9   
    
Medications,   
pantoprazole (PROTONIX) 40 mg in 0.9% sodium chloride 10 mL injection     
Dose: 40 mg   
Freq: EVERY 12 HOURS Route: IV Start: 10/01/20 2300 End: 10/05/20 1704   
  
0.9% sodium chloride infusion     
Rate: 75 mL/hr Dose: 75 mL/hr Freq: CONTINUOUS Route: IV Start: 10/01/20 1700 End: 10/05/20 1704   
   
Gastrointestinal Bleeding, Upper - Care Day 1 (10/3/2020) by Ana Tran RN  
 
   
Review Status  Review Entered Completed  10/3/2020 14:34   
   
Criteria Review Care Day: 3 Care Date: 10/3/2020 Level of Care: Telemetry Guideline Day 1 Level Of Care (X) ICU [C] or floor Clinical Status ( ) * Clinical Indications met [D] Activity   
(X) Bed rest or up to chair 10/3/2020 14:34:10 EDT by Ana Tran   ambulate with assistance Routes (X) IV fluids, medications 10/3/2020 14:34:10 EDT by Lara Carcamo NS @ 75cc/hr. Interventions (X) CBC   
10/3/2020 14:34:10 EDT by Lisandro Adan   
  Labs:  cl 109.   Wbc 8.9.  h/ 9.6/ 30.6. Medications (X) IV proton pump inhibitor 10/3/2020 14:34:10 EDT by Lara Carcamo protonix  40mg iv  G62 * Milestone Additional Notes 10/3/20 Vs:  98.2,  82,  104/49  20 sat 98%  [place] Labs:  cl 109.   Wbc 8.9.  h/ 9.6/ 30.6. Follow-up for GIB, COPD, SANIYA, Chronic Hypoxic Respiratory Failure and Elevated troponin.   
-Patient seen and examined, no c/o's.     
 GI: Soft, non distended, non tender, no guarding, BS present. Plan GI bleed: Upper versus lower, coffee-ground emesis, melena; hgb 12.6-->10.5, INR 1.   
-telemetry bed,   
-Protonix twice daily   
-IV hydration   
-NPO   
-GI consult   
-monitor Hgb, transfuse to keep Hgb >7.0   
-advance diet as tolerated Diet  change to cardiac regular on 10/3   
  
    
Elevated troponin: peaked at 0.09   
-EKG: no acute changes -ECHO   
-Cardiology consult   
    
COPD: Patient does not appear to be in exacerbation.   
-albuterol as needed -ABG   
    
Acute kidney injury: Likely secondary above -IV hydration   
-avoid nephrotoxic medication   
-renally dose all medication   
    
Chronic hypoxic respiratory failure: Continue home oxygen and continue monitor Alzheimer's: continue home meds.   
    
DVTppx: SCDs Gippx: Protonix Code Status: Full Code Diet: Cardiac Activity: OOB to chair TID and PRN Discharge: TBD   
    
  
   
Gastrointestinal Bleeding, Upper - Care Day (10/2/2020) by Josie Allred Review Status  Review Entered Completed  10/2/2020 14:05   
   
Criteria Review Care Day: 2 Care Date: 10/2/2020 Level of Care: Telemetry Guideline Day 1 Level Of Care (X) ICU [C] or floor 10/2/2020 14:05:16 EDT by Contreras Nguyen   
  telemetry Clinical Status ( ) * Clinical Indications met [D] Activity   
(X) Bed rest or up to chair 10/2/2020 14:05:16 EDT by Susan Has Ambulate with assistance Routes (X) IV fluids, medications 10/2/2020 14:05:16 EDT by Contreras Nguyen   
  0.9% sod chlor cont iv 75cc/hr Medications (X) IV proton pump inhibitor 10/2/2020 14:05:16 EDT by Contreras Nguyen   
  Protonix 40mg iv x1   
* Milestone Additional Notes 97.7-130/79-87-18-97% 3L NC Interval history / Subjective: Follow-up for GIB, COPD, SANIYA, Chronic Hypoxic Respiratory Failure and Elevated troponin.   
-Patient seen and examined, no c/o's, Message left for daughter Jean Quiroz to update on patient stable status and to discuss ACP.     
  
Assessment/plan:   
GI bleed: Upper versus lower, coffee-ground emesis, melena; hgb 12.6-->10.5, INR 1.   
-telemetry bed,   
-Protonix twice daily   
-IV hydration   
-NPO   
-GI consult   
-monitor Hgb, transfuse to keep Hgb >7.0   
    
Elevated troponin: peaked at 0.09   
-EKG   
-ECHO   
-Cardiology consult   
    
COPD: Patient does not appear to be in exacerbation.   
-albuterol as needed -ABG   
    
Acute kidney injury: Likely secondary above -IV hydration   
-avoid nephrotoxic medication   
-renally dose all medication   
    
Chronic hypoxic respiratory failure: Continue home oxygen and continue monitor Alzheimer's: continue home meds.   
    
DVTppx: SCDs Gippx: Protonix Physical exam:   
Constitutional: No acute distress, cooperative, pleasant ENT: Oral mucosa moist.   
Resp: CTA bilaterally. No wheezing/rhonchi/rales. No accessory muscle use. CV: Regular rhythm, normal rate, no murmurs, gallops, rubs.   
 GI: Soft, non distended, non tender, no guarding, BS present.   
 Musculoskeletal: No edema, warm, 2+ pulses throughout.   
 Neurologic: Moves all extremities. AAOx3, CN II-XII reviewed. Skin:  Good turgor, no rashes or ulcers Psych:  Poor insight, Not anxious nor agitated HgbA1c 10.5 bun 32 creat 1.04 alt 11 Spot check pulse ox Consult cardiology Clear liquids Cardiac monitoring Cardiac/resp monitoring Albuterol IN x3   
  
  
   
Gastrointestinal Bleeding, Upper - Care Day (10/1/2020) by Jesica Segura RN  
 
   
Review Status  Review Entered Completed  10/2/2020 08:17   
   
Criteria Review Care Day: 1 Care Date: 10/1/2020 Level of Care: Telemetry Guideline Day 1 Level Of Care (X) ICU [C] or floor 10/2/2020 08:17:46 EDT by Jose Zheng   
  telemetry Clinical Status ( ) * Clinical Indications met [D]   
10/2/2020 08:17:46 EDT by Jose Zheng   
  79 y/o admitted with GI Bleed Routes (X) IV fluids, medications 10/2/2020 08:17:46 EDT by Kal HENNING IVF Medications (X) IV proton pump inhibitor 10/2/2020 08:17:46 EDT by Jose Zheng   
  IV protonix * Milestone Additional Notes 78-year-old female with a history of COPD on 2 L of oxygen PRN, dementia, stroke was sent from her facility for 2 episodes of coffee-ground emesis and one episode of blood in her stool.  Patient is reportedly at her baseline level of confusion and she has no complaints.  She says her abdomen does not hurt.  She says her mouth feels like paper. Sanjana Jarrett was taken off for 2 L when she first arrived and her oxygen saturation dropped into the 80s, so she is back on 2 L of oxygen via nasal cannula.  She takes 81 mg of aspirin daily Jayson Brand is a 80 y.o. female who presents with GI bleed   
    
Patient is a poor historian, history was panel obtained from chart review, patient apparently came to the ER because she had 2 episodes of coffee-ground emesis and one episode of blood in her stool per the facility.  Patient lives at [facility], came in today by EMS, with above-mentioned complaint. Domingo Vargas reports that she feels nauseous and-her mouth feels like \"paper\".  Patient is somewhat confused but appears to have baseline dementia.  Patient has COPD and per chart uses 2 L oxygen as needed.  Patient came to the ER and was requested to be admitted to the hospital service.  No further history could be obtained from the patient are to chart review. Hematological: Bruises/bleeds easily. Psychiatric/Behavioral: Negative for sleep disturbance.   
    
    
 Visit Vitals /67 Pulse (!) 105 Temp 99.2 °F (37.3 °C) Resp 21 SpO2 96%   
 O2 Flow Rate (L/min): 2 l/min O2 Device: Nasal cannula   
  
    
Physical Exam   
Constitutional:     
   General: She is not in acute distress.   
   Appearance: Normal appearance. She is not ill-appearing or toxic-appearing. HENT:   
   Head: Normocephalic.   
   Nose: Nose normal.   
   Mouth/Throat:   
   Mouth: Mucous membranes are dry. Eyes:   
   Extraocular Movements: Extraocular movements intact.   
   Conjunctiva/sclera: Conjunctivae normal.   
Cardiovascular:   
   Rate and Rhythm: Tachycardia present. Pulmonary:   
   Effort: Pulmonary effort is normal. No respiratory distress. Abdominal:   
   General: Abdomen is flat.   
   Palpations: Abdomen is soft.   
   Tenderness: There is no abdominal tenderness. There is no guarding. Musculoskeletal: Normal range of motion. Skin:   
   Findings: No rash. Neurological:   
   Mental Status: She is alert. Mental status is at baseline. She is disoriented. Psychiatric:       
   Behavior: Behavior normal.   
    
 Results for Rubbie Masoud (MRN [id number]) as of 10/2/2020 08:18   
  
10/1/2020 10:29 WBC: 8.4 NRBC: 0.0   
RBC: 4.31   
HGB: 12.6 HCT: 39.6   
  
10/1/2020 19:06 HGB: 11.1 (L) Results for Rubbie Masoud (MRN [id number]) as of 10/2/2020 08:18   
  
10/1/2020 10:29 Sodium: 138 Potassium: 4.5 Chloride: 107 CO2: 24 Anion gap: 7 Glucose: 113 (H) BUN: 46 (H) Creatinine: 1.29 (H) BUN/Creatinine ratio: 36 (H) Calcium: 9.2 GFR est non-AA: 39 (L) GFR est AA: 47 (L) Bilirubin, total: 0.5 Protein, total: 7.6 Albumin: 3.2 (L) Globulin: 4.4 (H) A-G Ratio: 0.7 (L) ALT: 14 AST: 16 Alk. phosphatase: 86   
  
10/1/2020 19:06 Troponin-I, Qt.: 0.07 (H) EKG-Sinus tachycardia Inferior infarct (cited on or before 11-MAR-2013) When compared with ECG of 11-MAR-2013 13:12, No significant change was found Confirmed by Ailyn Javier MD, Gumaro Gee ([id number]) on 10/1/2020 11:15:57 PM   
  
Medications,   
0.9% sodium chloride infusion     
Rate: 75 mL/hr Dose: 75 mL/hr Freq: CONTINUOUS Route: IV Start: 10/01/20 1700   
  
ondansetron (ZOFRAN) injection 4 mg     
Dose: 4 mg Freq: EVERY 4 HOURS AS NEEDED Route: IV x1 PRN Reason: Nausea or Vomiting   
  
pantoprazole (PROTONIX) 40 mg in 0.9% sodium chloride 10 mL injection     
Dose: 40 mg   
Freq: EVERY 12 HOURS Route: IV Start: 10/01/20 2300   
  
pantoprazole (PROTONIX) 80 mg in 0.9% sodium chloride 20 mL injection     
Dose: 80 mg   
Freq: ONCE Route: IV Start: 10/01/20 1040 End: 10/01/20 1101   
  
sodium chloride 0.9 % bolus infusion 1,000 mL     
Dose: 1,000 mL Freq: NOW Route: IV Start: 10/01/20 1040 End: 10/01/20 1131 Assessment/Plan   
    
GI bleed: Upper versus lower, patient will be observed on a telemetry bed, Protonix twice daily, IV hydration, n.p.o., GI consult, supportive care, close monitoring, reassess as needed, further intervention per hospital course, monitor H&H   
    
COPD: Patient does not appear to be in exacerbation, albuterol as needed, ABG, supportive care and close monitoring   
    
Acute kidney injury: Likely secondary above, IV hydration, avoid nephrotoxic medication, renally dose all medication, continue to monitor, reassess as needed, further intervention per hospital course   
    
Chronic hypoxic respiratory failure: Continue home oxygen and continue monitor   
    
GI DVT prophylaxis: Patient will be on SCDs     
  
  
   
Gastrointestinal Bleeding, Upper - Clinical Indications for Admission to Inpatient Care by Laine Retana RN  
 
   
Review Status  Review Entered Completed  10/2/2020 08:15   
   
Criteria Review Clinical Indications for Admission to Inpatient Care Most Recent : Mikael Crocker Most Recent Date: 10/2/2020 08:15:45 EDT ( ) Admission is indicated for  1 or more  of the following  (1) (2) (3) (4):   
   ( ) Ongoing bleeding (eg, fresh blood in emesis or nasogastric aspirate, decreasing hematocrit)   
   10/2/2020 08:15:45 EDT by Mikael Crocker   
     80year-old female with a history of COPD on 2 L of oxygen PRN, dementia, stroke was sent from her facility for 2 episodes of coffee-ground emesis and one episode of blood in her stool

## 2020-10-07 NOTE — TELEPHONE ENCOUNTER
Returned Carmel's call and after verifying HIPAA provided her proxy access to her mother's [employer] account.

## 2020-10-19 ENCOUNTER — PATIENT OUTREACH (OUTPATIENT)
Dept: CASE MANAGEMENT | Age: 85
End: 2020-10-19

## 2020-10-28 DIAGNOSIS — G10 HEREDITARY CHOREA (HCC): Primary | ICD-10-CM

## 2020-10-29 RX ORDER — CLONAZEPAM 0.5 MG/1
0.25 TABLET ORAL 2 TIMES DAILY
Qty: 30 TAB | Refills: 2 | Status: SHIPPED | OUTPATIENT
Start: 2020-10-29 | End: 2020-11-27 | Stop reason: SDUPTHER

## 2020-11-27 DIAGNOSIS — G10 HEREDITARY CHOREA (HCC): ICD-10-CM

## 2020-11-27 RX ORDER — CLONAZEPAM 0.5 MG/1
0.25 TABLET ORAL 2 TIMES DAILY
Qty: 30 TAB | Refills: 0 | Status: SHIPPED | OUTPATIENT
Start: 2020-11-27 | End: 2021-01-28 | Stop reason: SDUPTHER

## 2021-01-28 ENCOUNTER — VIRTUAL VISIT (OUTPATIENT)
Dept: INTERNAL MEDICINE CLINIC | Age: 86
End: 2021-01-28
Payer: MEDICARE

## 2021-01-28 DIAGNOSIS — G30.1 LATE ONSET ALZHEIMER'S DISEASE WITHOUT BEHAVIORAL DISTURBANCE (HCC): ICD-10-CM

## 2021-01-28 DIAGNOSIS — G10 HEREDITARY CHOREA (HCC): ICD-10-CM

## 2021-01-28 DIAGNOSIS — J44.9 CHRONIC OBSTRUCTIVE PULMONARY DISEASE, UNSPECIFIED COPD TYPE (HCC): ICD-10-CM

## 2021-01-28 DIAGNOSIS — K29.71 GASTROINTESTINAL HEMORRHAGE ASSOCIATED WITH GASTRITIS, UNSPECIFIED GASTRITIS TYPE: Primary | ICD-10-CM

## 2021-01-28 DIAGNOSIS — E55.9 VITAMIN D DEFICIENCY: ICD-10-CM

## 2021-01-28 DIAGNOSIS — R73.03 PREDIABETES: ICD-10-CM

## 2021-01-28 DIAGNOSIS — F02.80 LATE ONSET ALZHEIMER'S DISEASE WITHOUT BEHAVIORAL DISTURBANCE (HCC): ICD-10-CM

## 2021-01-28 PROCEDURE — G8510 SCR DEP NEG, NO PLAN REQD: HCPCS | Performed by: INTERNAL MEDICINE

## 2021-01-28 PROCEDURE — 99214 OFFICE O/P EST MOD 30 MIN: CPT | Performed by: INTERNAL MEDICINE

## 2021-01-28 PROCEDURE — G8536 NO DOC ELDER MAL SCRN: HCPCS | Performed by: INTERNAL MEDICINE

## 2021-01-28 PROCEDURE — G8417 CALC BMI ABV UP PARAM F/U: HCPCS | Performed by: INTERNAL MEDICINE

## 2021-01-28 PROCEDURE — 1100F PTFALLS ASSESS-DOCD GE2>/YR: CPT | Performed by: INTERNAL MEDICINE

## 2021-01-28 PROCEDURE — 3288F FALL RISK ASSESSMENT DOCD: CPT | Performed by: INTERNAL MEDICINE

## 2021-01-28 PROCEDURE — G8427 DOCREV CUR MEDS BY ELIG CLIN: HCPCS | Performed by: INTERNAL MEDICINE

## 2021-01-28 PROCEDURE — 1090F PRES/ABSN URINE INCON ASSESS: CPT | Performed by: INTERNAL MEDICINE

## 2021-01-28 RX ORDER — PANTOPRAZOLE SODIUM 40 MG/1
40 TABLET, DELAYED RELEASE ORAL DAILY
Qty: 30 TAB | Refills: 3 | Status: SHIPPED | OUTPATIENT
Start: 2021-01-28 | End: 2021-07-08 | Stop reason: ALTCHOICE

## 2021-01-28 RX ORDER — MEMANTINE HYDROCHLORIDE 10 MG/1
10 TABLET ORAL 2 TIMES DAILY
Qty: 180 TAB | Refills: 1 | Status: SHIPPED | OUTPATIENT
Start: 2021-01-28 | End: 2021-07-08 | Stop reason: SDUPTHER

## 2021-01-28 RX ORDER — CLONAZEPAM 0.5 MG/1
0.25 TABLET ORAL 2 TIMES DAILY
Qty: 30 TAB | Refills: 2 | Status: SHIPPED | OUTPATIENT
Start: 2021-01-28 | End: 2021-06-01 | Stop reason: SDUPTHER

## 2021-01-28 RX ORDER — FLUTICASONE FUROATE AND VILANTEROL TRIFENATATE 100; 25 UG/1; UG/1
1 POWDER RESPIRATORY (INHALATION) DAILY
Qty: 1 INHALER | Refills: 3 | Status: SHIPPED | OUTPATIENT
Start: 2021-01-28 | End: 2021-07-08 | Stop reason: SDUPTHER

## 2021-01-28 NOTE — PROGRESS NOTES
Amirah Montoya is a 80 y.o. female who was seen by synchronous (real-time) audio-video technology on 1/28/2021 for Cholesterol Problem, Dementia, and COPD        Assessment & Plan:   Diagnoses and all orders for this visit:    1. Gastrointestinal hemorrhage associated with gastritis, unspecified gastritis type    Has recent history of GI bleeding. No further bleeding. Will refill,  -     pantoprazole (Protonix) 40 mg tablet; Take 1 Tab by mouth daily. 2. Chronic obstructive pulmonary disease, unspecified COPD type ([address]    We will refill,  -     fluticasone furoate-vilanteroL (Breo Ellipta) 100-25 mcg/dose inhaler; Take 1 Puff by inhalation daily. 3. Late onset Alzheimer's disease without behavioral disturbance (HCC)  -     memantine (NAMENDA) 10 mg tablet; Take 1 Tab by mouth two (2) times a day. 4. Hereditary chorea ([facility] [address]    Doing well. Will refill,  -     clonazePAM (KlonoPIN) 0.5 mg tablet; Take 0.5 Tabs by mouth two (2) times a day. Max Daily Amount: 0.5 mg.  -     CBC WITH AUTOMATED DIFF; Future  -     METABOLIC PANEL, COMPREHENSIVE; Future    5. Vitamin D deficiency    We will recheck,  -     VITAMIN D, 25 HYDROXY; Future    6. Prediabetes    Be on low-carb diet. Will check,  -     METABOLIC PANEL, COMPREHENSIVE; Future  -     LIPID PANEL; Future  -     HEMOGLOBIN A1C WITH EAG; Future        I spent at least 40 minutes on this visit with this established patient. Subjective:     Ms. Guille Bishop is here for follow-up. She is living and in [facility] assisted living with her . There is 24-hour nursing care over there. She is a doing well. Has very recent history of GI bleeding for which she was sent to [facility] [place].  She has been taking Protonix since then. No active bleeding noticed. Has had recovery of, has hereditary chorea, taking Klonopin which is helping her. Need refill. Has COPD, using inhaler. No cough or wheezing. No shortness of breath.   He did receive Covid vaccine yet.  Memory seems okay. Has arthritis, taking Tylenol as needed. She is eating well no other discomfort. Prior to Admission medications    Medication Sig Start Date End Date Taking? Authorizing Provider   fluticasone furoate-vilanteroL (Breo Ellipta) 100-25 mcg/dose inhaler Take 1 Puff by inhalation daily. 1/28/21  Yes Grover Fulton MD   memantine [facility]) 10 mg tablet Take 1 Tab by mouth two (2) times a day. 1/28/21  Yes Grover Fulton MD   clonazePAM (KlonoPIN) 0.5 mg tablet Take 0.5 Tabs by mouth two (2) times a day. Max Daily Amount: 0.5 mg. 1/28/21  Yes Grover Fulton MD   pantoprazole (Protonix) 40 mg tablet Take 1 Tab by mouth daily. 1/28/21  Yes Grover Fulton MD   diclofenac (VOLTAREN) 1 % gel Apply  to affected area two (2) times daily as needed for Pain. 8/25/20  Yes Grover Fulton MD   azelastine (OPTIVAR) 0.05 % ophthalmic solution Administer 1 Drop to right eye two (2) times daily as needed (allergy). Use in affected eye(s) 5/28/19  Yes Grover Fulton MD   acetaminophen (TYLENOL) 500 mg tablet Take 1 Tab by mouth two (2) times daily as needed for Pain. 10/17/17  Yes Grover Fulton MD   aspirin delayed-release (ECOTRIN LOW STRENGTH) 81 mg tablet Take 1 Tab by mouth daily. 1/17/17  Yes Grover Fulton MD   calcium citrate-vitamin d3 (CITRACAL+D) 315-200 mg-unit tab Take 1 Tab by mouth daily (with breakfast). Patient taking differently: Take 0.5 Tabs by mouth two (2) times daily (with meals). 11/22/16  Yes Grover Fulton MD   clonazePAM (KlonoPIN) 0.5 mg tablet Take 0.5 Tabs by mouth two (2) times a day. Max Daily Amount: 0.5 mg. 11/27/20 1/28/21  Brina Gerber NP   pantoprazole (Protonix) 40 mg tablet Take 1 Tab by mouth daily. 10/5/20 1/28/21  Raymundo Cordoba Lama, NP   fluticasone furoate-vilanteroL (Breo Ellipta) 100-25 mcg/dose inhaler Take 1 Puff by inhalation daily as needed (SOB). Patient taking differently: Take 1 Puff by inhalation every eight (8) hours as needed (SOB).  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,TIA    Unspecified sleep apnea     NO CPAP       ROS    Objective:     Patient-Reported Vitals 1/28/2021   Patient-Reported Weight 152lb   Patient-Reported Height -   Patient-Reported Pulse 74   Patient-Reported Temperature 97.8   Patient-Reported Systolic  504   Patient-Reported Diastolic 73          Constitutional: [x] Appears well-developed and well-nourished [x] No apparent distress      [] Abnormal -     Mental status: [x] Alert and awake  [x] Oriented to person/place/time [x] Able to follow commands    [] Abnormal -        HENT: [x] Normocephalic, atraumatic  [] Abnormal -   [x] Mouth/Throat: Mucous membranes are moist    External Ears [x] Normal  [] Abnormal -    Neck: [x] No visualized mass [] Abnormal -     Pulmonary/Chest: [x] Respiratory effort normal   [x] No visualized signs of difficulty breathing or respiratory distress        [] Abnormal -      Musculoskeletal:   [x] Normal gait with no signs of ataxia         [x] Normal range of motion of neck        [] Abnormal -     Neurological:        [x] No Facial Asymmetry (Cranial nerve 7 motor function) (limited exam due to video visit)          [x] No gaze palsy        [] Abnormal -          Skin:        [x] No significant exanthematous lesions or discoloration noted on facial skin         [] Abnormal -            Psychiatric:       [x] Normal Affect [] Abnormal -        [x] No Hallucinations    Other pertinent observable physical exam findings:-        We discussed the expected course, resolution and complications of the diagnosis(es) in detail.   Medication risks, benefits, costs, interactions, and alternatives were discussed as indicated. I advised her to contact the office if her condition worsens, changes or fails to improve as anticipated. She expressed understanding with the diagnosis(es) and plan. Larisa Agustin, who was evaluated through a patient-initiated, synchronous (real-time) audio-video encounter, and/or her healthcare decision maker, is aware that it is a billable service, with coverage as determined by her insurance carrier. She provided verbal consent to proceed: Yes, and patient identification was verified. It was conducted pursuant to the emergency declaration under the [address] [place] [address] authority and the [employer] General Act. A caregiver was present when appropriate. Ability to conduct physical exam was limited. I was in the office. The patient was at home.       Bonnie Milton MD

## 2021-01-28 NOTE — PROGRESS NOTES
Health Maintenance Due   Topic Date Due    COVID-19 Vaccine (1 of 2) 10/21/1946    DTaP/Tdap/Td series (1 - Tdap) 10/21/1951    Shingrix Vaccine Age 50> (1 of 2) 10/21/1980    GLAUCOMA SCREENING Q2Y  04/09/2017    Flu Vaccine (1) 09/01/2020       Chief Complaint   Patient presents with    Cholesterol Problem    Dementia    COPD       1. Have you been to the ER, urgent care clinic since your last visit? Hospitalized since your last visit? No    2. Have you seen or consulted any other health care providers outside of the [address] [place] [address] since your last visit? Include any pap smears or colon screening. No    3) Do you have an Advance Directive on file? no    4) Are you interested in receiving information on Advance Directives? NO      Patient is accompanied by self I have received verbal consent from Jarad Morgan to discuss any/all medical information while they are present in the room.

## 2021-02-20 ENCOUNTER — APPOINTMENT (OUTPATIENT)
Dept: GENERAL RADIOLOGY | Age: 86
End: 2021-02-20
Attending: EMERGENCY MEDICINE
Payer: MEDICARE

## 2021-02-20 ENCOUNTER — HOSPITAL ENCOUNTER (OUTPATIENT)
Age: 86
Setting detail: OBSERVATION
Discharge: HOME | End: 2021-02-23
Attending: EMERGENCY MEDICINE | Admitting: INTERNAL MEDICINE
Payer: MEDICARE

## 2021-02-20 DIAGNOSIS — A41.9 SEPSIS, DUE TO UNSPECIFIED ORGANISM, UNSPECIFIED WHETHER ACUTE ORGAN DYSFUNCTION PRESENT (HCC): Primary | ICD-10-CM

## 2021-02-20 DIAGNOSIS — N17.9 AKI (ACUTE KIDNEY INJURY) (HCC): ICD-10-CM

## 2021-02-20 DIAGNOSIS — R79.89 ELEVATED LACTIC ACID LEVEL: ICD-10-CM

## 2021-02-20 LAB
ALBUMIN SERPL-MCNC: 3.4 G/DL (ref 3.5–5)
ALBUMIN/GLOB SERPL: 0.7 {RATIO} (ref 1.1–2.2)
ALP SERPL-CCNC: 114 U/L (ref 45–117)
ALT SERPL-CCNC: 12 U/L (ref 12–78)
ANION GAP SERPL CALC-SCNC: 6 MMOL/L (ref 5–15)
APPEARANCE UR: CLEAR
AST SERPL-CCNC: 14 U/L (ref 15–37)
BACTERIA URNS QL MICRO: NEGATIVE /HPF
BASOPHILS # BLD: 0 K/UL (ref 0–0.1)
BASOPHILS NFR BLD: 0 % (ref 0–1)
BILIRUB SERPL-MCNC: 0.7 MG/DL (ref 0.2–1)
BILIRUB UR QL: NEGATIVE
BUN SERPL-MCNC: 16 MG/DL (ref 6–20)
BUN/CREAT SERPL: 13 (ref 12–20)
CALCIUM SERPL-MCNC: 9.3 MG/DL (ref 8.5–10.1)
CHLORIDE SERPL-SCNC: 103 MMOL/L (ref 97–108)
CO2 SERPL-SCNC: 30 MMOL/L (ref 21–32)
COLOR UR: NORMAL
COMMENT, HOLDF: NORMAL
CREAT SERPL-MCNC: 1.28 MG/DL (ref 0.55–1.02)
DIFFERENTIAL METHOD BLD: ABNORMAL
EOSINOPHIL # BLD: 0 K/UL (ref 0–0.4)
EOSINOPHIL NFR BLD: 0 % (ref 0–7)
EPITH CASTS URNS QL MICRO: NORMAL /LPF
ERYTHROCYTE [DISTWIDTH] IN BLOOD BY AUTOMATED COUNT: 17.4 % (ref 11.5–14.5)
GLOBULIN SER CALC-MCNC: 5.1 G/DL (ref 2–4)
GLUCOSE SERPL-MCNC: 119 MG/DL (ref 65–100)
GLUCOSE UR STRIP.AUTO-MCNC: NEGATIVE MG/DL
HCT VFR BLD AUTO: 42.2 % (ref 35–47)
HGB BLD-MCNC: 13.6 G/DL (ref 11.5–16)
HGB UR QL STRIP: NEGATIVE
HYALINE CASTS URNS QL MICRO: NORMAL /LPF (ref 0–5)
IMM GRANULOCYTES # BLD AUTO: 0 K/UL (ref 0–0.04)
IMM GRANULOCYTES NFR BLD AUTO: 0 % (ref 0–0.5)
KETONES UR QL STRIP.AUTO: NEGATIVE MG/DL
LACTATE SERPL-SCNC: 2.1 MMOL/L (ref 0.4–2)
LEUKOCYTE ESTERASE UR QL STRIP.AUTO: NEGATIVE
LYMPHOCYTES # BLD: 0.8 K/UL (ref 0.8–3.5)
LYMPHOCYTES NFR BLD: 11 % (ref 12–49)
MAGNESIUM SERPL-MCNC: 1.7 MG/DL (ref 1.6–2.4)
MCH RBC QN AUTO: 27.2 PG (ref 26–34)
MCHC RBC AUTO-ENTMCNC: 32.2 G/DL (ref 30–36.5)
MCV RBC AUTO: 84.4 FL (ref 80–99)
MONOCYTES # BLD: 0.7 K/UL (ref 0–1)
MONOCYTES NFR BLD: 10 % (ref 5–13)
NEUTS SEG # BLD: 5.7 K/UL (ref 1.8–8)
NEUTS SEG NFR BLD: 79 % (ref 32–75)
NITRITE UR QL STRIP.AUTO: NEGATIVE
NRBC # BLD: 0 K/UL (ref 0–0.01)
NRBC BLD-RTO: 0 PER 100 WBC
PH UR STRIP: 7 [PH] (ref 5–8)
PHOSPHATE SERPL-MCNC: 3 MG/DL (ref 2.6–4.7)
PLATELET # BLD AUTO: 221 K/UL (ref 150–400)
PMV BLD AUTO: 10 FL (ref 8.9–12.9)
POTASSIUM SERPL-SCNC: 3.8 MMOL/L (ref 3.5–5.1)
PROT SERPL-MCNC: 8.5 G/DL (ref 6.4–8.2)
PROT UR STRIP-MCNC: NEGATIVE MG/DL
RBC # BLD AUTO: 5 M/UL (ref 3.8–5.2)
RBC #/AREA URNS HPF: NORMAL /HPF (ref 0–5)
SAMPLES BEING HELD,HOLD: NORMAL
SODIUM SERPL-SCNC: 139 MMOL/L (ref 136–145)
SP GR UR REFRACTOMETRY: 1.02 (ref 1–1.03)
TROPONIN I SERPL-MCNC: <0.05 NG/ML
UR CULT HOLD, URHOLD: NORMAL
UROBILINOGEN UR QL STRIP.AUTO: 1 EU/DL (ref 0.2–1)
WBC # BLD AUTO: 7.3 K/UL (ref 3.6–11)
WBC URNS QL MICRO: NORMAL /HPF (ref 0–4)

## 2021-02-20 PROCEDURE — 36415 COLL VENOUS BLD VENIPUNCTURE: CPT

## 2021-02-20 PROCEDURE — 87040 BLOOD CULTURE FOR BACTERIA: CPT

## 2021-02-20 PROCEDURE — 77030019905 HC CATH URETH INTMIT MDII -A

## 2021-02-20 PROCEDURE — 99285 EMERGENCY DEPT VISIT HI MDM: CPT

## 2021-02-20 PROCEDURE — 51701 INSERT BLADDER CATHETER: CPT

## 2021-02-20 PROCEDURE — 81001 URINALYSIS AUTO W/SCOPE: CPT

## 2021-02-20 PROCEDURE — 65270000029 HC RM PRIVATE

## 2021-02-20 PROCEDURE — 85025 COMPLETE CBC W/AUTO DIFF WBC: CPT

## 2021-02-20 PROCEDURE — 71045 X-RAY EXAM CHEST 1 VIEW: CPT

## 2021-02-20 PROCEDURE — 84484 ASSAY OF TROPONIN QUANT: CPT

## 2021-02-20 PROCEDURE — 74011000258 HC RX REV CODE- 258: Performed by: EMERGENCY MEDICINE

## 2021-02-20 PROCEDURE — 83605 ASSAY OF LACTIC ACID: CPT

## 2021-02-20 PROCEDURE — 99218 HC RM OBSERVATION: CPT

## 2021-02-20 PROCEDURE — 83735 ASSAY OF MAGNESIUM: CPT

## 2021-02-20 PROCEDURE — 96367 TX/PROPH/DG ADDL SEQ IV INF: CPT

## 2021-02-20 PROCEDURE — 80053 COMPREHEN METABOLIC PANEL: CPT

## 2021-02-20 PROCEDURE — 74011250637 HC RX REV CODE- 250/637: Performed by: EMERGENCY MEDICINE

## 2021-02-20 PROCEDURE — 93005 ELECTROCARDIOGRAM TRACING: CPT

## 2021-02-20 PROCEDURE — 74011250636 HC RX REV CODE- 250/636: Performed by: EMERGENCY MEDICINE

## 2021-02-20 PROCEDURE — 96365 THER/PROPH/DIAG IV INF INIT: CPT

## 2021-02-20 PROCEDURE — 84100 ASSAY OF PHOSPHORUS: CPT

## 2021-02-20 RX ORDER — VANCOMYCIN/0.9 % SOD CHLORIDE 1.5G/250ML
1500 PLASTIC BAG, INJECTION (ML) INTRAVENOUS ONCE
Status: COMPLETED | OUTPATIENT
Start: 2021-02-20 | End: 2021-02-21

## 2021-02-20 RX ORDER — ACETAMINOPHEN 500 MG
1000 TABLET ORAL
Status: COMPLETED | OUTPATIENT
Start: 2021-02-20 | End: 2021-02-20

## 2021-02-20 RX ORDER — SODIUM CHLORIDE 0.9 % (FLUSH) 0.9 %
5-10 SYRINGE (ML) INJECTION AS NEEDED
Status: DISCONTINUED | OUTPATIENT
Start: 2021-02-20 | End: 2021-02-23 | Stop reason: HOSPADM

## 2021-02-20 RX ADMIN — ACETAMINOPHEN 1000 MG: 500 TABLET ORAL at 21:58

## 2021-02-20 RX ADMIN — VANCOMYCIN HYDROCHLORIDE 1500 MG: 10 INJECTION, POWDER, LYOPHILIZED, FOR SOLUTION INTRAVENOUS at 23:04

## 2021-02-20 RX ADMIN — PIPERACILLIN AND TAZOBACTAM 3.38 G: 3; .375 INJECTION, POWDER, LYOPHILIZED, FOR SOLUTION INTRAVENOUS at 22:00

## 2021-02-20 RX ADMIN — SODIUM CHLORIDE 1000 ML: 9 INJECTION, SOLUTION INTRAVENOUS at 21:58

## 2021-02-21 ENCOUNTER — APPOINTMENT (OUTPATIENT)
Dept: CT IMAGING | Age: 86
End: 2021-02-21
Attending: INTERNAL MEDICINE
Payer: MEDICARE

## 2021-02-21 LAB
ALBUMIN SERPL-MCNC: 3 G/DL (ref 3.5–5)
ALBUMIN/GLOB SERPL: 0.7 {RATIO} (ref 1.1–2.2)
ALP SERPL-CCNC: 92 U/L (ref 45–117)
ALT SERPL-CCNC: 9 U/L (ref 12–78)
ANION GAP SERPL CALC-SCNC: 7 MMOL/L (ref 5–15)
AST SERPL-CCNC: 16 U/L (ref 15–37)
ATRIAL RATE: 98 BPM
B PERT DNA SPEC QL NAA+PROBE: NOT DETECTED
BASOPHILS # BLD: 0 K/UL (ref 0–0.1)
BASOPHILS NFR BLD: 1 % (ref 0–1)
BILIRUB SERPL-MCNC: 0.8 MG/DL (ref 0.2–1)
BNP SERPL-MCNC: 618 PG/ML
BORDETELLA PARAPERTUSSIS PCR, BORPAR: NOT DETECTED
BUN SERPL-MCNC: 14 MG/DL (ref 6–20)
BUN/CREAT SERPL: 13 (ref 12–20)
C PNEUM DNA SPEC QL NAA+PROBE: NOT DETECTED
CALCIUM SERPL-MCNC: 8.6 MG/DL (ref 8.5–10.1)
CALCULATED P AXIS, ECG09: 43 DEGREES
CALCULATED R AXIS, ECG10: -8 DEGREES
CALCULATED T AXIS, ECG11: 88 DEGREES
CHLORIDE SERPL-SCNC: 107 MMOL/L (ref 97–108)
CO2 SERPL-SCNC: 28 MMOL/L (ref 21–32)
CREAT SERPL-MCNC: 1.09 MG/DL (ref 0.55–1.02)
D DIMER PPP FEU-MCNC: 4.04 MG/L FEU (ref 0–0.65)
DIAGNOSIS, 93000: NORMAL
DIFFERENTIAL METHOD BLD: ABNORMAL
EOSINOPHIL # BLD: 0.1 K/UL (ref 0–0.4)
EOSINOPHIL NFR BLD: 1 % (ref 0–7)
ERYTHROCYTE [DISTWIDTH] IN BLOOD BY AUTOMATED COUNT: 17.2 % (ref 11.5–14.5)
FLUAV H1 2009 PAND RNA SPEC QL NAA+PROBE: NOT DETECTED
FLUAV H1 RNA SPEC QL NAA+PROBE: NOT DETECTED
FLUAV H3 RNA SPEC QL NAA+PROBE: NOT DETECTED
FLUAV SUBTYP SPEC NAA+PROBE: NOT DETECTED
FLUBV RNA SPEC QL NAA+PROBE: NOT DETECTED
GLOBULIN SER CALC-MCNC: 4.5 G/DL (ref 2–4)
GLUCOSE SERPL-MCNC: 99 MG/DL (ref 65–100)
HADV DNA SPEC QL NAA+PROBE: NOT DETECTED
HCOV 229E RNA SPEC QL NAA+PROBE: NOT DETECTED
HCOV HKU1 RNA SPEC QL NAA+PROBE: NOT DETECTED
HCOV NL63 RNA SPEC QL NAA+PROBE: NOT DETECTED
HCOV OC43 RNA SPEC QL NAA+PROBE: NOT DETECTED
HCT VFR BLD AUTO: 40.6 % (ref 35–47)
HGB BLD-MCNC: 12.7 G/DL (ref 11.5–16)
HMPV RNA SPEC QL NAA+PROBE: NOT DETECTED
HPIV1 RNA SPEC QL NAA+PROBE: NOT DETECTED
HPIV2 RNA SPEC QL NAA+PROBE: NOT DETECTED
HPIV3 RNA SPEC QL NAA+PROBE: NOT DETECTED
HPIV4 RNA SPEC QL NAA+PROBE: NOT DETECTED
IMM GRANULOCYTES # BLD AUTO: 0 K/UL (ref 0–0.04)
IMM GRANULOCYTES NFR BLD AUTO: 0 % (ref 0–0.5)
LACTATE SERPL-SCNC: 1.2 MMOL/L (ref 0.4–2)
LIPASE SERPL-CCNC: 91 U/L (ref 73–393)
LYMPHOCYTES # BLD: 1.3 K/UL (ref 0.8–3.5)
LYMPHOCYTES NFR BLD: 20 % (ref 12–49)
M PNEUMO DNA SPEC QL NAA+PROBE: NOT DETECTED
MCH RBC QN AUTO: 27 PG (ref 26–34)
MCHC RBC AUTO-ENTMCNC: 31.3 G/DL (ref 30–36.5)
MCV RBC AUTO: 86.2 FL (ref 80–99)
MONOCYTES # BLD: 0.6 K/UL (ref 0–1)
MONOCYTES NFR BLD: 10 % (ref 5–13)
NEUTS SEG # BLD: 4.3 K/UL (ref 1.8–8)
NEUTS SEG NFR BLD: 68 % (ref 32–75)
NRBC # BLD: 0 K/UL (ref 0–0.01)
NRBC BLD-RTO: 0 PER 100 WBC
P-R INTERVAL, ECG05: 222 MS
PLATELET # BLD AUTO: 191 K/UL (ref 150–400)
PMV BLD AUTO: 10.1 FL (ref 8.9–12.9)
POTASSIUM SERPL-SCNC: 3.7 MMOL/L (ref 3.5–5.1)
PROCALCITONIN SERPL-MCNC: 0.05 NG/ML
PROT SERPL-MCNC: 7.5 G/DL (ref 6.4–8.2)
Q-T INTERVAL, ECG07: 360 MS
QRS DURATION, ECG06: 84 MS
QTC CALCULATION (BEZET), ECG08: 459 MS
RBC # BLD AUTO: 4.71 M/UL (ref 3.8–5.2)
RSV RNA SPEC QL NAA+PROBE: NOT DETECTED
RV+EV RNA SPEC QL NAA+PROBE: NOT DETECTED
SARS-COV-2 PCR, COVPCR: NOT DETECTED
SARS-COV-2, COV2: NORMAL
SARS-COV-2, COV2: NOT DETECTED
SODIUM SERPL-SCNC: 142 MMOL/L (ref 136–145)
SPECIMEN SOURCE, FCOV2M: NORMAL
TROPONIN I SERPL-MCNC: <0.05 NG/ML
TROPONIN I SERPL-MCNC: <0.05 NG/ML
TSH SERPL DL<=0.05 MIU/L-ACNC: 2.68 UIU/ML (ref 0.36–3.74)
VENTRICULAR RATE, ECG03: 98 BPM
WBC # BLD AUTO: 6.3 K/UL (ref 3.6–11)

## 2021-02-21 PROCEDURE — 83690 ASSAY OF LIPASE: CPT

## 2021-02-21 PROCEDURE — 74011000258 HC RX REV CODE- 258: Performed by: INTERNAL MEDICINE

## 2021-02-21 PROCEDURE — 84145 PROCALCITONIN (PCT): CPT

## 2021-02-21 PROCEDURE — 36415 COLL VENOUS BLD VENIPUNCTURE: CPT

## 2021-02-21 PROCEDURE — 71250 CT THORAX DX C-: CPT

## 2021-02-21 PROCEDURE — 96372 THER/PROPH/DIAG INJ SC/IM: CPT

## 2021-02-21 PROCEDURE — 70450 CT HEAD/BRAIN W/O DYE: CPT

## 2021-02-21 PROCEDURE — 74011250636 HC RX REV CODE- 250/636: Performed by: INTERNAL MEDICINE

## 2021-02-21 PROCEDURE — U0005 INFEC AGEN DETEC AMPLI PROBE: HCPCS

## 2021-02-21 PROCEDURE — 83880 ASSAY OF NATRIURETIC PEPTIDE: CPT

## 2021-02-21 PROCEDURE — 96367 TX/PROPH/DG ADDL SEQ IV INF: CPT

## 2021-02-21 PROCEDURE — 96375 TX/PRO/DX INJ NEW DRUG ADDON: CPT

## 2021-02-21 PROCEDURE — 80053 COMPREHEN METABOLIC PANEL: CPT

## 2021-02-21 PROCEDURE — 85379 FIBRIN DEGRADATION QUANT: CPT

## 2021-02-21 PROCEDURE — 74011250637 HC RX REV CODE- 250/637: Performed by: INTERNAL MEDICINE

## 2021-02-21 PROCEDURE — 99218 HC RM OBSERVATION: CPT

## 2021-02-21 PROCEDURE — 85025 COMPLETE CBC W/AUTO DIFF WBC: CPT

## 2021-02-21 PROCEDURE — 96376 TX/PRO/DX INJ SAME DRUG ADON: CPT

## 2021-02-21 PROCEDURE — 0202U NFCT DS 22 TRGT SARS-COV-2: CPT

## 2021-02-21 PROCEDURE — 83605 ASSAY OF LACTIC ACID: CPT

## 2021-02-21 PROCEDURE — 84443 ASSAY THYROID STIM HORMONE: CPT

## 2021-02-21 PROCEDURE — 74011250636 HC RX REV CODE- 250/636: Performed by: NURSE PRACTITIONER

## 2021-02-21 PROCEDURE — 65660000000 HC RM CCU STEPDOWN

## 2021-02-21 PROCEDURE — 96366 THER/PROPH/DIAG IV INF ADDON: CPT

## 2021-02-21 PROCEDURE — 84484 ASSAY OF TROPONIN QUANT: CPT

## 2021-02-21 RX ORDER — POLYETHYLENE GLYCOL 3350 17 G/17G
17 POWDER, FOR SOLUTION ORAL DAILY PRN
Status: DISCONTINUED | OUTPATIENT
Start: 2021-02-21 | End: 2021-02-23 | Stop reason: HOSPADM

## 2021-02-21 RX ORDER — SODIUM CHLORIDE 0.9 % (FLUSH) 0.9 %
5-40 SYRINGE (ML) INJECTION EVERY 8 HOURS
Status: DISCONTINUED | OUTPATIENT
Start: 2021-02-21 | End: 2021-02-23 | Stop reason: HOSPADM

## 2021-02-21 RX ORDER — HEPARIN SODIUM 5000 [USP'U]/ML
5000 INJECTION, SOLUTION INTRAVENOUS; SUBCUTANEOUS EVERY 8 HOURS
Status: DISCONTINUED | OUTPATIENT
Start: 2021-02-21 | End: 2021-02-23 | Stop reason: HOSPADM

## 2021-02-21 RX ORDER — IPRATROPIUM BROMIDE AND ALBUTEROL SULFATE 2.5; .5 MG/3ML; MG/3ML
3 SOLUTION RESPIRATORY (INHALATION)
Status: DISCONTINUED | OUTPATIENT
Start: 2021-02-21 | End: 2021-02-21 | Stop reason: CLARIF

## 2021-02-21 RX ORDER — SODIUM CHLORIDE 0.9 % (FLUSH) 0.9 %
5-40 SYRINGE (ML) INJECTION AS NEEDED
Status: DISCONTINUED | OUTPATIENT
Start: 2021-02-21 | End: 2021-02-23 | Stop reason: HOSPADM

## 2021-02-21 RX ORDER — ACETAMINOPHEN 650 MG/1
650 SUPPOSITORY RECTAL
Status: DISCONTINUED | OUTPATIENT
Start: 2021-02-21 | End: 2021-02-23 | Stop reason: HOSPADM

## 2021-02-21 RX ORDER — PANTOPRAZOLE SODIUM 40 MG/1
40 TABLET, DELAYED RELEASE ORAL DAILY
Status: DISCONTINUED | OUTPATIENT
Start: 2021-02-21 | End: 2021-02-23 | Stop reason: HOSPADM

## 2021-02-21 RX ORDER — CLONAZEPAM 0.5 MG/1
0.25 TABLET ORAL 2 TIMES DAILY
Status: DISCONTINUED | OUTPATIENT
Start: 2021-02-21 | End: 2021-02-23 | Stop reason: HOSPADM

## 2021-02-21 RX ORDER — SODIUM CHLORIDE, SODIUM LACTATE, POTASSIUM CHLORIDE, CALCIUM CHLORIDE 600; 310; 30; 20 MG/100ML; MG/100ML; MG/100ML; MG/100ML
75 INJECTION, SOLUTION INTRAVENOUS CONTINUOUS
Status: DISCONTINUED | OUTPATIENT
Start: 2021-02-21 | End: 2021-02-23 | Stop reason: HOSPADM

## 2021-02-21 RX ORDER — HYDRALAZINE HYDROCHLORIDE 20 MG/ML
10 INJECTION INTRAMUSCULAR; INTRAVENOUS ONCE
Status: COMPLETED | OUTPATIENT
Start: 2021-02-21 | End: 2021-02-21

## 2021-02-21 RX ORDER — ACETAMINOPHEN 325 MG/1
650 TABLET ORAL
Status: DISCONTINUED | OUTPATIENT
Start: 2021-02-21 | End: 2021-02-23 | Stop reason: HOSPADM

## 2021-02-21 RX ORDER — ONDANSETRON 2 MG/ML
4 INJECTION INTRAMUSCULAR; INTRAVENOUS
Status: DISCONTINUED | OUTPATIENT
Start: 2021-02-21 | End: 2021-02-23 | Stop reason: HOSPADM

## 2021-02-21 RX ORDER — ASPIRIN 81 MG/1
81 TABLET ORAL DAILY
Status: DISCONTINUED | OUTPATIENT
Start: 2021-02-21 | End: 2021-02-23 | Stop reason: HOSPADM

## 2021-02-21 RX ORDER — ALBUTEROL SULFATE 90 UG/1
1 AEROSOL, METERED RESPIRATORY (INHALATION)
Status: DISCONTINUED | OUTPATIENT
Start: 2021-02-21 | End: 2021-02-23 | Stop reason: HOSPADM

## 2021-02-21 RX ORDER — MEMANTINE HYDROCHLORIDE 10 MG/1
10 TABLET ORAL 2 TIMES DAILY
Status: DISCONTINUED | OUTPATIENT
Start: 2021-02-21 | End: 2021-02-23 | Stop reason: HOSPADM

## 2021-02-21 RX ORDER — PROMETHAZINE HYDROCHLORIDE 25 MG/1
12.5 TABLET ORAL
Status: DISCONTINUED | OUTPATIENT
Start: 2021-02-21 | End: 2021-02-23 | Stop reason: HOSPADM

## 2021-02-21 RX ADMIN — HEPARIN SODIUM 5000 UNITS: 5000 INJECTION INTRAVENOUS; SUBCUTANEOUS at 07:07

## 2021-02-21 RX ADMIN — HYDRALAZINE HYDROCHLORIDE 10 MG: 20 INJECTION INTRAMUSCULAR; INTRAVENOUS at 07:06

## 2021-02-21 RX ADMIN — HEPARIN SODIUM 5000 UNITS: 5000 INJECTION INTRAVENOUS; SUBCUTANEOUS at 22:52

## 2021-02-21 RX ADMIN — Medication 10 ML: at 01:48

## 2021-02-21 RX ADMIN — Medication 1 CAPSULE: at 08:40

## 2021-02-21 RX ADMIN — CLONAZEPAM 0.25 MG: 0.5 TABLET ORAL at 08:40

## 2021-02-21 RX ADMIN — Medication 10 ML: at 15:20

## 2021-02-21 RX ADMIN — ASPIRIN 81 MG: 81 TABLET, COATED ORAL at 08:40

## 2021-02-21 RX ADMIN — MEMANTINE HYDROCHLORIDE 10 MG: 10 TABLET ORAL at 08:40

## 2021-02-21 RX ADMIN — MEMANTINE HYDROCHLORIDE 10 MG: 10 TABLET ORAL at 18:51

## 2021-02-21 RX ADMIN — Medication 10 ML: at 07:07

## 2021-02-21 RX ADMIN — ONDANSETRON 4 MG: 2 INJECTION INTRAMUSCULAR; INTRAVENOUS at 06:42

## 2021-02-21 RX ADMIN — SODIUM CHLORIDE, POTASSIUM CHLORIDE, SODIUM LACTATE AND CALCIUM CHLORIDE 75 ML/HR: 600; 310; 30; 20 INJECTION, SOLUTION INTRAVENOUS at 03:21

## 2021-02-21 RX ADMIN — CEFEPIME HYDROCHLORIDE 2 G: 2 INJECTION, POWDER, FOR SOLUTION INTRAVENOUS at 01:47

## 2021-02-21 RX ADMIN — PANTOPRAZOLE SODIUM 40 MG: 40 TABLET, DELAYED RELEASE ORAL at 08:40

## 2021-02-21 RX ADMIN — HEPARIN SODIUM 5000 UNITS: 5000 INJECTION INTRAVENOUS; SUBCUTANEOUS at 15:17

## 2021-02-21 RX ADMIN — Medication 10 ML: at 22:52

## 2021-02-21 RX ADMIN — CEFEPIME 2 G: 2 INJECTION, POWDER, FOR SOLUTION INTRAVENOUS at 15:18

## 2021-02-21 RX ADMIN — CLONAZEPAM 0.25 MG: 0.5 TABLET ORAL at 18:51

## 2021-02-21 NOTE — PROGRESS NOTES
Pt arrived to floor via stretcher. Pt alert but intermittently follows commands. Unable to obtain admission information because pt unable to answer questions. Pt is verbal but not appropriately to asked questions. Dual skin assessment completed with Brandie Hills. Pt gagging and was given zofran. Pt also observed to be hypertensive 195/99. Gerri Lyons paged and orders placed for hydralazine. Hydralazine given. BP improved.

## 2021-02-21 NOTE — PROGRESS NOTES
Day #1 of Cefepime  Indication:  Sepsis  Current regimen:  2 grams q12h  Abx regimen: Vancomycin and Cefepime  Recent Labs     21   WBC 7.3   CREA 1.28*   BUN 16     Est CrCl: 28 ml/min  Temp (24hrs), Av.2 °F (37.9 °C), Min:99 °F (37.2 °C), Max:101.8 °F (38.8 °C)    Cultures: blood and urine     Plan: Change to 2 grams q24h

## 2021-02-21 NOTE — PROGRESS NOTES
Renal Monitoring of Abx  Indication:  ?sepsis / fever of unknown origin  Current regimen:  cefepime 2g q24h    Recent Labs     21  0325 21   WBC 6.3 7.3   CREA 1.09* 1.28*   BUN 14 16     Temp (24hrs), Av.2 °F (37.3 °C), Min:97.6 °F (36.4 °C), Max:101.8 °F (38.8 °C)    Est CrCl: 30-35 ml/min     Plan: Change to cefepime 2g q12h given improvement in estimated renal function.

## 2021-02-21 NOTE — CONSULTS
Brief Neurology Correspondence: On brief review of chart, this consult was generated based on CT read of stable PICA territory infarct. On review of non-contrast head CT this admission and that from July 2020, the appearance of the posterior fossa parenchyma is stable. If there is concern for a chronic issue, patient can be referred for outpatient follow-up. Inpatient consultation is not appropriate for an issue extending at least 8 months back, at least in this setting.

## 2021-02-21 NOTE — PROGRESS NOTES
Hospitalist Progress Note  Jelena Pickens MD  Answering service: [phone] OR [phone] from in house phone        Date of Service:  2021  NAME:  Bartolo Ulloa  :  10/21/1930  MRN:  [id number]      Admission Summary:   As per initial admission summary  This is a 80-year-old woman with a past medical history significant for oxygen-dependent COPD, obstructive sleep apnea, dyslipidemia, Alzheimer's dementia, was in her usual state of health until the day of her presentation at the emergency room when it was reported that the patient developed weakness and cough. The weakness was described as generalized weakness. The cough is productive of yellowish sputum. About a week ago, the patient had multiple episodes of vomiting. She completed the second dose of COVID-19 vaccination yesterday. The patient most likely received the Logan Peter vaccine. Today, the patient developed a fever at the assisted living facility where the patient resides. Because of that, she was sent to the emergency room. The patient has Alzheimer's dementia and unable to provide good history. When the patient arrived at the emergency room, the initial temperature was 99.7, this went up to 101.8 and heart rate of 111. The patient was also found to have elevated lactic acid level. Because of that, code sepsis was called in the emergency room. The patient received broad-spectrum antibiotics and the code sepsis protocol was initiated and there was no clear source of infection, she was subsequently referred to the hospitalist service for evaluation for admission. The patient was last admitted to this hospital from 10/01/2020 to 10/05/2020. The patient was admitted with GI bleed for which the patient received evaluation and treatment. During that hospitalization, the patient was also found to have elevated troponin level. Echocardiogram was performed.   No intervention was carried out.  Outpatient followup by the nephrologist was advised. Interval history / Subjective:     Patient seen for Follow up of sepsis     Patient seen and examined by the bedside, Labs, images and notes reviewed  Patient with no complaints. admitted with possible sepsis. Assessment & Plan:     1. Sepsis. ?  LA 2.1 ON PRESENTATION    on vancomycin and cefepime. Blood culture pending . CT chest did not show anything acute. .  pro calcitonin is less than 0.05     Left PICA:Acute/subacute vs chronic? Ct head showed left PICA  Will consult neurology. 2.  Chronic respiratory failure with hypoxia. The patient wears 2 liters of oxygen at home, especially at bedtime. We will continue with supplemental oxygen. This is most likely due to COPD.    troponin negative bnp 618  currnelty her oxygenation is at baseline     3. COPD. The patient is not in acute exacerbation. We will continue with supplemental oxygen and DuoNeb as needed. 4.  Obstructive sleep apnea. The patient does not wear CPAP. We will continue with supplemental oxygen. 5.  Dyslipidemia. We will continue with dietary therapy. 6.  Alzheimer's dementia. We will continue with Namenda and supportive treatment. 7.  Acute kidney injury. This is most likely due to volume depletion. We will carry out fluid therapy and monitor the patient's renal function closely. 8.  Generalized weakness.   This is most likely due to deconditioning, but we will check a CT scan of the head to evaluate the patient for acute pathology, especially in this patient who has had a stroke in the past.    Code status: DNR   DVT prophylaxis: heparin SC     Care Plan discussed with: Patient/Family  Disposition: TBD     Hospital Problems  Date Reviewed: 2/21/2021          Codes Class Noted POA    * (Principal) Sepsis ([facility] ICD-10-CM: A41.9  ICD-9-CM: 038.9, 995.91  2/20/2021 Yes                Review of Systems:   A comprehensive review of systems was negative except for that written in the HPI. Vital Signs:    Last 24hrs VS reviewed since prior progress note. Most recent are:  Visit Vitals  /63 (BP 1 Location: Right upper arm, BP Patient Position: At rest)   Pulse 79   Temp 97.9 °F (36.6 °C)   Resp 13   Ht 5' 2\" (1.575 m)   Wt 74.3 kg (163 lb 12.8 oz)   SpO2 90%   BMI 29.96 kg/m²         Intake/Output Summary (Last 24 hours) at 2/21/2021 1345  Last data filed at 2/21/2021 0725  Gross per 24 hour   Intake 405 ml   Output    Net 405 ml        Physical Examination:   I had a face to face encounter with this patient and independently examined them on February 21, 2021 as outlined below:        Constitutional:  No acute distress, cooperative, pleasant    ENT:  Oral mucous moist, oropharynx benign. Neck supple,    Resp:  CTA bilaterally. No wheezing/rhonchi/rales. No accessory muscle use   CV:  Regular rhythm, normal rate, no murmurs, gallops, rubs    GI:  Soft, non distended, non tender. normoactive bowel sounds, no hepatosplenomegaly     Musculoskeletal:  No edema, warm, 2+ pulses throughout    Neurologic:  Moves all extremities. AAOx3, CN II-XII reviewed     . Data Review:    Review and/or order of clinical lab test  Review and/or order of tests in the radiology section of CPT  Review and/or order of tests in the medicine section of CPT      Labs:     Recent Labs     02/21/21  0325 02/20/21 2033   WBC 6.3 7.3   HGB 12.7 13.6   HCT 40.6 42.2    221     Recent Labs     02/21/21 0325 02/20/21 2033    139   K 3.7 3.8    103   CO2 28 30   BUN 14 16   CREA 1.09* 1.28*   GLU 99 119*   CA 8.6 9.3   MG  --  1.7   PHOS  --  3.0     Recent Labs     02/21/21 0325 02/20/21 2033   ALT 9* 12   AP 92 114   TBILI 0.8 0.7   TP 7.5 8.5*   ALB 3.0* 3.4*   GLOB 4.5* 5.1*   LPSE 91  --      No results for input(s): INR, PTP, APTT, INREXT in the last 72 hours. No results for input(s): FE, TIBC, PSAT, FERR in the last 72 hours.    No results found for: FOL, RBCF   No results for input(s): PH, PCO2, PO2 in the last 72 hours.   Recent Labs     02/21/21  1030 02/21/21  0325 02/20/21 2033   TROIQ <0.05 <0.05 <0.05     Lab Results   Component Value Date/Time    Cholesterol, total 223 (H) 01/21/2016 04:38 PM    HDL Cholesterol 78 01/21/2016 04:38 PM    LDL,Direct 107 (H) 05/26/2020 01:33 PM    LDL, calculated 131 (H) 01/21/2016 04:38 PM    Triglyceride 70 01/21/2016 04:38 PM    CHOL/HDL Ratio 2.8 06/17/2011 04:30 AM     No results found for: [facility]  Lab Results   Component Value Date/Time    Color YELLOW/STRAW 02/20/2021 11:08 PM    Appearance CLEAR 02/20/2021 11:08 PM    Specific gravity 1.019 02/20/2021 11:08 PM    pH (UA) 7.0 02/20/2021 11:08 PM    Protein Negative 02/20/2021 11:08 PM    Glucose Negative 02/20/2021 11:08 PM    Ketone Negative 02/20/2021 11:08 PM    Bilirubin Negative 02/20/2021 11:08 PM    Urobilinogen 1.0 02/20/2021 11:08 PM    Nitrites Negative 02/20/2021 11:08 PM    Leukocyte Esterase Negative 02/20/2021 11:08 PM    Epithelial cells FEW 02/20/2021 11:08 PM    Bacteria Negative 02/20/2021 11:08 PM    WBC 0-4 02/20/2021 11:08 PM    RBC 0-5 02/20/2021 11:08 PM         Medications Reviewed:     Current Facility-Administered Medications   Medication Dose Route Frequency    clonazePAM (KlonoPIN) tablet 0.25 mg  0.25 mg Oral BID    memantine (NAMENDA) tablet 10 mg  10 mg Oral BID    pantoprazole (PROTONIX) tablet 40 mg  40 mg Oral DAILY    aspirin delayed-release tablet 81 mg  81 mg Oral DAILY    sodium chloride (NS) flush 5-40 mL  5-40 mL IntraVENous Q8H    sodium chloride (NS) flush 5-40 mL  5-40 mL IntraVENous PRN    acetaminophen (TYLENOL) tablet 650 mg  650 mg Oral Q6H PRN    Or    acetaminophen (TYLENOL) suppository 650 mg  650 mg Rectal Q6H PRN    polyethylene glycol (MIRALAX) packet 17 g  17 g Oral DAILY PRN    promethazine (PHENERGAN) tablet 12.5 mg  12.5 mg Oral Q6H PRN    Or    ondansetron (ZOFRAN) injection 4 mg 4 mg IntraVENous Q6H PRN    L.acidophilus-paracasei-S.thermophil-bifidobacter (RISAQUAD) 8 billion cell capsule  1 Cap Oral DAILY    Vancomycin Pharmacy Dosing   Other Rx Dosing/Monitoring    lactated Ringers infusion  75 mL/hr IntraVENous CONTINUOUS    heparin (porcine) injection 5,000 Units  5,000 Units SubCUTAneous Q8H    vancomycin (VANCOCIN) 750 mg in 0.9% sodium chloride 250 mL (VIAL-MATE)  750 mg IntraVENous Q24H    albuterol (PROVENTIL HFA, VENTOLIN HFA, PROAIR HFA) inhaler 1 Puff  1 Puff Inhalation Q4H PRN    ipratropium (ATROVENT HFA) 17 mcg inhaler  1 Puff Inhalation Q4H PRN    cefepime (MAXIPIME) 2 g in 0.9% sodium chloride (MBP/ADV) 100 mL MBP  2 g IntraVENous Q12H    sodium chloride (NS) flush 5-10 mL  5-10 mL IntraVENous PRN     ______________________________________________________________________  EXPECTED LENGTH OF STAY: - - -  ACTUAL LENGTH OF STAY:          1                 Winnie Lima MD

## 2021-02-21 NOTE — PROGRESS NOTES
Pharmacist Note - Vancomycin Dosing    Consult provided for this 80 y.o. female for indication of sepsis, covid r/o. Antibiotic regimen(s): Cefepime and Vancomycin  Patient on vancomycin PTA? NO     Recent Labs     21  0325 21   WBC 6.3 7.3   CREA  --  1.28*   BUN  --  16     Frequency of BMP: daily  Height: 157.5 cm  Weight: 77 kg  Est CrCl: 25 ml/min  Temp (24hrs), Av.5 °F (37.5 °C), Min:97.6 °F (36.4 °C), Max:101.8 °F (38.8 °C)    Cultures:  Blood, urine    Goal trough = 15 - 20 mcg/mL    Therapy will be initiated with a loading dose of 1500 mg IV x 1 to be followed by a maintenance dose of 750 mg IV every 24 hours. Pharmacy to follow patient daily and order levels / make dose adjustments as appropriate.

## 2021-02-21 NOTE — H&P
[address]  HISTORY AND PHYSICAL    Name:  Katlyn Love  MR#:  [id number]  :  10/21/1930  ACCOUNT #:  [de-identified]  ADMIT DATE:  2021      The patient was seen, evaluated and admitted by me on 2021. PRIMARY CARE PHYSICIAN:  Rosalie Soto MD    SOURCE OF INFORMATION:  The patient who is not a good historian, the patient's daughter over the phone and review of ED and old electronic medical records. CHIEF COMPLAINT:  Cough and weakness. HISTORY OF PRESENT ILLNESS:  This is a 63-year-old woman with a past medical history significant for oxygen-dependent COPD, obstructive sleep apnea, dyslipidemia, Alzheimer's dementia, was in her usual state of health until the day of her presentation at the emergency room when it was reported that the patient developed weakness and cough. The weakness was described as generalized weakness. The cough is productive of yellowish sputum. About a week ago, the patient had multiple episodes of vomiting. She completed the second dose of COVID-19 vaccination yesterday. The patient most likely received the Logan Peter vaccine. Today, the patient developed a fever at the assisted living facility where the patient resides. Because of that, she was sent to the emergency room. The patient has Alzheimer's dementia and unable to provide good history. When the patient arrived at the emergency room, the initial temperature was 99.7, this went up to 101.8 and heart rate of 111. The patient was also found to have elevated lactic acid level. Because of that, code sepsis was called in the emergency room. The patient received broad-spectrum antibiotics and the code sepsis protocol was initiated and there was no clear source of infection, she was subsequently referred to the hospitalist service for evaluation for admission. The patient was last admitted to this hospital from 10/01/2020 to 10/05/2020.   The patient was admitted with GI bleed for which the patient received evaluation and treatment. During that hospitalization, the patient was also found to have elevated troponin level. Echocardiogram was performed. No intervention was carried out. Outpatient followup by the nephrologist was advised. PAST MEDICAL HISTORY:  Oxygen-dependent COPD, obstructive sleep apnea, dyslipidemia, Alzheimer's dementia, status post CVA. ALLERGIES:  THE PATIENT IS ALLERGIC TO KEFLEX. MEDICATIONS:  Tylenol 500 mg twice daily as needed for pain, aspirin 81 mg daily, Optivar 0.05% ophthalmic solution 1 drop into the right eye twice daily as needed, Klonopin 0.5 mg twice daily, Breo inhalation daily, Namenda 10 mg twice daily, Protonix 40 mg daily. FAMILY HISTORY:  This was reviewed. Her daughter has multiple sclerosis. PAST SURGICAL HISTORY:  This is significant for  section, abdominal hernia repair, left total knee replacement. SOCIAL HISTORY:  The patient is a former smoker, quit tobacco abuse in 1982. No history of alcohol abuse. REVIEW OF SYSTEMS:  Unable to obtain because of the patient's dementia. PHYSICAL EXAMINATION:  GENERAL APPEARANCE:  The patient appeared ill, in moderate distress. VITAL SIGNS:  On arrival at the emergency room, temperature 99.7, this went up to 101.8; pulse 111; respiratory rate 22; blood pressure 136/83; oxygen saturation 93%. HEENT:  Head:  Head normocephalic, atraumatic. Eyes:  Unable to assess eye movement, but no redness, no drainage, no discharge. Ears:  Normal external ears with no evidence of drainage. Nose:  No deformity and no drainage. Mouth and Throat:  No visible oral lesion. Dry oral mucosa. NECK:  Neck is supple. No JVD, no thyromegaly. CHEST:  Few expiratory wheezing. No crackles. HEART:  Normal S1 and S2, regular. No clinically appreciable murmur. ABDOMEN:  Soft, nontender. Normal bowel sounds. CNS:  Alert, oriented to self. No gross focal neurological deficit.   EXTREMITIES:  No edema.  Pulses 2+ bilaterally. MUSCULOSKELETAL SYSTEM:  No evidence of joint deformity or swelling. SKIN:  No active skin lesions seen in the exposed part of the body. PSYCHIATRY:  Unable to assess mood and affect. LYMPHATIC SYSTEM:  No cervical lymphadenopathy. DIAGNOSTIC DATA:  EKG shows sinus rhythm and nonspecific ST and T-wave abnormalities. Chest x-ray, no acute process. LABORATORY DATA:  Hematology:  WBC 7.3, hemoglobin 13.6, hematocrit 42.2, platelets 924. Chemistry:  Sodium 139, potassium 3.8, chloride 103, CO2 30, glucose 119, BUN 16, creatinine 1.28, calcium 9.3, total bilirubin 0.7, ALT 12, AST 14, alkaline phosphatase 114, total protein 8.5, albumin level 3.4, globulin 5.1. Cardiac profile:  Troponin less than 0.05. Magnesium 1.7, phosphorus 3.0. Lactic acid level 2.1. Urinalysis: This is significant for negative nitrite, negative leukocyte esterase, negative bacteria. ASSESSMENT:  1.  Sepsis. 2.  Chronic respiratory failure with hypoxia. 3.  Chronic obstructive pulmonary disease. 4.  Obstructive sleep apnea. 5.  Dyslipidemia. 6.  Alzheimer's dementia. 7.  Acute kidney injury. 8.  Generalized weakness. PLAN:  1. Sepsis. We will admit the patient for further evaluation and treatment. We will start the patient on vancomycin and cefepime. We will await blood culture. We will check lipase level. The patient may require CT scan of the chest to evaluate the patient for pneumonia and because the patient also has shortness of breath, the CT scan is better performed as a CTA to evaluate the patient for pneumonia and pulmonary embolism. This can be performed once the patient's acute kidney injury has resolved. We will check procalcitonin level and if this is not significantly elevated and the blood culture did not grow any organism in the next 24-48 hours, antibiotic therapy can be de-escalated. 2.  Chronic respiratory failure with hypoxia.   The patient wears 2 liters of oxygen at home, especially at bedtime. We will continue with supplemental oxygen. This is most likely due to COPD. We will check serial cardiac markers to rule out acute myocardial infarction. We will also check BNP level. 3.  COPD. The patient is not in acute exacerbation. We will continue with supplemental oxygen and DuoNeb as needed. We will check D-dimer to evaluate the patient for pulmonary embolism. 4.  Obstructive sleep apnea. The patient does not wear CPAP. We will continue with supplemental oxygen. 5.  Dyslipidemia. We will continue with dietary therapy. 6.  Alzheimer's dementia. We will continue with Namenda and supportive treatment. 7.  Acute kidney injury. This is most likely due to volume depletion. We will carry out fluid therapy and monitor the patient's renal function closely. 8.  Generalized weakness. This is most likely due to deconditioning, but we will check a CT scan of the head to evaluate the patient for acute pathology, especially in this patient who has had a stroke in the past.    OTHER ISSUES:  Code status: The patient is a DNR, do not resuscitate. We will place the patient on heparin for DVT prophylaxis. SEPSIS REASSESSMENT COMPLETED:  The patient has normal capillary refill, improved cardiopulmonary examination and moist mucous membrane. FUNCTIONAL STATUS PRIOR TO ADMISSION:  The patient came from assisted living facility. The patient is ambulatory with a rollator. COVID PRECAUTION:  The patient was wearing a face mask. I was wearing a face mask and gloves for this patient's encounter. Because the patient is coming from assisted living facility, the patient will be tested for COVID-19 virus infection.         Kaity Sanchez MD      [id number]  D:  02/21/2021 4:14  T:  02/21/2021 6:30  JOB #:  [id number]  CC:  Mary Johnston MD

## 2021-02-21 NOTE — ACP (ADVANCE CARE PLANNING)
[address] [facility]  [facility]                                      Advance Care Planning Note    Name: Jocelyn Barbosa  YOB: 1930  MRN: [id number]  Admission Date: 2/20/2021  8:15 PM    Date of discussion: 2/20/2021    Active Diagnoses:  Sepsis  Chronic respiratory failure with hypoxia  COPD  HECTOR  Dyslipidemia  Alzheimer's dementia  Acute kidney injury  Generalized weakness        These active diagnoses are of sufficient risk that focused discussion on advance care planning is indicated in order to allow the patient to thoughtfully consider personal goals of care, and if situations arise that prevent the ability to personally give input, to ensure appropriate representation of their personal desires for different levels and aggressiveness of care. Discussion: This advance care planning, ACP was carried out over the phone with the patient's daughter, Mady Mckeon, who is also the power of  for the patient. The discussion focused on the patient's goal of treatment. The suspected diagnosis of sepsis was discussed as the reason for admission. No source of infection has been identified yet and treatment with empiric antibiotics was discussed with the daughter. The patient presently resides at assisted living facility. The patient's functional status was discussed. The patient is presently ambulatory with a rollator. She is a do not resuscitate according to the patient's daughter. The daughter also stated that no artificial nutrition such as tube feeding, no dialysis and no ventilator management if indicated. She presently has oxygen at the assisted living facility. If further durable medical equipment required, the daughter is open to that to be supplied to the assisted living facility. The daughter is expecting that the patient will receive evaluation and treatment for the sepsis and return back to her routine at the assisted living facility.   The purpose of the ACP discussion was to let the patient's relative know about these options that have just been discussed, so that they can make a decision as to what needs to be done for their loved one if any of these situations should arise. Persons present and participating in discussion: Roni Cabello MD, patient's daughter, Sveta Regalado who is also the POA for the patient    Topics Discussed:  Patient's medical condition and diagnosis: [ x ] yes [  ] no   Surrogate decision maker: [ x ] yes [  ] no   Patient's current physical function/cognitive function/frailty: [x  ] yes [  ] no   Code Status: [x  ] yes [  ] no   Artificial Nutrition / Dialysis / Non-Invasive Ventilation / Blood Transfusion: [x  ] yes [  ] no  Potential Resources for home (durable medical equipment, home nursing, home O2): [ x ] yes [  ] no    Overview of Discussion: Patient is DNR. She will receive evaluation and treatment for sepsis and return back to her routine at the assisted living facility. Time Spent:     Total time spent face-to-face in education and discussion: 16 minutes.      Claudia Couch MD  Date of Service:  2/20/2021  11:17 PM

## 2021-02-21 NOTE — PROGRESS NOTES
Day #1 of Vancomycin  Indication:  ?sepsis/ fever of unknown origin  -unidentified source to date  -CT chest (dry) with no focal pulm process seen  -CXR (-)ve acute process  -hx of COPD, o2 dependent  -no leukocytosis, mild neutrophilia on presentation now resolved  -procalcitonin 0.05 this morning  Current regimen:  vanc 750 mg q24h  Abx regimen:  vanc/cefepime  ID Following ?: NO  Concomitant nephrotoxic drugs (requires more frequent monitoring): None  Frequency of BMP?: daily    Recent Labs     21  0325 21   WBC 6.3 7.3   CREA 1.09* 1.28*   BUN 14 16     Est CrCl: 30-35 ml/min; UO: -- ml/kg/hr  Temp (24hrs), Av.2 °F (37.3 °C), Min:97.6 °F (36.4 °C), Max:101.8 °F (38.8 °C)    Cultures:    blood: NG x1 day; prelim   resp viral panel: (-)ve   additional SARS-CoV2 PCR: (-)ve    Goal trough = 15 - 20 mcg/mL    Recent trough history (date/time/level/dose/action taken):  N/a    Plan: Continue current regimen. Awaiting further infectious workup - currently (-)ve for bacterial infection including (-)ve procal, (-)ve imaging, (-)ve leukocytosis. Patient presented with fever. Per notes, plan to allow cultures time to grow 24-48h and then de-escalate. Patient with COPD.     [facility], [address] [facility]

## 2021-02-21 NOTE — ED NOTES
Pt arrives to triage via wheelchair with [facility] 9 from [facility] on [address]. EMS reports fatigue, generalized sickness and cough onset today. Pt A&O to self and place in triage. Recently got 2nd Covid injection.

## 2021-02-21 NOTE — ED PROVIDER NOTES
75-year-old female with no significant past medical history presents with a chief complaint of fatigue. The patient received her second Covid vaccine yesterday. Last week she had several episodes of vomiting. Today she felt very fatigued but denies any chest pain, shortness of breath, abdominal pain, GI or urinary symptoms. She has had reduced appetite.            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,TIA    Unspecified sleep apnea     NO CPAP       Past Surgical History:   Procedure Laterality Date    HX GYN      c section    HX HEENT      status post T&A    HX OOPHORECTOMY      HX ORTHOPAEDIC      status post left total knee replacement    [place] ABDOMEN SURGERY PROC UNLISTED      hernia repair         Family History:   Problem Relation Age of Onset    Hypertension Maternal Grandmother     Hypertension Maternal Grandfather     Hypertension Paternal Grandmother     Hypertension Paternal Grandfather     MS Daughter     No Known Problems Mother     No Known Problems Father        Social History     Socioeconomic History    Marital status:      Spouse name: Not on file    Number of children: Not on file    Years of education: Not on file    Highest education level: Not on file   Occupational History    Not on file   Social Needs    Financial resource strain: Not on file    Food insecurity     Worry: Not on file     Inability: Not on file    Transportation needs     Medical: Not on file     Non-medical: Not on file   Tobacco Use    Smoking status: Former Smoker     Packs/day: 1.00     Years: 40.00     Pack years: 40.00     Quit date: 1982     Years since quittin.7    Smokeless tobacco: Never Used   Substance and Sexual Activity    Alcohol use: No    Drug use: No    Sexual activity: Yes     Partners: Male     Comment: ,2 daughters. retired. Lifestyle    Physical activity     Days per week: Not on file     Minutes per session: Not on file    Stress: Not on file   Relationships    Social connections     Talks on phone: Not on file     Gets together: Not on file     Attends [employer] service: Not on file     Active member of club or organization: Not on file     Attends meetings of clubs or organizations: Not on file     Relationship status: Not on file    Intimate partner violence     Fear of current or ex partner: Not on file     Emotionally abused: Not on file     Physically abused: Not on file     Forced sexual activity: Not on file   Other Topics Concern    Not on file   Social History Narrative    Moved to [place], [place] 20 years ago for 10 years----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 HENT: Negative for rhinorrhea. Respiratory: Negative for shortness of breath. Cardiovascular: Negative for chest pain. Gastrointestinal: Negative for abdominal pain. Genitourinary: Negative for dysuria. Musculoskeletal: Negative for back pain. Skin: Negative for wound. Neurological: Negative for headaches. Psychiatric/Behavioral: Negative for confusion. Vitals:    02/20/21 1919   BP: 136/83   Pulse: (!) 111   Resp: 22   Temp: 99.7 °F (37.6 °C)   SpO2: 93%   Weight: 77.1 kg (170 lb)   Height: 5' 2\" (1.575 m)            Physical Exam  Vitals signs and nursing note reviewed. Constitutional:       General: She is not in acute distress. Appearance: Normal appearance. She is ill-appearing. She is not toxic-appearing or diaphoretic. HENT:      Head: Normocephalic and atraumatic. Eyes:      Extraocular Movements: Extraocular movements intact. Neck:      Musculoskeletal: Normal range of motion.    Cardiovascular:      Rate and Rhythm: Regular rhythm. Tachycardia present. Pulses: Normal pulses. Heart sounds: Normal heart sounds. No murmur. No friction rub. No gallop. Pulmonary:      Effort: Pulmonary effort is normal. No respiratory distress. Breath sounds: Normal breath sounds. No wheezing or rales. Abdominal:      General: Abdomen is flat. Bowel sounds are normal. There is no distension. Palpations: Abdomen is soft. Tenderness: There is no abdominal tenderness. There is no guarding. Musculoskeletal: Normal range of motion. Skin:     General: Skin is warm and dry. Neurological:      Mental Status: She is alert and oriented to person, place, and time. Psychiatric:         Mood and Affect: Mood normal.          MDM  Number of Diagnoses or Management Options  SANIYA (acute kidney injury) ([facility] [place] [facility]  Elevated lactic acid level  Sepsis, due to unspecified organism, unspecified whether acute organ dysfunction present [facility])  Diagnosis management comments: 51-year-old female presents with concern for infection. She did receive a Covid vaccine yesterday. Rectal temperature 101.8 with tachycardia. There is no source at this time and a cath urine is pending. She was treated with Tylenol and will be started on broad-spectrum antibiotics. Patient and her daughter are comfortable and agreeable with admission. EKG shows sinus rhythm at a rate of 98, first-degree AV block with a NE interval of 222, otherwise normal intervals, normal axis, no ischemic changes.           Perfect Serve Consult for Admission  10:32 PM    ED Room Number: JK47/57  Patient Name and age:  Inder Rashid 80 y.o.  female  Working Diagnosis: Sepsis, due to unspecified organism, unspecified whether acute organ dysfunction present ([facility] [place] [facility]  (primary encounter diagnosis)  Elevated lactic acid level  SANIYA (acute kidney injury) ([facility] [place] [facility]    COVID-19 Suspicion:  no  Sepsis present:  yes  Reassessment needed: no  Code Status:  Full Code  Readmission: no  Isolation Requirements:  no  Recommended Level of Care:  telemetry  Department:[facility] Adult ED - 21   Other: Cath UA pending       Amount and/or Complexity of Data Reviewed  Clinical lab tests: ordered and reviewed  Tests in the radiology section of CPT®: ordered and reviewed           Procedures

## 2021-02-22 ENCOUNTER — APPOINTMENT (OUTPATIENT)
Dept: NUCLEAR MEDICINE | Age: 86
End: 2021-02-22
Attending: INTERNAL MEDICINE
Payer: MEDICARE

## 2021-02-22 LAB
ANION GAP SERPL CALC-SCNC: 6 MMOL/L (ref 5–15)
BUN SERPL-MCNC: 13 MG/DL (ref 6–20)
BUN/CREAT SERPL: 13 (ref 12–20)
CALCIUM SERPL-MCNC: 8 MG/DL (ref 8.5–10.1)
CHLORIDE SERPL-SCNC: 110 MMOL/L (ref 97–108)
CO2 SERPL-SCNC: 26 MMOL/L (ref 21–32)
CREAT SERPL-MCNC: 0.97 MG/DL (ref 0.55–1.02)
GLUCOSE SERPL-MCNC: 77 MG/DL (ref 65–100)
POTASSIUM SERPL-SCNC: 3.6 MMOL/L (ref 3.5–5.1)
SODIUM SERPL-SCNC: 142 MMOL/L (ref 136–145)

## 2021-02-22 PROCEDURE — 97161 PT EVAL LOW COMPLEX 20 MIN: CPT

## 2021-02-22 PROCEDURE — 96372 THER/PROPH/DIAG INJ SC/IM: CPT

## 2021-02-22 PROCEDURE — 36415 COLL VENOUS BLD VENIPUNCTURE: CPT

## 2021-02-22 PROCEDURE — 96376 TX/PRO/DX INJ SAME DRUG ADON: CPT

## 2021-02-22 PROCEDURE — 74011250636 HC RX REV CODE- 250/636: Performed by: INTERNAL MEDICINE

## 2021-02-22 PROCEDURE — 74011250637 HC RX REV CODE- 250/637: Performed by: INTERNAL MEDICINE

## 2021-02-22 PROCEDURE — 78580 LUNG PERFUSION IMAGING: CPT

## 2021-02-22 PROCEDURE — 99218 HC RM OBSERVATION: CPT

## 2021-02-22 PROCEDURE — 80048 BASIC METABOLIC PNL TOTAL CA: CPT

## 2021-02-22 PROCEDURE — 74011000258 HC RX REV CODE- 258: Performed by: INTERNAL MEDICINE

## 2021-02-22 PROCEDURE — 97116 GAIT TRAINING THERAPY: CPT

## 2021-02-22 PROCEDURE — 65660000000 HC RM CCU STEPDOWN

## 2021-02-22 RX ADMIN — ASPIRIN 81 MG: 81 TABLET, COATED ORAL at 08:40

## 2021-02-22 RX ADMIN — CEFEPIME 2 G: 2 INJECTION, POWDER, FOR SOLUTION INTRAVENOUS at 02:39

## 2021-02-22 RX ADMIN — Medication 10 ML: at 22:39

## 2021-02-22 RX ADMIN — HEPARIN SODIUM 5000 UNITS: 5000 INJECTION INTRAVENOUS; SUBCUTANEOUS at 14:31

## 2021-02-22 RX ADMIN — Medication 10 ML: at 05:58

## 2021-02-22 RX ADMIN — CEFEPIME 2 G: 2 INJECTION, POWDER, FOR SOLUTION INTRAVENOUS at 14:31

## 2021-02-22 RX ADMIN — Medication 1 CAPSULE: at 08:40

## 2021-02-22 RX ADMIN — MEMANTINE HYDROCHLORIDE 10 MG: 10 TABLET ORAL at 17:29

## 2021-02-22 RX ADMIN — CLONAZEPAM 0.25 MG: 0.5 TABLET ORAL at 17:29

## 2021-02-22 RX ADMIN — SODIUM CHLORIDE, POTASSIUM CHLORIDE, SODIUM LACTATE AND CALCIUM CHLORIDE 75 ML/HR: 600; 310; 30; 20 INJECTION, SOLUTION INTRAVENOUS at 17:31

## 2021-02-22 RX ADMIN — VANCOMYCIN HYDROCHLORIDE 750 MG: 750 INJECTION, POWDER, LYOPHILIZED, FOR SOLUTION INTRAVENOUS at 22:39

## 2021-02-22 RX ADMIN — MEMANTINE HYDROCHLORIDE 10 MG: 10 TABLET ORAL at 08:40

## 2021-02-22 RX ADMIN — ACETAMINOPHEN 650 MG: 325 TABLET ORAL at 22:57

## 2021-02-22 RX ADMIN — CLONAZEPAM 0.25 MG: 0.5 TABLET ORAL at 08:40

## 2021-02-22 RX ADMIN — HEPARIN SODIUM 5000 UNITS: 5000 INJECTION INTRAVENOUS; SUBCUTANEOUS at 22:38

## 2021-02-22 RX ADMIN — SODIUM CHLORIDE, POTASSIUM CHLORIDE, SODIUM LACTATE AND CALCIUM CHLORIDE 75 ML/HR: 600; 310; 30; 20 INJECTION, SOLUTION INTRAVENOUS at 00:32

## 2021-02-22 RX ADMIN — HEPARIN SODIUM 5000 UNITS: 5000 INJECTION INTRAVENOUS; SUBCUTANEOUS at 05:57

## 2021-02-22 RX ADMIN — PANTOPRAZOLE SODIUM 40 MG: 40 TABLET, DELAYED RELEASE ORAL at 08:40

## 2021-02-22 NOTE — PROGRESS NOTES
Transition of Care: likely back to [facility] assisted living (where patient is a resident)    Transport Plan: in car with daughter Devaughn Montilla [phone]    RUR: 17%    Dx: sepsis    Discharge pending  -pending medical progress and care recommendations    1440: CM met with patient and daughter Devaughn Montilla at bedside; patient is confused and oriented to self; daughter states that patient does have some dementia but this confusion is new; daughter states that her parents both live together at [facility] [address] just moving there in July 2020 from independent living at [facility]; daughter states that her mother has a rollator and was moderate independent in her ADL; needing some assistance with dressing; but can walk to the bathroom with a rollator; the AL facility helps with ADLs, delivers meals and uses the [employer]" for PT/OT; they also handle all the patients medications and administration of them daughter would like for her mother to return to [facility] at discharge as they will handle everything; patients PCP is Dr. Rosa Dennis and last tel visit was in October 2020 and patients prescriptions are handled throught the pharmacy at [facility]; daughter states she will transport patient at discharge in car    Medicare pt (patients daughter- Devaughn Montilla)  has received, reviewed, and signed 1st IM letter informing them of their right to appeal the discharge. Signed copy has been placed on pt bedside chart.     Reason for Admission:   sepsis                   RUR Score:         17%            Plan for utilizing home health:  likely none     PCP: First and Last name:  Rosa Dennis MD   Name of Practice:    Are you a current patient: Yes/No: yes   Approximate date of last visit: October 2020   Can you participate in a virtual visit with your PCP: yes                    Current Advanced Directive/Advance Care Plan: DNR; has acp docs Transition of Care Plan:  Likely back to [facility]; transport in car with daughter    Care Management Interventions  PCP Verified by CM: Arabella Moise MD)  Last Visit to PCP: 10/22/20  Mode of Transport at Discharge: Other (see comment)(in car with daughter)  Transition of Care Consult (CM Consult):  Other(likely back to [address])  Physical Therapy Consult: Yes  Occupational Therapy Consult: Yes  Current Support Network: Assisted Living  Confirm Follow Up Transport: Family  Discharge Location  Discharge Placement: Other:(likely back to [facility] when medically stable)     CM following  Nancie Mejias RN, CRM

## 2021-02-22 NOTE — PROGRESS NOTES
Hospitalist Progress Note  Adam Chu MD  Answering service: [phone] or [phone] from in house phone        Date of Service:  2021  NAME:  Komal Villalba  :  10/21/1930  MRN:  [id number]      Admission Summary:   As per initial admission summary  This is a 89-year-old woman with a past medical history significant for oxygen-dependent COPD, obstructive sleep apnea, dyslipidemia, Alzheimer's dementia, was in her usual state of health until the day of her presentation at the emergency room when it was reported that the patient developed weakness and cough.  The weakness was described as generalized weakness.  The cough is productive of yellowish sputum.  About a week ago, the patient had multiple episodes of vomiting.  She completed the second dose of COVID-19 vaccination yesterday.  The patient most likely received the Pfizer vaccine.  Today, the patient developed a fever at the assisted living facility where the patient resides.  Because of that, she was sent to the emergency room.  The patient has Alzheimer's dementia and unable to provide good history.  When the patient arrived at the emergency room, the initial temperature was 99.7, this went up to 101.8 and heart rate of 111.  The patient was also found to have elevated lactic acid level.  Because of that, code sepsis was called in the emergency room.  The patient received broad-spectrum antibiotics and the code sepsis protocol was initiated and there was no clear source of infection, she was subsequently referred to the hospitalist service for evaluation for admission.  The patient was last admitted to this hospital from 10/01/2020 to 10/05/2020.  The patient was admitted with GI bleed for which the patient received evaluation and treatment.  During that hospitalization, the patient was also found to have elevated troponin level.  Echocardiogram was performed.  No intervention was carried  out.  Outpatient followup by the nephrologist was advised. Interval history / Subjective:     Patient seen for Follow up of sepsis     Patient seen and examined by the bedside, Labs, images and notes reviewed  Patient appears to be confused today. I talked to daughter in detail. She is interested in taking patent back to [facility]. Discussed in detail regarding patient medical condition      Assessment & Plan:     1. Sepsis. ?  LA 2.1 ON PRESENTATION  which resolved    on vancomycin and cefepime. Blood culture pending . NGTD    CT chest did not show anything acute. .  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 Ct head showed left PICA  Neurology signed off. No acute changes     2. Chronic respiratory failure with hypoxia. The patient wears 2 liters of oxygen at home, especially at bedtime. We will continue with supplemental oxygen. This is most likely due to COPD.    troponin negative bnp 618  currnelty her oxygenation is at baseline     3. COPD. The patient is not in acute exacerbation. We will continue with supplemental oxygen and DuoNeb as needed. 4.  Obstructive sleep apnea. The patient does not wear CPAP. We will continue with supplemental oxygen. 5.  Dyslipidemia. We will continue with dietary therapy. 6.  Alzheimer's dementia. We will continue with Namenda and supportive treatment. 7.  Acute kidney injury. This is most likely due to volume depletion. We will carry out fluid therapy and monitor the patient's renal function closely. 8.  Generalized weakness.   This is most likely due to deconditioning, but we will check a CT scan of the head to evaluate the patient for acute pathology, especially in this patient who has had a stroke in the past.    Code status: DNR   DVT prophylaxis: heparin SC     Care Plan discussed with: Patient/Family  Disposition: TBD     Hospital Problems  Date Reviewed: 2/21/2021          Codes Class Noted POA    * (Principal) Sepsis ([facility] [address] [facility] ICD-10-CM: A41.9  ICD-9-CM: 038.9, 995.91  2/20/2021 Yes                Review of Systems:   A comprehensive review of systems was negative except for that written in the HPI. Vital Signs:    Last 24hrs VS reviewed since prior progress note. Most recent are:  Visit Vitals  /70 (BP Patient Position: At rest)   Pulse 86   Temp 98.2 °F (36.8 °C)   Resp 20   Ht 5' 2\" (1.575 m)   Wt 74.3 kg (163 lb 12.8 oz)   SpO2 93%   BMI 29.96 kg/m²         Intake/Output Summary (Last 24 hours) at 2/22/2021 1556  Last data filed at 2/22/2021 0032  Gross per 24 hour   Intake 1283.75 ml   Output 0 ml   Net 1283.75 ml        Physical Examination:   I had a face to face encounter with this patient and independently examined them on February 22, 2021 as outlined below:        Constitutional:  confused. ENT:  Oral mucous moist, oropharynx benign. Neck supple,    Resp:  CTA bilaterally. No wheezing/rhonchi/rales. No accessory muscle use   CV:  Regular rhythm, normal rate, no murmurs, gallops, rubs    GI:  Soft, non distended, non tender. normoactive bowel sounds, no hepatosplenomegaly     Musculoskeletal:  No edema, warm, 2+ pulses throughout    Neurologic:  Moves all extremities.         .       Data Review:    Review and/or order of clinical lab test  Review and/or order of tests in the radiology section of CPT  Review and/or order of tests in the medicine section of CPT      Labs:     Recent Labs     02/21/21  0325 02/20/21 2033   WBC 6.3 7.3   HGB 12.7 13.6   HCT 40.6 42.2    221     Recent Labs     02/22/21  0348 02/21/21 0325 02/20/21 2033    142 139   K 3.6 3.7 3.8   * 107 103   CO2 26 28 30   BUN 13 14 16   CREA 0.97 1.09* 1.28*   GLU 77 99 119*   CA 8.0* 8.6 9.3   MG  --   --  1.7   PHOS  --   --  3.0     Recent Labs     02/21/21  0325 02/20/21 2033   ALT 9* 12   AP 92 114   TBILI 0.8 0.7   TP 7.5 8.5*   ALB 3.0* 3.4*   GLOB 4.5* 5.1*   LPSE 91  -- No results for input(s): INR, PTP, APTT, INREXT, INREXT in the last 72 hours. No results for input(s): FE, TIBC, PSAT, FERR in the last 72 hours. No results found for: FOL, RBCF   No results for input(s): PH, PCO2, PO2 in the last 72 hours.   Recent Labs     02/21/21  1030 02/21/21  0325 02/20/21 2033   TROIQ <0.05 <0.05 <0.05     Lab Results   Component Value Date/Time    Cholesterol, total 223 (H) 01/21/2016 04:38 PM    HDL Cholesterol 78 01/21/2016 04:38 PM    LDL,Direct 107 (H) 05/26/2020 01:33 PM    LDL, calculated 131 (H) 01/21/2016 04:38 PM    Triglyceride 70 01/21/2016 04:38 PM    CHOL/HDL Ratio 2.8 06/17/2011 04:30 AM     No results found for: [facility]  Lab Results   Component Value Date/Time    Color YELLOW/STRAW 02/20/2021 11:08 PM    Appearance CLEAR 02/20/2021 11:08 PM    Specific gravity 1.019 02/20/2021 11:08 PM    pH (UA) 7.0 02/20/2021 11:08 PM    Protein Negative 02/20/2021 11:08 PM    Glucose Negative 02/20/2021 11:08 PM    Ketone Negative 02/20/2021 11:08 PM    Bilirubin Negative 02/20/2021 11:08 PM    Urobilinogen 1.0 02/20/2021 11:08 PM    Nitrites Negative 02/20/2021 11:08 PM    Leukocyte Esterase Negative 02/20/2021 11:08 PM    Epithelial cells FEW 02/20/2021 11:08 PM    Bacteria Negative 02/20/2021 11:08 PM    WBC 0-4 02/20/2021 11:08 PM    RBC 0-5 02/20/2021 11:08 PM         Medications Reviewed:     Current Facility-Administered Medications   Medication Dose Route Frequency    clonazePAM (KlonoPIN) tablet 0.25 mg  0.25 mg Oral BID    memantine (NAMENDA) tablet 10 mg  10 mg Oral BID    pantoprazole (PROTONIX) tablet 40 mg  40 mg Oral DAILY    aspirin delayed-release tablet 81 mg  81 mg Oral DAILY    sodium chloride (NS) flush 5-40 mL  5-40 mL IntraVENous Q8H    sodium chloride (NS) flush 5-40 mL  5-40 mL IntraVENous PRN    acetaminophen (TYLENOL) tablet 650 mg  650 mg Oral Q6H PRN    Or    acetaminophen (TYLENOL) suppository 650 mg  650 mg Rectal Q6H PRN    polyethylene glycol (MIRALAX) packet 17 g  17 g Oral DAILY PRN    promethazine (PHENERGAN) tablet 12.5 mg  12.5 mg Oral Q6H PRN    Or    ondansetron (ZOFRAN) injection 4 mg  4 mg IntraVENous Q6H PRN    L.acidophilus-paracasei-S.thermophil-bifidobacter (RISAQUAD) 8 billion cell capsule  1 Cap Oral DAILY    Vancomycin Pharmacy Dosing   Other Rx Dosing/Monitoring    lactated Ringers infusion  75 mL/hr IntraVENous CONTINUOUS    heparin (porcine) injection 5,000 Units  5,000 Units SubCUTAneous Q8H    vancomycin (VANCOCIN) 750 mg in 0.9% sodium chloride 250 mL (VIAL-MATE)  750 mg IntraVENous Q24H    albuterol (PROVENTIL HFA, VENTOLIN HFA, PROAIR HFA) inhaler 1 Puff  1 Puff Inhalation Q4H PRN    ipratropium (ATROVENT HFA) 17 mcg inhaler  1 Puff Inhalation Q4H PRN    cefepime (MAXIPIME) 2 g in 0.9% sodium chloride (MBP/ADV) 100 mL MBP  2 g IntraVENous Q12H    sodium chloride (NS) flush 5-10 mL  5-10 mL IntraVENous PRN     ______________________________________________________________________  EXPECTED LENGTH OF STAY: 3d 14h  ACTUAL LENGTH OF STAY:          2                 Julieth Escobedo MD

## 2021-02-22 NOTE — PROGRESS NOTES
Report given to [facility]. Pt transferred to . Nos/sx of pain or discomfort. Pt continues to be AOx1 but is more responsive and alert. Dariana Bruno ( daughter) called and notified of transfer.

## 2021-02-22 NOTE — PROGRESS NOTES
Bedside shift change report given to ARSH Heart (oncoming nurse) by ARSH Ivy (offgoing nurse). Report included the following information SBAR, Kardex, ED Summary, Recent Results and Cardiac Rhythm NSR.     Problem: Pressure Injury - Risk of  Goal: *Prevention of pressure injury  Description: Document Alejandro Scale and appropriate interventions in the flowsheet.  Outcome: Progressing Towards Goal  Note: Pressure Injury Interventions:  Sensory Interventions: Assess changes in LOC, Keep linens dry and wrinkle-free    Moisture Interventions: Absorbent underpads    Activity Interventions: Increase time out of bed    Mobility Interventions: HOB 30 degrees or less    Nutrition Interventions: Document food/fluid/supplement intake                     Problem: Falls - Risk of  Goal: *Absence of Falls  Description: Document Jordyn Fall Risk and appropriate interventions in the flowsheet.  Outcome: Progressing Towards Goal  Note: Fall Risk Interventions:       Mentation Interventions: Adequate sleep, hydration, pain control    Medication Interventions: Evaluate medications/consider consulting pharmacy    Elimination Interventions: Call light in reach

## 2021-02-22 NOTE — PROGRESS NOTES
Problem: Mobility Impaired (Adult and Pediatric)  Goal: *Acute Goals and Plan of Care (Insert Text)  Description: FUNCTIONAL STATUS PRIOR TO ADMISSION: unclear as patient is poor historian; from assisted living and ambulates with walker but unclear on amount of assist    [address] [place] [address]: assisted living    Physical Therapy Goals  Initiated 2/22/2021  1. Patient will move from supine to sit and sit to supine  and roll side to side in bed with supervision/set-up within 7 day(s). 2.  Patient will transfer from bed to chair and chair to bed with minimal assistance/contact guard assist using the least restrictive device within 7 day(s). 3.  Patient will perform sit to stand with minimal assistance/contact guard assist within 7 day(s). 4.  Patient will ambulate with supervision/set-up for 50 feet with the least restrictive device within 7 day(s). Outcome: Progressing Towards Goal   PHYSICAL THERAPY EVALUATION  Patient: Bartolo Ulloa (12 y.o. female)  Date: 2/22/2021  Primary Diagnosis: Sepsis ([facility] [address] [A41.9]        Precautions: fall         ASSESSMENT  Based on the objective data described below, the patient presents with impaired strength, bed mobility, transfers and gait. She is very confused oriented only to self. Agreeable to getting up and upon rising stated needed to use toilet. Assisted to toilet but incontinent of bladder. Was able to wipe self in standing with use of RW . Gait is unsteady and slow with some  noted kneed wobble. She is from an assisted living and likely can return pending the amount of assistance she can receive for transfers and gait. She may be her baseline. .    Current Level of Function Impacting Discharge (mobility/balance): mod to min assist    Functional Outcome Measure: The patient scored Total: 20/100 on the Barthel Index which is indicative of 80% impaired ability to care for basic self needs/dependency on others.      Other factors to consider for discharge:      Patient will benefit from skilled therapy intervention to address the above noted impairments. PLAN :  Recommendations and Planned Interventions: bed mobility training, transfer training, gait training, therapeutic exercises, patient and family training/education, and therapeutic activities      Frequency/Duration: Patient will be followed by physical therapy:  5 times a week to address goals. Recommendation for discharge: (in order for the patient to meet his/her long term goals)  To be determined: likely back to [facility] if they can provide the needed assistance/supervision. If unable may need to go to SNF rehab    This discharge recommendation:  Has not yet been discussed the attending provider and/or case management    IF patient discharges home will need the following DME: patient owns DME required for discharge         SUBJECTIVE:   Patient stated Is that my  out there.   \"I don't understand all this with immigration. I am not an immigrant! 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,TIA    Unspecified sleep apnea     NO CPAP     Past Surgical History:   Procedure Laterality Date    HX GYN  1963    c section    HX HEENT      status post T&A    HX OOPHORECTOMY      HX ORTHOPAEDIC      status post left total knee replacement    [place] ABDOMEN SURGERY PROC UNLISTED      hernia repair       Personal factors and/or comorbidities impacting plan of care:     Home Situation  Home Environment: Assisted living  One/Two Story Residence: One story  Living Alone: No  Support Systems: Child(ирина)  Patient Expects to be Discharged to[de-identified] Other (comment)(daughter states she wants mother to discharge home with her)  Current DME Used/Available at Home: janet Davila    EXAMINATION/PRESENTATION/DECISION MAKING:   Critical Behavior:  Neurologic State: Confused, Eyes open spontaneously  Orientation Level: Oriented to person  Cognition: Follows commands  Safety/Judgement: Not assessed  Hearing: Auditory  Auditory Impairment: None    Range Of Motion:  AROM: Generally decreased, functional                       Strength:    Strength: Generally decreased, functional                    Tone & Sensation:   Tone: Normal              Sensation: Intact               Coordination:  Coordination: Generally decreased, functional  Vision:      Functional Mobility:  Bed Mobility:     Supine to Sit: Minimum assistance; Additional time     Scooting: Minimum assistance  Transfers:  Sit to Stand: Moderate assistance  Stand to Sit: Minimum assistance                       Balance:   Sitting: Intact  Standing: Impaired; With support  Standing - Static: Constant support; Fair  Standing - Dynamic : Constant support; Fair  Ambulation/Gait Training:  Distance (ft): 12 Feet (ft)(x2)  Assistive Device: Gait belt;Walker, rolling  Ambulation - Level of Assistance: Minimal assistance     Gait Description (WDL): Exceptions to WDL  Gait Abnormalities: Decreased step clearance        Base of Support: Narrowed     Speed/Patti: Pace decreased (<100 feet/min)  Step Length: Right shortened;Left shortened         Functional Measure:  Barthel Index:    Bathin  Bladder: 0  Bowels: 0  Groomin  Dressin  Feeding: 10  Mobility: 0  Stairs: 0  Toilet Use: 5  Transfer (Bed to Chair and Back): 5  Total: 20/100       The Barthel ADL Index: Guidelines  1. The index should be used as a record of what a patient does, not as a record of what a patient could do. 2. The main aim is to establish degree of independence from any help, physical or verbal, however minor and for whatever reason. 3. The need for supervision renders the patient not independent.   4. A patient's performance should be established using the best available evidence. Asking the patient, friends/relatives and nurses are the usual sources, but direct observation and common sense are also important. However direct testing is not needed. 5. Usually the patient's performance over the preceding 24-48 hours is important, but occasionally longer periods will be relevant. 6. Middle categories imply that the patient supplies over 50 per cent of the effort. 7. Use of aids to be independent is allowed. Dejuan Mcclain., Barthel, D.W. ([id number]). Functional evaluation: the Barthel Index. [address] [facility] (14)2. PAUL Mcfarlane, Tony Lu., Tawanna Ormond., [place], [address] Measuring the change indisability after inpatient rehabilitation; comparison of the responsiveness of the Barthel Index and Functional [place] Measure. Journal of Neurology, Neurosurgery, and Psychiatry, 66(4), 643-303. Amna Alaniz, N.J.A, YAIMA Saleem, & Lindsay Bermudez MJUVENAL. (2004.) Assessment of post-stroke quality of life in cost-effectiveness studies: The usefulness of the Barthel Index and the EuroQoL-5D. Quality of Life Research, 15, 124-97        Physical Therapy Evaluation Charge Determination   History Examination Presentation Decision-Making   MEDIUM  Complexity : 1-2 comorbidities / personal factors will impact the outcome/ POC  LOW Complexity : 1-2 Standardized tests and measures addressing body structure, function, activity limitation and / or participation in recreation  LOW Complexity : Stable, uncomplicated        Based on the above components, the patient evaluation is determined to be of the following complexity level: LOW     Pain Ratin    Activity Tolerance:   Fair    After treatment patient left in no apparent distress:   Sitting in chair, Call bell within reach, and Bed / chair alarm activated    COMMUNICATION/EDUCATION:   The patients plan of care was discussed with: Registered nurse.      Patient is unable to participate in goal setting and plan of care.     Thank you for this referral.  Lisa Maciel, PT   Time Calculation: 31 mins

## 2021-02-23 VITALS
HEIGHT: 62 IN | DIASTOLIC BLOOD PRESSURE: 73 MMHG | RESPIRATION RATE: 21 BRPM | HEART RATE: 75 BPM | WEIGHT: 163.8 LBS | SYSTOLIC BLOOD PRESSURE: 166 MMHG | BODY MASS INDEX: 30.14 KG/M2 | TEMPERATURE: 97.3 F | OXYGEN SATURATION: 94 %

## 2021-02-23 LAB
ANION GAP SERPL CALC-SCNC: 4 MMOL/L (ref 5–15)
BUN SERPL-MCNC: 14 MG/DL (ref 6–20)
BUN/CREAT SERPL: 15 (ref 12–20)
CALCIUM SERPL-MCNC: 8.3 MG/DL (ref 8.5–10.1)
CHLORIDE SERPL-SCNC: 109 MMOL/L (ref 97–108)
CO2 SERPL-SCNC: 27 MMOL/L (ref 21–32)
CREAT SERPL-MCNC: 0.93 MG/DL (ref 0.55–1.02)
GLUCOSE SERPL-MCNC: 89 MG/DL (ref 65–100)
POTASSIUM SERPL-SCNC: 3.5 MMOL/L (ref 3.5–5.1)
SODIUM SERPL-SCNC: 140 MMOL/L (ref 136–145)

## 2021-02-23 PROCEDURE — 94760 N-INVAS EAR/PLS OXIMETRY 1: CPT

## 2021-02-23 PROCEDURE — 36415 COLL VENOUS BLD VENIPUNCTURE: CPT

## 2021-02-23 PROCEDURE — 97165 OT EVAL LOW COMPLEX 30 MIN: CPT

## 2021-02-23 PROCEDURE — 97116 GAIT TRAINING THERAPY: CPT

## 2021-02-23 PROCEDURE — 80048 BASIC METABOLIC PNL TOTAL CA: CPT

## 2021-02-23 PROCEDURE — 77010033678 HC OXYGEN DAILY

## 2021-02-23 PROCEDURE — 74011000258 HC RX REV CODE- 258: Performed by: INTERNAL MEDICINE

## 2021-02-23 PROCEDURE — 97535 SELF CARE MNGMENT TRAINING: CPT

## 2021-02-23 PROCEDURE — 94761 N-INVAS EAR/PLS OXIMETRY MLT: CPT

## 2021-02-23 PROCEDURE — 96376 TX/PRO/DX INJ SAME DRUG ADON: CPT

## 2021-02-23 PROCEDURE — 74011250637 HC RX REV CODE- 250/637: Performed by: INTERNAL MEDICINE

## 2021-02-23 PROCEDURE — 96372 THER/PROPH/DIAG INJ SC/IM: CPT

## 2021-02-23 PROCEDURE — 74011250636 HC RX REV CODE- 250/636: Performed by: INTERNAL MEDICINE

## 2021-02-23 PROCEDURE — 99218 HC RM OBSERVATION: CPT

## 2021-02-23 RX ADMIN — MEMANTINE HYDROCHLORIDE 10 MG: 10 TABLET ORAL at 09:27

## 2021-02-23 RX ADMIN — CEFEPIME 2 G: 2 INJECTION, POWDER, FOR SOLUTION INTRAVENOUS at 03:10

## 2021-02-23 RX ADMIN — HEPARIN SODIUM 5000 UNITS: 5000 INJECTION INTRAVENOUS; SUBCUTANEOUS at 05:52

## 2021-02-23 RX ADMIN — CLONAZEPAM 0.25 MG: 0.5 TABLET ORAL at 09:28

## 2021-02-23 RX ADMIN — Medication 1 CAPSULE: at 09:28

## 2021-02-23 RX ADMIN — ASPIRIN 81 MG: 81 TABLET, COATED ORAL at 09:28

## 2021-02-23 RX ADMIN — PANTOPRAZOLE SODIUM 40 MG: 40 TABLET, DELAYED RELEASE ORAL at 09:27

## 2021-02-23 NOTE — PROGRESS NOTES
Problem: Self Care Deficits Care Plan (Adult)  Goal: *Acute Goals and Plan of Care (Insert Text)  Description:   FUNCTIONAL STATUS PRIOR TO ADMISSION: Patient was largely supervision for basic ADLs. HOME SUPPORT: The patient lived with family but did not require significant assist.    Occupational Therapy Goals  Initiated 2/23/2021  1. Patient will perform grooming in standing with supervision/set-up within 7 day(s). 2.  Patient will perform bathing sitting EOB with minimal assistance/contact guard assist within 7 day(s). 3.  Patient will perform lower body dressing with minimal assistance/contact guard assist within 7 day(s). 4.  Patient will perform toilet transfers with minimal assistance/contact guard assist within 7 day(s). 5.  Patient will perform all aspects of toileting with minimal assistance/contact guard assist within 7 day(s). 6.  Patient will participate in upper extremity therapeutic exercise/activities with supervision/set-up for 5 minutes within 7 day(s). 7.  Patient will utilize energy conservation techniques during functional activities with verbal cues within 7 day(s). Outcome: Not Met    OCCUPATIONAL THERAPY EVALUATION  Patient: Lauren Terry (89 y.o. female)  Date: 2/23/2021  Primary Diagnosis: Sepsis [facility]) [A41.9]        Precautions:  Fall    ASSESSMENT  Based on the objective data described below, the patient presents with limited ADL performance s/p admission for sepsis. Patient ADLs limited by impaired balance, generalized weakness, decreased functional activity tolerance, limited lower body access, impaired cardiopulmonary endurance and impaired cognition (baseline Alzheimer's - impaired STM, safety awareness, orientation [A+O x1-2]). At baseline, patient lives with her  in retirement and has a daughter who is involved in her care. Tody, patient received in bed and agreeable to therapy.  Patient with min A to come to sitting EOB and up to mod A using RW to transfer to chair. Patient required assist to manage RW and for safety with mobility. Patient required max A to change soiled brief and gown. Patient left sitting in chair at end of session with call bell in reach, RN aware, alarm active and all needs met. Will continue to follow. Recommend return to [facility] with HHOT and increased assist from staff/family, if this is not possible, short SNF stay needed. Current Level of Function Impacting Discharge (ADLs/self-care): up to max A for ADLs    Functional Outcome Measure: The patient scored Total: 20/100 on the Barthel Index outcome measure which is indicative of 80% impaired ability to care for basic self needs/dependency on others; inferred 100% dependency on others for instrumental ADLs. Other factors to consider for discharge: was mostly supervision for ADLs, lives with elderly      Patient will benefit from skilled therapy intervention to address the above noted impairments. PLAN :  Recommendations and Planned Interventions: self care training, functional mobility training, therapeutic exercise, balance training, therapeutic activities, endurance activities, patient education, home safety training, and family training/education    Frequency/Duration: Patient will be followed by occupational therapy 5 times a week to address goals. Recommendation for discharge: (in order for the patient to meet his/her long term goals)  Therapy up to 5 days/week in SNF setting or an intensive home health therapy program    This discharge recommendation:  Has not yet been discussed the attending provider and/or case management    IF patient discharges home will need the following DME: patient owns DME required for discharge       SUBJECTIVE:   Patient stated I'm home now right? 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,TIA    Unspecified sleep apnea     NO CPAP     Past Surgical History:   Procedure Laterality Date    HX GYN  1963    c section    HX HEENT      status post T&A    HX OOPHORECTOMY      HX ORTHOPAEDIC      status post left total knee replacement    [place] ABDOMEN SURGERY PROC UNLISTED      hernia repair       Expanded or extensive additional review of patient history:     Home Situation  Home Environment: Assisted living  One/Two Story Residence: One story  Living Alone: No  Support Systems: Child(ирина), Spouse/Significant Other/Partner  Patient Expects to be Discharged to[de-identified] Other (comment)(daughter states she wants mother to discharge home with her)  Current DME Used/Available at Home: Kemar Sample, rollator, Shower chair, Grab bars  Tub or Shower Type: Shower    Hand dominance: Right    EXAMINATION OF PERFORMANCE DEFICITS:  Cognitive/Behavioral Status:  Neurologic State: Alert;Confused  Orientation Level: Oriented to person;Oriented to place; Disoriented to situation;Disoriented to time  Cognition: Appropriate for age attention/concentration; Follows commands  Perception: Appears intact  Perseveration: No perseveration noted  Safety/Judgement: Awareness of environment; Fall prevention    Skin: appears grossly intact    Edema: none noted in BUEs    Hearing: Auditory  Auditory Impairment: None    Vision/Perceptual:    Tracking: Able to track stimulus in all quadrants w/o difficulty    Diplopia: No    Acuity: Within Defined Limits    Corrective Lenses: Reading glasses    Range of Motion:  In BUEs  AROM: Generally decreased, functional     Strength: In BUEs  Strength: Generally decreased, functional    Coordination:  Coordination: Generally decreased, functional  Fine Motor Skills-Upper: Left Intact; Right Intact    Gross Motor Skills-Upper: Left Intact; Right Intact    Tone & Sensation:  In BUEs  Tone: Normal  Sensation: Intact    Balance:  Sitting: Impaired  Sitting - Static: Good (unsupported)  Sitting - Dynamic: Fair (occasional)  Standing: Impaired; With support  Standing - Static: Constant support; Fair  Standing - Dynamic : Constant support;Fair;Poor    Functional Mobility and Transfers for ADLs:  Bed Mobility:  Supine to Sit: Minimum assistance  Scooting: Minimum assistance    Transfers:  Sit to Stand: Moderate assistance  Stand to Sit: Minimum assistance  Bed to Chair: Moderate assistance; Adaptive equipment; Additional time;Assist x1  Toilet Transfer : Moderate assistance; Adaptive equipment; Additional time;Assist x1(Infer)  Assistive Device : Gait Belt;Walker, rolling    ADL Assessment:  Feeding: Setup*    Oral Facial Hygiene/Grooming: Minimum assistance*    Bathing: Moderate assistance*    Upper Body Dressing: Minimum assistance    Lower Body Dressing: Maximum assistance    Toileting: Maximum assistance    *Infer per observation of balance, strength, coordination, cognition    ADL Intervention and task modifications:    Upper Body [address]: Maximum assistance    Lower Body Dressing Assistance  Protective Undergarmet: Total assistance (dependent)  Leg Crossed Method Used: No  Position Performed: Seated in chair  Cues: Don;Physical assistance;Verbal cues provided    Toileting  Bladder Hygiene: Maximum assistance  Bowel Hygiene: Maximum assistance  Clothing Management: Total assistance (dependent)  Cues: Physical assistance for pants up;Physical assistance for pants down; Tactile cues provided;Verbal cues provided  Adaptive Equipment: Walker    Cognitive Retraining  Orientation Retraining: Reorienting;Place;Situation;Time  Safety/Judgement: Awareness of environment; Fall prevention    Functional Measure:  Barthel Index:    Bathin  Bladder: 0  Bowels: 5  Groomin  Dressin  Feedin  Mobility: 0  Stairs: 0  Toilet Use: 5  Transfer (Bed to Chair and Back): 5  Total: 20/100 The Barthel ADL Index: Guidelines  1. The index should be used as a record of what a patient does, not as a record of what a patient could do. 2. The main aim is to establish degree of independence from any help, physical or verbal, however minor and for whatever reason. 3. The need for supervision renders the patient not independent. 4. A patient's performance should be established using the best available evidence. Asking the patient, friends/relatives and nurses are the usual sources, but direct observation and common sense are also important. However direct testing is not needed. 5. Usually the patient's performance over the preceding 24-48 hours is important, but occasionally longer periods will be relevant. 6. Middle categories imply that the patient supplies over 50 per cent of the effort. 7. Use of aids to be independent is allowed. Mally Mendoza., Barthel, D.W. ([id number]). Functional evaluation: the Barthel Index. [facility] (14)2. Antonio Shin curt PAUL Whittaker, Prieto Garcia., Vamshi Toro., [place], [address] [facility] (1999). Measuring the change indisability after inpatient rehabilitation; comparison of the responsiveness of the Barthel Index and Functional [place] Measure. Journal of Neurology, Neurosurgery, and Psychiatry, 66(4), 130-434. Cisco Keane, N.J.A, YAIMA Saleem, & Phil Baeza MJUVENAL. (2004.) Assessment of post-stroke quality of life in cost-effectiveness studies: The usefulness of the Barthel Index and the EuroQoL-5D. Quality of Life Research, [address] [facility], 758-08       Occupational Therapy Evaluation Charge Determination   History Examination Decision-Making   LOW Complexity : Brief history review  MEDIUM Complexity : 3-5 performance deficits relating to physical, cognitive , or psychosocial skils that result in activity limitations and / or participation restrictions HIGH Complexity : Patient presents with comorbidities that affect occupational performance.  Signifigant modification of tasks or assistance (eg, physical or verbal) with assessment (s) is necessary to enable patient to complete evaluation       Based on the above components, the patient evaluation is determined to be of the following complexity level: MEDIUM  Pain Rating:  Patient reporting no pain    Activity Tolerance:   Fair, desaturates with exertion and requires oxygen, and requires rest breaks    After treatment patient left in no apparent distress:    Sitting in chair, Call bell within reach, Bed / chair alarm activated, and RN aware    COMMUNICATION/EDUCATION:   The patients plan of care was discussed with: Physical therapist and Registered nurse. Home safety education was provided and the patient/caregiver indicated understanding. and Patient/family have participated as able in goal setting and plan of care. This patients plan of care is appropriate for delegation to [facility].     Thank you for this referral.  Penny Mcgowan OT  Time Calculation: 29 mins

## 2021-02-23 NOTE — DISCHARGE INSTRUCTIONS
Discharge Instructions       PATIENT ID: Opal Saini  MRN: [id number]   YOB: 1930    DATE OF ADMISSION: 2/20/2021  8:15 PM    DATE OF DISCHARGE: 2/23/2021    PRIMARY CARE PROVIDER: Aura Fishman MD     ATTENDING PHYSICIAN: Emilee Kim MD  DISCHARGING PROVIDER: Preet Ramirez MD    To contact this individual call [phone] and ask the  to page. If unavailable ask to be transferred the Adult Hospitalist Department. DISCHARGE DIAGNOSES sepsis? CONSULTATIONS: IP CONSULT TO NEUROLOGY    PROCEDURES/SURGERIES: * No surgery found *    FOLLOW UP APPOINTMENTS:   Follow-up Information     Follow up With Specialties Details Why Contact Info    Aura Fishman MD Internal Medicine In 1 week  [address]  [address] [facility]  [phone]             ADDITIONAL CARE RECOMMENDATIONS:   Follow up with your PCP as scheduled     DIET: Cardiac Diet    ACTIVITY: Activity as tolerated      DISCHARGE MEDICATIONS:   See Medication Reconciliation Form    · It is important that you take the medication exactly as they are prescribed. · Keep your medication in the bottles provided by the pharmacist and keep a list of the medication names, dosages, and times to be taken in your wallet. · Do not take other medications without consulting your doctor. NOTIFY YOUR PHYSICIAN FOR ANY OF THE FOLLOWING:   Fever over 101 degrees for 24 hours. Chest pain, shortness of breath, fever, chills, nausea, vomiting, diarrhea, change in mentation, falling, weakness, bleeding. Severe pain or pain not relieved by medications. Or, any other signs or symptoms that you may have questions about.       DISPOSITION:    Home With:   OT  PT  HH  RN       SNF/Inpatient Rehab/LTAC    Independent/assisted living    Hospice    Other:     CDMP Checked:   Yes ***     PROBLEM LIST Updated:  Yes ***       Information obtained by :   I understand that if any problems occur once I am at home I am to contact my physician. I understand and acknowledge receipt of the instructions indicated above.                                                                                                                                              Physician's or R.N.'s Signature                                                                  Date/Time                                                                                                                                              Patient or Representative Signature                                                          Date/Time          Signed:   Alisson Gardner MD  2/23/2021  2:03 PM

## 2021-02-23 NOTE — PROGRESS NOTES
Transition of Care: back to [facility] assisted living [facility]) (where patient is a resident)     Transport Plan: in car with daughter Shirley Montoya [phone]     RUR: 17%     Dx: sepsis     CM noted discharge order     1455: CM met with patient and her daughter at bedside; patient is going back to [facility] and her daughter will transport her in the car; both are in agreement; no other CM needs noted or are needed      Medicare pt (patients daughter- Shirley Montoya)  has received, reviewed, and signed 2nd IM letter informing them of their right to appeal the discharge. Signed copy has been placed on pt bedside chart.     CM following  Alessandro Whittaker RN, CRM

## 2021-02-23 NOTE — ROUTINE PROCESS
Discharge instructions explained to daughter Tanika Price. No changes to medications, IV removed, belongings sent with daughter. Patient taken out via wheelchair by volunteer and myself.

## 2021-02-23 NOTE — DISCHARGE SUMMARY
Inpatient hospitalist discharge summary                Brief Overview    PATIENT ID: Sarah Echeverria    MRN: [id number]     YOB: 1930    Admitting Provider: Stef Blair MD    Discharging Provider: Julieth Escobedo MD   To contact this individual call [phone] and ask the  to page. If unavailable ask to be transferred the Adult Hospitalist Department.       PCP at discharge: Jonatan Locke [phone]   [address] [facility] / [address] [place] [address]    Admission date: 2/20/2021  Date of Discharge: 02/23/21    Chief complaint:   Chief Complaint   Patient presents with    Generalized Body Aches    Fatigue    Cough     Patient Active Problem List   Diagnosis Code    Cerebral thrombosis with cerebral infarction ([facility] [address] I63.30    COPD (chronic obstructive pulmonary disease) ([address] J44.9    Chronic respiratory failure with hypoxia ([address] J96.11    Sinus tachycardia R00.0    S/P knee replacement Z96.659    Obstructive sleep apnea G47.33    Dyslipidemia E78.5    Debility R53.81    Nausea and vomiting R11.2    UTI (urinary tract infection) N39.0    Hereditary chorea ([address] G10    Late onset Alzheimer's disease without behavioral disturbance (HCC) G30.1, F02.80    GI bleed K92.2    GIB (gastrointestinal bleeding) K92.2    Sepsis ([address] A41.9         Discharge diagnosis, hospital course/plan:  As per initial admission summary  This is a 80-year-old woman with a past medical history significant for oxygen-dependent COPD, obstructive sleep apnea, dyslipidemia, Alzheimer's dementia, was in her usual state of health until the day of her presentation at the emergency room when it was reported that the patient developed weakness and cough.  The weakness was described as generalized weakness.  The cough is productive of yellowish sputum.  About a week ago, the patient had multiple episodes of vomiting.  She completed the second dose of COVID-19 vaccination yesterday. Judah Echeverria patient most likely received the Pfizer vaccine.  Today, the patient developed a fever at the assisted living facility where the patient resides. [facility] of that, she was sent to the emergency room.  The patient has Alzheimer's dementia and unable to provide good history.  When the patient arrived at the emergency room, the initial temperature was 99.7, this went up to 101.8 and heart rate of 111.  The patient was also found to have elevated lactic acid level.  Because of that, code sepsis was called in the emergency room.  The patient received broad-spectrum antibiotics and the code sepsis protocol was initiated and there was no clear source of infection, she was subsequently referred to the hospitalist service for evaluation for admission.  The patient was last admitted to this hospital from 10/01/2020 to 10/05/2020.  The patient was admitted with GI bleed for which the patient received evaluation and treatment.  During that hospitalization, the patient was also found to have elevated troponin level.  Echocardiogram was performed.  No intervention was carried out.  Outpatient followup by the nephrologist was advised    1.  Sepsis. ?  LA 2.1 ON PRESENTATION  which resolved   Will stop abx as no signs of infection   Blood culture NGTD    CT chest did not show anything acute. Ada Gin calcitonin is less than 0.05   No signs of infection  D/w daughter , she is in agreement with discharge planning      Left PICA:Acute/subacute vs chronic? Ct head showed left PICA  Neurology signed off. No acute changes      2.  Chronic respiratory failure with hypoxia.    The patient wears 2 liters of oxygen at home, especially at bedtime.  We will continue with supplemental oxygen.  This is most likely due to COPD.    troponin negative bnp 618  currnelty her oxygenation is at baseline ,   Stable      3.  COPD.    Stable      .  Alzheimer's dementia.  We will continue with Namenda and supportive treatment.     7.  Acute kidney injury. Resolved        On the date of discharge, diagnostic face to face encounter was performed. Patient was hemodynamically stable, offering no new complaints. Denies any shortness of breath at rest, no fevers or chills, no diarrhea or constipation. Patient is agreeable for discharge. Patient understood and verbalized the understanding of the discharge plan. Patient was advised to seek medical help/ care or return to ED, if symptoms recur, worsen or new symptoms develop. Discharge Disposition:  Home or Self Care    Discharge activity:  Activity as tolerated    Code status at discharge:  DNR     Outpatient follow up:  Please follow up with your PCP in one week      Future appointments-  No future appointments. Follow-up Information     Follow up With Specialties Details Why Contact Info    Reina Choudhury MD Internal Medicine In 1 week  [address]  [address]  [phone]            Operative procedures performed:      Consults:  IP CONSULT TO NEUROLOGY    Procedures:  * No surgery found *    Diet:  DIET CARDIAC    Pertinent test results:  Nm Lung Scan Perf    Result Date: 2/22/2021  Clinical History: Cough, weakness, history of COPD Comparison to chest radiograph dated 2/21/2021 Ventilation: Ventilation images were not obtained due to Covid precautions. Perfusion: Perfusion images were obtained in 6 projections utilizing 4.4 mCi Tc-99 M MAA. There is normal tracer distribution without focal defect. Very low probability for pulmonary embolism. Ct Head Wo Cont    Result Date: 2/21/2021  EXAM: CT HEAD WO CONT INDICATION: Generalized weakness COMPARISON: 7/21/2020. CONTRAST: None. TECHNIQUE: Unenhanced CT of the head was performed using 5 mm images. Brain and bone windows were generated. Coronal and sagittal reformats. CT dose reduction was achieved through use of a standardized protocol tailored for this examination and automatic exposure control for dose modulation.   FINDINGS: The ventricles and sulci are, in size, shape and configuration. There is stable periventricular white matter hypodensity. Stable left PICA infarct is present. Heavy calcification is noted of the vertebrobasilar insufficiency syndrome. There is no intracranial hemorrhage, extra-axial collection, or mass effect. The basilar cisterns are open. No CT evidence of acute infarct. The bone windows demonstrate no abnormalities. The visualized portions of the paranasal sinuses and mastoid air cells are clear. Stable moderate presumed small vessel ischemic disease. Left PICA infarct. Ct Chest Wo Cont    Result Date: 2/21/2021  INDICATION: Shortness of breath COMPARISON: 3/31/2013 CONTRAST: None. TECHNIQUE:  5 mm axial images were obtained through the chest. Coronal and sagittal reformats were generated. CT dose reduction was achieved through use of a standardized protocol tailored for this examination and automatic exposure control for dose modulation. The absence of intravenous contrast reduces the sensitivity for evaluation of the mediastinum, franchesca, vasculature, and upper abdominal organs. FINDINGS: CHEST WALL: No mass or axillary lymphadenopathy. THYROID: No nodule. MEDIASTINUM: Stable enlarged precarinal 1.6 x 1.1 cm node. FRANCHESCA: No mass or lymphadenopathy. THORACIC AORTA: No aneurysm. MAIN PULMONARY ARTERY: Interval enlargement of right main pulmonary artery as compared to the prior study from 2 cm to 2.7 cm. TRACHEA/BRONCHI: Patent. ESOPHAGUS: No wall thickening or dilatation. HEART: Coronary artery calcification. Cardiomegaly. PLEURA: No effusion or pneumothorax. LUNGS: No nodule, mass, or airspace disease. INCIDENTALLY IMAGED UPPER ABDOMEN: Layering gallstones. BONES: No destructive bone lesion. No focal pulmonary process seen. Interval increase in size of some right main pulmonary artery, suggesting pulmonary arterial hypertension. Underlying cardiomegaly, coronary artery disease, gallstones.     Xr Chest Port    Result Date: 2/20/2021  EXAM: XR CHEST PORT INDICATION: Fatigue today. Vomiting last week. Second COVID19 vaccine dose one day ago. COMPARISON: Chest views on 7/21/2020. CT chest on 3/31/2013. TECHNIQUE: Upright portable chest AP view FINDINGS: The cardiomediastinal and hilar contours are within normal limits. The pulmonary vasculature is within normal limits. Chronic interstitial lung disease is mild and unchanged. No evidence of pneumonia. Pleural spaces are clear. Bones are osteopenic. No acute process on portable chest. Mild chronic interstitial lung disease. Recent Results (from the past 168 hour(s))   SAMPLES BEING HELD    Collection Time: 02/20/21  8:33 PM   Result Value Ref Range    SAMPLES BEING HELD 1 RED , 1 PREMA     COMMENT        Add-on orders for these samples will be processed based on acceptable specimen integrity and analyte stability, which may vary by analyte. CBC WITH AUTOMATED DIFF    Collection Time: 02/20/21  8:33 PM   Result Value Ref Range    WBC 7.3 3.6 - 11.0 K/uL    RBC 5.00 3.80 - 5.20 M/uL    HGB 13.6 11.5 - 16.0 g/dL    HCT 42.2 35.0 - 47.0 %    MCV 84.4 80.0 - 99.0 FL    MCH 27.2 26.0 - 34.0 PG    MCHC 32.2 30.0 - 36.5 g/dL    RDW 17.4 (H) 11.5 - 14.5 %    PLATELET 795 532 - 458 K/uL    MPV 10.0 8.9 - 12.9 FL    NRBC 0.0 0  WBC    ABSOLUTE NRBC 0.00 0.00 - 0.01 K/uL    NEUTROPHILS 79 (H) 32 - 75 %    LYMPHOCYTES 11 (L) 12 - 49 %    MONOCYTES 10 5 - 13 %    EOSINOPHILS 0 0 - 7 %    BASOPHILS 0 0 - 1 %    IMMATURE GRANULOCYTES 0 0.0 - 0.5 %    ABS. NEUTROPHILS 5.7 1.8 - 8.0 K/UL    ABS. LYMPHOCYTES 0.8 0.8 - 3.5 K/UL    ABS. MONOCYTES 0.7 0.0 - 1.0 K/UL    ABS. EOSINOPHILS 0.0 0.0 - 0.4 K/UL    ABS. BASOPHILS 0.0 0.0 - 0.1 K/UL    ABS. IMM.  GRANS. 0.0 0.00 - 0.04 K/UL    DF AUTOMATED     METABOLIC PANEL, COMPREHENSIVE    Collection Time: 02/20/21  8:33 PM   Result Value Ref Range    Sodium 139 136 - 145 mmol/L    Potassium 3.8 3.5 - 5.1 mmol/L    Chloride 103 97 - 108 mmol/L    CO2 30 21 - 32 mmol/L    Anion gap 6 5 - 15 mmol/L    Glucose 119 (H) 65 - 100 mg/dL    BUN 16 6 - 20 MG/DL    Creatinine 1.28 (H) 0.55 - 1.02 MG/DL    BUN/Creatinine ratio 13 12 - 20      GFR est AA 47 (L) >60 ml/min/1.73m2    GFR est non-AA 39 (L) >60 ml/min/1.73m2    Calcium 9.3 8.5 - 10.1 MG/DL    Bilirubin, total 0.7 0.2 - 1.0 MG/DL    ALT (SGPT) 12 12 - 78 U/L    AST (SGOT) 14 (L) 15 - 37 U/L    Alk.  phosphatase 114 45 - 117 U/L    Protein, total 8.5 (H) 6.4 - 8.2 g/dL    Albumin 3.4 (L) 3.5 - 5.0 g/dL    Globulin 5.1 (H) 2.0 - 4.0 g/dL    A-G Ratio 0.7 (L) 1.1 - 2.2     TROPONIN I    Collection Time: 02/20/21  8:33 PM   Result Value Ref Range    Troponin-I, Qt. <0.05 <0.05 ng/mL   CULTURE, BLOOD    Collection Time: 02/20/21  8:33 PM    Specimen: Blood   Result Value Ref Range    Special Requests: NO SPECIAL REQUESTS      Culture result: NO GROWTH 3 DAYS     CULTURE, BLOOD    Collection Time: 02/20/21  8:33 PM    Specimen: Blood   Result Value Ref Range    Special Requests: NO SPECIAL REQUESTS      Culture result: NO GROWTH 3 DAYS     MAGNESIUM    Collection Time: 02/20/21  8:33 PM   Result Value Ref Range    Magnesium 1.7 1.6 - 2.4 mg/dL   PHOSPHORUS    Collection Time: 02/20/21  8:33 PM   Result Value Ref Range    Phosphorus 3.0 2.6 - 4.7 MG/DL   LACTIC ACID    Collection Time: 02/20/21  8:33 PM   Result Value Ref Range    Lactic acid 2.1 (HH) 0.4 - 2.0 MMOL/L   EKG, 12 LEAD, INITIAL    Collection Time: 02/20/21  8:54 PM   Result Value Ref Range    Ventricular Rate 98 BPM    Atrial Rate 98 BPM    P-R Interval 222 ms    QRS Duration 84 ms    Q-T Interval 360 ms    QTC Calculation (Bezet) 459 ms    Calculated P Axis 43 degrees    Calculated R Axis -8 degrees    Calculated T Axis 88 degrees    Diagnosis       Sinus rhythm with 1st degree AV block  Inferior infarct (cited on or before 11-MAR-2013)  When compared with ECG of 01-OCT-2020 10:21,  WV interval has increased  Inverted T waves have replaced nonspecific T wave abnormality in Lateral   leads  Confirmed by Lydia Gonzalez ([id number]) on 2/21/2021 10:17:55 AM     URINALYSIS W/MICROSCOPIC    Collection Time: 02/20/21 11:08 PM   Result Value Ref Range    Color YELLOW/STRAW      Appearance CLEAR CLEAR      Specific gravity 1.019 1.003 - 1.030      pH (UA) 7.0 5.0 - 8.0      Protein Negative NEG mg/dL    Glucose Negative NEG mg/dL    Ketone Negative NEG mg/dL    Bilirubin Negative NEG      Blood Negative NEG      Urobilinogen 1.0 0.2 - 1.0 EU/dL    Nitrites Negative NEG      Leukocyte Esterase Negative NEG      WBC 0-4 0 - 4 /hpf    RBC 0-5 0 - 5 /hpf    Epithelial cells FEW FEW /lpf    Bacteria Negative NEG /hpf    Hyaline cast 0-2 0 - 5 /lpf   URINE CULTURE HOLD SAMPLE    Collection Time: 02/20/21 11:08 PM    Specimen: Serum; Urine   Result Value Ref Range    Urine culture hold        Urine on hold in Microbiology dept for 2 days. If unpreserved urine is submitted, it cannot be used for addtional testing after 24 hours, recollection will be required. SARS-COV-2    Collection Time: 02/21/21  1:46 AM   Result Value Ref Range    SARS-CoV-2 Please find results under separate order     SARS-COV-2, PCR    Collection Time: 02/21/21  1:46 AM    Specimen: Nasopharyngeal   Result Value Ref Range    Specimen source Nasopharyngeal      SARS-CoV-2 Not detected NOTD     METABOLIC PANEL, COMPREHENSIVE    Collection Time: 02/21/21  3:25 AM   Result Value Ref Range    Sodium 142 136 - 145 mmol/L    Potassium 3.7 3.5 - 5.1 mmol/L    Chloride 107 97 - 108 mmol/L    CO2 28 21 - 32 mmol/L    Anion gap 7 5 - 15 mmol/L    Glucose 99 65 - 100 mg/dL    BUN 14 6 - 20 MG/DL    Creatinine 1.09 (H) 0.55 - 1.02 MG/DL    BUN/Creatinine ratio 13 12 - 20      GFR est AA 57 (L) >60 ml/min/1.73m2    GFR est non-AA 47 (L) >60 ml/min/1.73m2    Calcium 8.6 8.5 - 10.1 MG/DL    Bilirubin, total 0.8 0.2 - 1.0 MG/DL    ALT (SGPT) 9 (L) 12 - 78 U/L    AST (SGOT) 16 15 - 37 U/L    Alk.  phosphatase 92 45 - 117 U/L    Protein, total 7.5 6.4 - 8.2 g/dL    Albumin 3.0 (L) 3.5 - 5.0 g/dL    Globulin 4.5 (H) 2.0 - 4.0 g/dL    A-G Ratio 0.7 (L) 1.1 - 2.2     CBC WITH AUTOMATED DIFF    Collection Time: 02/21/21  3:25 AM   Result Value Ref Range    WBC 6.3 3.6 - 11.0 K/uL    RBC 4.71 3.80 - 5.20 M/uL    HGB 12.7 11.5 - 16.0 g/dL    HCT 40.6 35.0 - 47.0 %    MCV 86.2 80.0 - 99.0 FL    MCH 27.0 26.0 - 34.0 PG    MCHC 31.3 30.0 - 36.5 g/dL    RDW 17.2 (H) 11.5 - 14.5 %    PLATELET 924 496 - 720 K/uL    MPV 10.1 8.9 - 12.9 FL    NRBC 0.0 0  WBC    ABSOLUTE NRBC 0.00 0.00 - 0.01 K/uL    NEUTROPHILS 68 32 - 75 %    LYMPHOCYTES 20 12 - 49 %    MONOCYTES 10 5 - 13 %    EOSINOPHILS 1 0 - 7 %    BASOPHILS 1 0 - 1 %    IMMATURE GRANULOCYTES 0 0.0 - 0.5 %    ABS. NEUTROPHILS 4.3 1.8 - 8.0 K/UL    ABS. LYMPHOCYTES 1.3 0.8 - 3.5 K/UL    ABS. MONOCYTES 0.6 0.0 - 1.0 K/UL    ABS. EOSINOPHILS 0.1 0.0 - 0.4 K/UL    ABS. BASOPHILS 0.0 0.0 - 0.1 K/UL    ABS. IMM.  GRANS. 0.0 0.00 - 0.04 K/UL    DF AUTOMATED     TSH 3RD GENERATION    Collection Time: 02/21/21  3:25 AM   Result Value Ref Range    TSH 2.68 0.36 - 3.74 uIU/mL   LIPASE    Collection Time: 02/21/21  3:25 AM   Result Value Ref Range    Lipase 91 73 - 393 U/L   TROPONIN I    Collection Time: 02/21/21  3:25 AM   Result Value Ref Range    Troponin-I, Qt. <0.05 <0.05 ng/mL   NT-PRO BNP    Collection Time: 02/21/21  3:25 AM   Result Value Ref Range    NT pro- (H) <450 PG/ML   D DIMER    Collection Time: 02/21/21  3:25 AM   Result Value Ref Range    D-dimer 4.04 (H) 0.00 - 0.65 mg/L FEU   RESPIRATORY VIRUS PANEL W/COVID-19, PCR    Collection Time: 02/21/21  3:25 AM    Specimen: Nasopharyngeal   Result Value Ref Range    Adenovirus Not detected NOTD      Coronavirus 229E Not detected NOTD      Coronavirus HKU1 Not detected NOTD      Coronavirus CVNL63 Not detected NOTD      Coronavirus OC43 Not detected NOTD      Metapneumovirus Not detected NOTD      Rhinovirus and Enterovirus Not detected NOTD      Influenza A Not detected NOTD      Influenza A, subtype H1 Not detected NOTD      Influenza A, subtype H3 Not detected NOTD      INFLUENZA A H1N1 PCR Not detected NOTD      Influenza B Not detected NOTD      Parainfluenza 1 Not detected NOTD      Parainfluenza 2 Not detected NOTD      Parainfluenza 3 Not detected NOTD      Parainfluenza virus 4 Not detected NOTD      RSV by PCR Not detected NOTD      B. parapertussis, PCR Not detected NOTD      Bordetella pertussis - PCR Not detected NOTD      Chlamydophila pneumoniae DNA, QL, PCR Not detected NOTD      Mycoplasma pneumoniae DNA, QL, PCR Not detected NOTD      SARS-CoV-2, PCR Not detected NOTD     LACTIC ACID    Collection Time: 02/21/21  3:25 AM   Result Value Ref Range    Lactic acid 1.2 0.4 - 2.0 MMOL/L   PROCALCITONIN    Collection Time: 02/21/21  7:19 AM   Result Value Ref Range    Procalcitonin 0.05 ng/mL   TROPONIN I    Collection Time: 02/21/21 10:30 AM   Result Value Ref Range    Troponin-I, Qt. <0.05 <2.01 ng/mL   METABOLIC PANEL, BASIC    Collection Time: 02/22/21  3:48 AM   Result Value Ref Range    Sodium 142 136 - 145 mmol/L    Potassium 3.6 3.5 - 5.1 mmol/L    Chloride 110 (H) 97 - 108 mmol/L    CO2 26 21 - 32 mmol/L    Anion gap 6 5 - 15 mmol/L    Glucose 77 65 - 100 mg/dL    BUN 13 6 - 20 MG/DL    Creatinine 0.97 0.55 - 1.02 MG/DL    BUN/Creatinine ratio 13 12 - 20      GFR est AA >60 >60 ml/min/1.73m2    GFR est non-AA 54 (L) >60 ml/min/1.73m2    Calcium 8.0 (L) 8.5 - 69.6 MG/DL   METABOLIC PANEL, BASIC    Collection Time: 02/23/21  3:13 AM   Result Value Ref Range    Sodium 140 136 - 145 mmol/L    Potassium 3.5 3.5 - 5.1 mmol/L    Chloride 109 (H) 97 - 108 mmol/L    CO2 27 21 - 32 mmol/L    Anion gap 4 (L) 5 - 15 mmol/L    Glucose 89 65 - 100 mg/dL    BUN 14 6 - 20 MG/DL    Creatinine 0.93 0.55 - 1.02 MG/DL    BUN/Creatinine ratio 15 12 - 20      GFR est AA >60 >60 ml/min/1.73m2    GFR est non-AA 57 (L) >60 ml/min/1.73m2    Calcium 8.3 (L) 8.5 - 10.1 MG/DL           Physical Exam on Discharge:    Discharge condition: good    Vital signs:   Patient Vitals for the past 12 hrs:   Temp Pulse Resp BP SpO2   02/23/21 1148 97.3 °F (36.3 °C) 75 21 (!) 166/73 94 %   02/23/21 0747 98.3 °F (36.8 °C) 76 16 (!) 163/90 91 %   02/23/21 0310 98.4 °F (36.9 °C) 78 17 133/72 92 %       Visit Vitals  BP (!) 166/73 (BP 1 Location: Left arm, BP Patient Position: At rest)   Pulse 75   Temp 97.3 °F (36.3 °C)   Resp 21   Ht 5' 2\" (1.575 m)   Wt 74.3 kg (163 lb 12.8 oz)   SpO2 94%   BMI 29.96 kg/m²     General:  Alert, cooperative, no distress, appears stated age.   Head:  Normocephalic, without obvious abnormality, atraumatic.   Lungs:   Clear to auscultation bilaterally.   Chest wall:  No tenderness or deformity.   Heart:  Regular rate and rhythm, S1, S2 normal, no murmur, click, rub or gallop.   Abdomen:   Soft, non-tender. Bowel sounds normal. No masses,  No organomegaly.       Current Discharge Medication List      CONTINUE these medications which have NOT CHANGED    Details   fluticasone furoate-vilanteroL (Breo Ellipta) 100-25 mcg/dose inhaler Take 1 Puff by inhalation daily.  Qty: 1 Inhaler, Refills: 3    Associated Diagnoses: Chronic obstructive pulmonary disease, unspecified COPD type (HCC)      memantine (NAMENDA) 10 mg tablet Take 1 Tab by mouth two (2) times a day.  Qty: 180 Tab, Refills: 1    Associated Diagnoses: Late onset Alzheimer's disease without behavioral disturbance ([facility])      clonazePAM (KlonoPIN) 0.5 mg tablet Take 0.5 Tabs by mouth two (2) times a day. Max Daily Amount: 0.5 mg.  Qty: 30 Tab, Refills: 2    Associated Diagnoses: Hereditary chorea (HCC)      pantoprazole (Protonix) 40 mg tablet Take 1 Tab by mouth daily.  Qty: 30 Tab, Refills: 3    Associated Diagnoses: Gastrointestinal hemorrhage associated with gastritis, unspecified gastritis type      diclofenac (VOLTAREN) 1 % gel Apply  to affected area two (2) times  daily as needed for Pain. Qty: 100 g, Refills: 2    Associated Diagnoses: Primary osteoarthritis of both knees      azelastine (OPTIVAR) 0.05 % ophthalmic solution Administer 1 Drop to right eye two (2) times daily as needed (allergy). Use in affected eye(s)  Qty: 6 mL, Refills: 1    Associated Diagnoses: Urticaria      acetaminophen (TYLENOL) 500 mg tablet Take 1 Tab by mouth two (2) times daily as needed for Pain. Qty: 60 Tab, Refills: 3    Associated Diagnoses: Pain of left hip joint      aspirin delayed-release (ECOTRIN LOW STRENGTH) 81 mg tablet Take 1 Tab by mouth daily. Qty: 30 Tab, Refills: 12    Associated Diagnoses: Cerebral thrombosis with cerebral infarction ([address]      calcium citrate-vitamin d3 (CITRACAL+D) 315-200 mg-unit tab Take 1 Tab by mouth daily (with breakfast).   Qty: 30 Tab, Refills: 12    Associated Diagnoses: Post-menopause               Total time spent on discharge planning, counseling and co-ordination of care:   35 minutes    Sharee Hung MD  02/23/21  1:55 PM

## 2021-02-23 NOTE — PROGRESS NOTES
Physician Progress Note      PATIENT:               Juliano Jeronimo  CSN #:                  [id number]  :                       10/21/1930  ADMIT DATE:       2021 8:15 PM  [address] [place] DATE:        2021 5:42 PM  RESPONDING  PROVIDER #:        RENU Grubbs MD          QUERY TEXT:    Patient admitted with sepsis. Noted documentation of Acute Kidney Injury in H&P. In order to support the diagnosis of SANIYA, please include additional clinical indicators in your documentation. Or please document if the diagnosis of SANIYA has been ruled out after further study. The medical record reflects the following:  Risk Factors: dehydration    Clinical Indicators:    Cr 1.28-> 1.09-> 0.97 (Oct 2020 Cr 0.90 - 1.29)  GFR 57-> >60 (Oct 2020 47 - >60)      Treatment: monitor BMP; 0.9% NS 1,313ml bolus; Defined by Kidney Disease Improving Global Outcomes (KDIGO) clinical practice guideline for acute kidney injury:  -Increase in SCr by greater than or equal to 0.3 mg/dl within 48 hours; or  -Increase in SCr to greater than or equal to 1.5 times baseline, which is known or presumed to have occurred within the prior 7 days; or  -Urine volume < 0.5ml/kg/h for 6 hours  Options provided:  -- Acute kidney injury evidenced by, Please document evidence as well as baseline creatinine, if known. -- Currently resolved acute kidney injury was evidenced by, Please document evidence as well as baseline creatinine, if known. -- Acute kidney injury ruled out after study  -- Other - I will add my own diagnosis  -- Disagree - Not applicable / Not valid  -- Disagree - Clinically unable to determine / Unknown  -- Refer to Clinical Documentation Reviewer    PROVIDER RESPONSE TEXT:    This patient has an acute kidney injury as evidenced by -Increase in SCr by greater than or equal to 0.3 mg/dl within 48 hours    Query created by: Deborah Armas on 2021 2:50 PM      QUERY TEXT:    Patient admitted with cough, weakness.  Noted documentation of sepsis in H&P. In order to support the diagnosis of sepsis, please include additional clinical indicators in your documentation. Or please document if the diagnosis of sepsis has been ruled out after further study. The medical record reflects the following:  Risk Factors: COPD;    Clinical Indicators:    WBC 7.3-> 6.3  Lactate 2.1-> 1.2  Procal 0.05  RR 13 - 22 93%  HR 75 - 111  T 101.8    DC Summary-  1. ?Sepsis. ?  LA 2.1 ON PRESENTATION? ? which resolved? Will stop abx as no signs of infection  Blood culture NGTD? ?  CT chest did not show anything acute. Grecia Money ?pro calcitonin is less than 0.05? No signs of infection    Treatment: BCx; CT Chest; Cefepime 2g IV BID; Vanc 750mg IV daily    Thank you,  Drake Wheat, RN, BSN, SMART  Clinical   [phone]  Options provided:  -- Sepsis present as evidenced by, Please document evidence. -- Sepsis was ruled out after study  -- Other - I will add my own diagnosis  -- Disagree - Not applicable / Not valid  -- Disagree - Clinically unable to determine / Unknown  -- Refer to Clinical Documentation Reviewer    PROVIDER RESPONSE TEXT:    Sepsis was ruled out after study.     Query created by: Lloyd Saenz on 2/23/2021 2:24 PM      Electronically signed by:  RENU Villar MD 2/23/2021 6:16 PM

## 2021-02-23 NOTE — PROGRESS NOTES
Problem: Mobility Impaired (Adult and Pediatric)  Goal: *Acute Goals and Plan of Care (Insert Text)  Description: FUNCTIONAL STATUS PRIOR TO ADMISSION: unclear as patient is poor historian; from assisted living and ambulates with walker but unclear on amount of assist    [facility]: assisted living    Physical Therapy Goals  Initiated 2/22/2021  1. Patient will move from supine to sit and sit to supine  and roll side to side in bed with supervision/set-up within 7 day(s). 2.  Patient will transfer from bed to chair and chair to bed with minimal assistance/contact guard assist using the least restrictive device within 7 day(s). 3.  Patient will perform sit to stand with minimal assistance/contact guard assist within 7 day(s). 4.  Patient will ambulate with supervision/set-up for 50 feet with the least restrictive device within 7 day(s). Outcome: Progressing Towards Goal     PHYSICAL THERAPY TREATMENT  Patient: Sasha Franklin (65 y.o. female)  Date: 2/23/2021  Diagnosis: Sepsis ([facility] [address] [A41.9] Sepsis ([facility] [address]       Precautions: Fall  Chart, physical therapy assessment, plan of care and goals were reviewed. ASSESSMENT  Patient continues with skilled PT services and is progressing towards goals. Pt received sitting in recliner with nasal canula not attached, after replacing 02 pt recovered to 95% on 2L. Pt willing to work with therapy, presenting with mild confusion but oriented x3 and good command following with some delay. Pt was able to tolerate fwrd gait training with RW and chair follow due to low activity tolerance. Pt presenting with posterior lean with STS needing Min A to correct. Pt observed being SOB however she was unable to recognize  she was with 02 at 88% with activity. Pt increased to 3L during activity at 93%. Pt completed session seated in recliner with LE elevated, call bell within reach and all needs met at the time. Rn notified of session.      Current Level of Function Impacting Discharge (mobility/balance): min A for STS from chair, Min/CGA for amb up to 5' fwrd only with RW    Other factors to consider for discharge: general weakness, low tolerance for activity, 02 dependent at this time, poor safety awarness. PLAN :  Patient continues to benefit from skilled intervention to address the above impairments. Continue treatment per established plan of care. to address goals. 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 Kaitlyn Christian    OBJECTIVE DATA SUMMARY:   Critical Behavior:  Neurologic State: Alert, Confused  Orientation Level: Oriented to person, Oriented to place, Disoriented to situation, Disoriented to time  Cognition: Appropriate for age attention/concentration, Follows commands  Safety/Judgement: Awareness of environment, Fall prevention  Functional Mobility Training:  Bed Mobility:  Supine to Sit: Minimum assistance  Scooting: Minimum assistance     Transfers:  Sit to Stand: Moderate assistance  Stand to Sit: Minimum assistance     Balance:  Sitting: Impaired  Sitting - Static: Fair (occasional)  Sitting - Dynamic: Fair (occasional)  Standing: Impaired  Standing - Static: Constant support; Fair  Standing - Dynamic : Constant support;Fair;Poor    Ambulation/Gait Training:  Distance (ft): 5 Feet (ft)  Assistive Device: Gait belt;Walker, rolling  Ambulation - Level of Assistance: Minimal assistance;Contact guard assistance  Gait Abnormalities: Decreased step clearance  Base of Support: Narrowed  Speed/Patti: Pace decreased (<100 feet/min); Shuffled  Step Length: Right shortened;Left shortened       Activity Tolerance:   Fair, desaturates with exertion and requires oxygen and observed SOB with activity    After treatment patient left in no apparent distress:   Sitting in chair, Call bell within reach and Bed / chair alarm activated    COMMUNICATION/COLLABORATION:   The patients plan of care was discussed with: Registered nurse.      Lorraine Moreno PTA   Time Calculation: 22 mins

## 2021-02-23 NOTE — PROGRESS NOTES
Problem: Pressure Injury - Risk of  Goal: *Prevention of pressure injury  Description: Document Alejandro Scale and appropriate interventions in the flowsheet.   2/23/2021 1426 by Kyle Roe RN  Outcome: Resolved/Met  Note: Pressure Injury Interventions:  Sensory Interventions: Assess changes in LOC    Moisture Interventions: Absorbent underpads    Activity Interventions: Increase time out of bed    Mobility Interventions: Pressure redistribution bed/mattress (bed type), PT/OT evaluation    Nutrition Interventions: Document food/fluid/supplement intake                  2/23/2021 1425 by Dana LARA RN  Outcome: Progressing Towards Goal  Note: Pressure Injury Interventions:  Sensory Interventions: Assess changes in LOC    Moisture Interventions: Absorbent underpads    Activity Interventions: Increase time out of bed    Mobility Interventions: Pressure redistribution bed/mattress (bed type), PT/OT evaluation    Nutrition Interventions: Document food/fluid/supplement intake

## 2021-02-24 ENCOUNTER — PATIENT OUTREACH (OUTPATIENT)
Dept: CASE MANAGEMENT | Age: 86
End: 2021-02-24

## 2021-02-25 ENCOUNTER — PATIENT OUTREACH (OUTPATIENT)
Dept: CASE MANAGEMENT | Age: 86
End: 2021-02-25

## 2021-02-26 LAB
BACTERIA SPEC CULT: NORMAL
BACTERIA SPEC CULT: NORMAL
SERVICE CMNT-IMP: NORMAL
SERVICE CMNT-IMP: NORMAL

## 2021-03-03 ENCOUNTER — VIRTUAL VISIT (OUTPATIENT)
Dept: INTERNAL MEDICINE CLINIC | Age: 86
End: 2021-03-03
Payer: MEDICARE

## 2021-03-03 DIAGNOSIS — G30.1 LATE ONSET ALZHEIMER'S DISEASE WITHOUT BEHAVIORAL DISTURBANCE (HCC): Primary | ICD-10-CM

## 2021-03-03 DIAGNOSIS — I63.30 CEREBRAL THROMBOSIS WITH CEREBRAL INFARCTION (HCC): ICD-10-CM

## 2021-03-03 DIAGNOSIS — R26.9 GAIT ABNORMALITY: ICD-10-CM

## 2021-03-03 DIAGNOSIS — G10 HEREDITARY CHOREA (HCC): ICD-10-CM

## 2021-03-03 DIAGNOSIS — F02.80 LATE ONSET ALZHEIMER'S DISEASE WITHOUT BEHAVIORAL DISTURBANCE (HCC): Primary | ICD-10-CM

## 2021-03-03 DIAGNOSIS — J44.9 CHRONIC OBSTRUCTIVE PULMONARY DISEASE, UNSPECIFIED COPD TYPE (HCC): ICD-10-CM

## 2021-03-03 PROCEDURE — 1090F PRES/ABSN URINE INCON ASSESS: CPT | Performed by: INTERNAL MEDICINE

## 2021-03-03 PROCEDURE — 3288F FALL RISK ASSESSMENT DOCD: CPT | Performed by: INTERNAL MEDICINE

## 2021-03-03 PROCEDURE — G8510 SCR DEP NEG, NO PLAN REQD: HCPCS | Performed by: INTERNAL MEDICINE

## 2021-03-03 PROCEDURE — 99214 OFFICE O/P EST MOD 30 MIN: CPT | Performed by: INTERNAL MEDICINE

## 2021-03-03 PROCEDURE — G8536 NO DOC ELDER MAL SCRN: HCPCS | Performed by: INTERNAL MEDICINE

## 2021-03-03 PROCEDURE — G8417 CALC BMI ABV UP PARAM F/U: HCPCS | Performed by: INTERNAL MEDICINE

## 2021-03-03 PROCEDURE — G8428 CUR MEDS NOT DOCUMENT: HCPCS | Performed by: INTERNAL MEDICINE

## 2021-03-03 PROCEDURE — 1100F PTFALLS ASSESS-DOCD GE2>/YR: CPT | Performed by: INTERNAL MEDICINE

## 2021-03-03 NOTE — PROGRESS NOTES
Results for orders placed or performed during the hospital encounter of 02/20/21   URINE CULTURE HOLD SAMPLE    Specimen: Serum; Urine   Result Value Ref Range    Urine culture hold        Urine on hold in Microbiology dept for 2 days. If unpreserved urine is submitted, it cannot be used for addtional testing after 24 hours, recollection will be required.    CULTURE, BLOOD    Specimen: Blood   Result Value Ref Range    Special Requests: NO SPECIAL REQUESTS      Culture result: NO GROWTH 6 DAYS     CULTURE, BLOOD    Specimen: Blood   Result Value Ref Range    Special Requests: NO SPECIAL REQUESTS      Culture result: NO GROWTH 6 DAYS     RESPIRATORY VIRUS PANEL W/COVID-19, PCR    Specimen: Nasopharyngeal   Result Value Ref Range    Adenovirus Not detected NOTD      Coronavirus 229E Not detected NOTD      Coronavirus HKU1 Not detected NOTD      Coronavirus CVNL63 Not detected NOTD      Coronavirus OC43 Not detected NOTD      Metapneumovirus Not detected NOTD      Rhinovirus and Enterovirus Not detected NOTD      Influenza A Not detected NOTD      Influenza A, subtype H1 Not detected NOTD      Influenza A, subtype H3 Not detected NOTD      INFLUENZA A H1N1 PCR Not detected NOTD      Influenza B Not detected NOTD      Parainfluenza 1 Not detected NOTD      Parainfluenza 2 Not detected NOTD      Parainfluenza 3 Not detected NOTD      Parainfluenza virus 4 Not detected NOTD      RSV by PCR Not detected NOTD      B. parapertussis, PCR Not detected NOTD      Bordetella pertussis - PCR Not detected NOTD      Chlamydophila pneumoniae DNA, QL, PCR Not detected NOTD      Mycoplasma pneumoniae DNA, QL, PCR Not detected NOTD      SARS-CoV-2, PCR Not detected NOTD     SARS-COV-2, PCR    Specimen: Nasopharyngeal   Result Value Ref Range    Specimen source Nasopharyngeal      SARS-CoV-2 Not detected NOTD     SAMPLES BEING HELD   Result Value Ref Range    SAMPLES BEING HELD 1 RED , 1 PREMA     COMMENT        Add-on orders for these samples will be processed based on acceptable specimen integrity and analyte stability, which may vary by analyte.   CBC WITH AUTOMATED DIFF   Result Value Ref Range    WBC 7.3 3.6 - 11.0 K/uL    RBC 5.00 3.80 - 5.20 M/uL    HGB 13.6 11.5 - 16.0 g/dL    HCT 42.2 35.0 - 47.0 %    MCV 84.4 80.0 - 99.0 FL    MCH 27.2 26.0 - 34.0 PG    MCHC 32.2 30.0 - 36.5 g/dL    RDW 17.4 (H) 11.5 - 14.5 %    PLATELET 221 150 - 400 K/uL    MPV 10.0 8.9 - 12.9 FL    NRBC 0.0 0  WBC    ABSOLUTE NRBC 0.00 0.00 - 0.01 K/uL    NEUTROPHILS 79 (H) 32 - 75 %    LYMPHOCYTES 11 (L) 12 - 49 %    MONOCYTES 10 5 - 13 %    EOSINOPHILS 0 0 - 7 %    BASOPHILS 0 0 - 1 %    IMMATURE GRANULOCYTES 0 0.0 - 0.5 %    ABS. NEUTROPHILS 5.7 1.8 - 8.0 K/UL    ABS. LYMPHOCYTES 0.8 0.8 - 3.5 K/UL    ABS. MONOCYTES 0.7 0.0 - 1.0 K/UL    ABS. EOSINOPHILS 0.0 0.0 - 0.4 K/UL    ABS. BASOPHILS 0.0 0.0 - 0.1 K/UL    ABS. IMM. GRANS. 0.0 0.00 - 0.04 K/UL    DF AUTOMATED     METABOLIC PANEL, COMPREHENSIVE   Result Value Ref Range    Sodium 139 136 - 145 mmol/L    Potassium 3.8 3.5 - 5.1 mmol/L    Chloride 103 97 - 108 mmol/L    CO2 30 21 - 32 mmol/L    Anion gap 6 5 - 15 mmol/L    Glucose 119 (H) 65 - 100 mg/dL    BUN 16 6 - 20 MG/DL    Creatinine 1.28 (H) 0.55 - 1.02 MG/DL    BUN/Creatinine ratio 13 12 - 20      GFR est AA 47 (L) >60 ml/min/1.73m2    GFR est non-AA 39 (L) >60 ml/min/1.73m2    Calcium 9.3 8.5 - 10.1 MG/DL    Bilirubin, total 0.7 0.2 - 1.0 MG/DL    ALT (SGPT) 12 12 - 78 U/L    AST (SGOT) 14 (L) 15 - 37 U/L    Alk. phosphatase 114 45 - 117 U/L    Protein, total 8.5 (H) 6.4 - 8.2 g/dL    Albumin 3.4 (L) 3.5 - 5.0 g/dL    Globulin 5.1 (H) 2.0 - 4.0 g/dL    A-G Ratio 0.7 (L) 1.1 - 2.2     URINALYSIS W/MICROSCOPIC   Result Value Ref Range    Color YELLOW/STRAW      Appearance CLEAR CLEAR      Specific gravity 1.019 1.003 - 1.030      pH (UA) 7.0 5.0 - 8.0      Protein Negative NEG mg/dL    Glucose Negative NEG mg/dL    Ketone Negative NEG mg/dL     Bilirubin Negative NEG      Blood Negative NEG      Urobilinogen 1.0 0.2 - 1.0 EU/dL    Nitrites Negative NEG      Leukocyte Esterase Negative NEG      WBC 0-4 0 - 4 /hpf    RBC 0-5 0 - 5 /hpf    Epithelial cells FEW FEW /lpf    Bacteria Negative NEG /hpf    Hyaline cast 0-2 0 - 5 /lpf   TROPONIN I   Result Value Ref Range    Troponin-I, Qt. <0.05 <0.05 ng/mL   MAGNESIUM   Result Value Ref Range    Magnesium 1.7 1.6 - 2.4 mg/dL   PHOSPHORUS   Result Value Ref Range    Phosphorus 3.0 2.6 - 4.7 MG/DL   LACTIC ACID   Result Value Ref Range    Lactic acid 2.1 (HH) 0.4 - 2.0 MMOL/L   METABOLIC PANEL, COMPREHENSIVE   Result Value Ref Range    Sodium 142 136 - 145 mmol/L    Potassium 3.7 3.5 - 5.1 mmol/L    Chloride 107 97 - 108 mmol/L    CO2 28 21 - 32 mmol/L    Anion gap 7 5 - 15 mmol/L    Glucose 99 65 - 100 mg/dL    BUN 14 6 - 20 MG/DL    Creatinine 1.09 (H) 0.55 - 1.02 MG/DL    BUN/Creatinine ratio 13 12 - 20      GFR est AA 57 (L) >60 ml/min/1.73m2    GFR est non-AA 47 (L) >60 ml/min/1.73m2    Calcium 8.6 8.5 - 10.1 MG/DL    Bilirubin, total 0.8 0.2 - 1.0 MG/DL    ALT (SGPT) 9 (L) 12 - 78 U/L    AST (SGOT) 16 15 - 37 U/L    Alk. phosphatase 92 45 - 117 U/L    Protein, total 7.5 6.4 - 8.2 g/dL    Albumin 3.0 (L) 3.5 - 5.0 g/dL    Globulin 4.5 (H) 2.0 - 4.0 g/dL    A-G Ratio 0.7 (L) 1.1 - 2.2     CBC WITH AUTOMATED DIFF   Result Value Ref Range    WBC 6.3 3.6 - 11.0 K/uL    RBC 4.71 3.80 - 5.20 M/uL    HGB 12.7 11.5 - 16.0 g/dL    HCT 40.6 35.0 - 47.0 %    MCV 86.2 80.0 - 99.0 FL    MCH 27.0 26.0 - 34.0 PG    MCHC 31.3 30.0 - 36.5 g/dL    RDW 17.2 (H) 11.5 - 14.5 %    PLATELET 280 125 - 165 K/uL    MPV 10.1 8.9 - 12.9 FL    NRBC 0.0 0  WBC    ABSOLUTE NRBC 0.00 0.00 - 0.01 K/uL    NEUTROPHILS 68 32 - 75 %    LYMPHOCYTES 20 12 - 49 %    MONOCYTES 10 5 - 13 %    EOSINOPHILS 1 0 - 7 %    BASOPHILS 1 0 - 1 %    IMMATURE GRANULOCYTES 0 0.0 - 0.5 %    ABS. NEUTROPHILS 4.3 1.8 - 8.0 K/UL    ABS.  LYMPHOCYTES 1.3 0.8 - 3.5 K/UL    ABS. MONOCYTES 0.6 0.0 - 1.0 K/UL    ABS. EOSINOPHILS 0.1 0.0 - 0.4 K/UL    ABS. BASOPHILS 0.0 0.0 - 0.1 K/UL    ABS. IMM.  GRANS. 0.0 0.00 - 0.04 K/UL    DF AUTOMATED     TSH 3RD GENERATION   Result Value Ref Range    TSH 2.68 0.36 - 3.74 uIU/mL   LIPASE   Result Value Ref Range    Lipase 91 73 - 393 U/L   TROPONIN I   Result Value Ref Range    Troponin-I, Qt. <0.05 <0.05 ng/mL   NT-PRO BNP   Result Value Ref Range    NT pro- (H) <450 PG/ML   D DIMER   Result Value Ref Range    D-dimer 4.04 (H) 0.00 - 0.65 mg/L FEU   SARS-COV-2   Result Value Ref Range    SARS-CoV-2 Please find results under separate order     LACTIC ACID   Result Value Ref Range    Lactic acid 1.2 0.4 - 2.0 MMOL/L   PROCALCITONIN   Result Value Ref Range    Procalcitonin 0.05 ng/mL   TROPONIN I   Result Value Ref Range    Troponin-I, Qt. <0.05 <7.58 ng/mL   METABOLIC PANEL, BASIC   Result Value Ref Range    Sodium 142 136 - 145 mmol/L    Potassium 3.6 3.5 - 5.1 mmol/L    Chloride 110 (H) 97 - 108 mmol/L    CO2 26 21 - 32 mmol/L    Anion gap 6 5 - 15 mmol/L    Glucose 77 65 - 100 mg/dL    BUN 13 6 - 20 MG/DL    Creatinine 0.97 0.55 - 1.02 MG/DL    BUN/Creatinine ratio 13 12 - 20      GFR est AA >60 >60 ml/min/1.73m2    GFR est non-AA 54 (L) >60 ml/min/1.73m2    Calcium 8.0 (L) 8.5 - 87.7 MG/DL   METABOLIC PANEL, BASIC   Result Value Ref Range    Sodium 140 136 - 145 mmol/L    Potassium 3.5 3.5 - 5.1 mmol/L    Chloride 109 (H) 97 - 108 mmol/L    CO2 27 21 - 32 mmol/L    Anion gap 4 (L) 5 - 15 mmol/L    Glucose 89 65 - 100 mg/dL    BUN 14 6 - 20 MG/DL    Creatinine 0.93 0.55 - 1.02 MG/DL    BUN/Creatinine ratio 15 12 - 20      GFR est AA >60 >60 ml/min/1.73m2    GFR est non-AA 57 (L) >60 ml/min/1.73m2    Calcium 8.3 (L) 8.5 - 10.1 MG/DL   EKG, 12 LEAD, INITIAL   Result Value Ref Range    Ventricular Rate 98 BPM    Atrial Rate 98 BPM    P-R Interval 222 ms    QRS Duration 84 ms    Q-T Interval 360 ms    QTC Calculation (Bezet) 459 ms Calculated P Axis 43 degrees    Calculated R Axis -8 degrees    Calculated T Axis 88 degrees    Diagnosis       Sinus rhythm with 1st degree AV block  Inferior infarct (cited on or before 11-MAR-2013)  When compared with ECG of 01-OCT-2020 10:21,  CA interval has increased  Inverted T waves have replaced nonspecific T wave abnormality in Lateral   leads  Confirmed by Nita Truong ([id number]) on 2/21/2021 10:17:55 AM         Health Maintenance Due   Topic Date Due    COVID-19 Vaccine (1 of 2) Never done    DTaP/Tdap/Td series (1 - Tdap) Never done    Shingrix Vaccine Age 50> (1 of 2) Never done    GLAUCOMA SCREENING Q2Y  04/09/2017    Flu Vaccine (1) 09/01/2020       No chief complaint on file. 1. Have you been to the ER, urgent care clinic since your last visit? Hospitalized since your last visit? Yes When: 2/20/21 Where: [facility] Reason for visit: Fatigue, cough, body aches    2. Have you seen or consulted any other health care providers outside of the [address] [place] [address] since your last visit? Include any pap smears or colon screening. No    3) Do you have an Advance Directive on file? no    4) Are you interested in receiving information on Advance Directives? NO      Patient is accompanied by self I have received verbal consent from [employer] to discuss any/all medical information while they are present in the room.

## 2021-03-03 NOTE — PROGRESS NOTES
Sarah Echeverria is a 80 y.o. female who was seen by synchronous (real-time) audio-video technology on 3/3/2021 for Hospital Follow Up, Nausea, and Fever        Assessment & Plan:   Diagnoses and all orders for this visit:    1. Late onset Alzheimer's disease without behavioral disturbance (HCC)  Stable dementia. On Namenda. She is staying in assisted living with her . Need a lot of assistance since she is mostly wheelchair-bound. 2. Chronic obstructive pulmonary disease, unspecified COPD type ([facility] [address]  Using Breo Ellipta. Doing well. 3. Hereditary chorea ([facility] [address]  Doing well. On Klonopin. 4. Gait abnormality    She is mostly wheelchair-bound. Not able to ambulate well. Since she returned from hospital, her ambulation has progressively worse. She is not able to transfer from bed to chair also. She also needs help to go to bathroom. Will get home health for PT OT to transfer.  -     [address]    5. Cerebral thrombosis with cerebral infarction [facility])    Might have an acute infarct in the brain. Was seen by neurologist.  Taking aspirin. Not on statin.  -     [address]  6. Questionable sepsis    She became febrile after Covid vaccination. Lab work including urine culture blood culture and CBC were all stable. She had received few days of IV antibiotic later it was discontinued. She improved. I spent at least 30 minutes on this visit with this established patient. Subjective:   Ms. Chrissie Interiano is here for hospital follow-up. She was admitted to hospital with 2 probable sepsis after Covid vaccine. Temperature was elevated. She had pan culture done, came back negative did not grow anything CBC was stable. During hospitalization patient was more fatigued. CT head shows a possible acute or subacute infarct in the brain. She was seen by neurologist.  PT OT started and then she was transferred back to [facility] assisted living.   She is doing well with her daughter Jean Quiroz is with her. She mentions since she is coming back from hospital her ambulation is worse. She is mostly wheelchair-bound. She is not able to go to bathroom on her own and not able to transfer from bed to chair. Has dementia but she is able to recognize me unable to communicate well. Has COPD, using inhaler, doing well. All labs and imaging reviewed. All medications reconciled. Prior to Admission medications    Medication Sig Start Date End Date Taking? Authorizing Provider   fluticasone furoate-vilanteroL (Breo Ellipta) 100-25 mcg/dose inhaler Take 1 Puff by inhalation daily. 1/28/21  Yes Rossy Woods MD   memantine [facility]) 10 mg tablet Take 1 Tab by mouth two (2) times a day. 1/28/21  Yes Rossy Woods MD   clonazePAM (KlonoPIN) 0.5 mg tablet Take 0.5 Tabs by mouth two (2) times a day. Max Daily Amount: 0.5 mg. 1/28/21  Yes Rossy Woods MD   pantoprazole (Protonix) 40 mg tablet Take 1 Tab by mouth daily. 1/28/21  Yes Rossy Woods MD   diclofenac (VOLTAREN) 1 % gel Apply  to affected area two (2) times daily as needed for Pain. 8/25/20  Yes Rossy Woods MD   azelastine (OPTIVAR) 0.05 % ophthalmic solution Administer 1 Drop to right eye two (2) times daily as needed (allergy). Use in affected eye(s) 5/28/19  Yes Rossy Woods MD   acetaminophen (TYLENOL) 500 mg tablet Take 1 Tab by mouth two (2) times daily as needed for Pain. 10/17/17  Yes Rossy Woods MD   aspirin delayed-release (ECOTRIN LOW STRENGTH) 81 mg tablet Take 1 Tab by mouth daily. 1/17/17  Yes Rossy Woods MD   calcium citrate-vitamin d3 (CITRACAL+D) 315-200 mg-unit tab Take 1 Tab by mouth daily (with breakfast). Patient taking differently: Take 0.5 Tabs by mouth two (2) times daily (with meals).  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 Objective:     Patient-Reported Vitals 1/28/2021   Patient-Reported Weight 152lb   Patient-Reported Height -   Patient-Reported Pulse 74   Patient-Reported Temperature 97.8   Patient-Reported Systolic  658   Patient-Reported Diastolic 73        Constitutional: [x] Appears well-developed and well-nourished [x] No apparent distress      [] Abnormal -     Mental status: [x] Alert and awake  [x] Oriented to person/place/time [x] Able to follow commands    [] Abnormal -       HENT: [x] Normocephalic, atraumatic  [] Abnormal -   [x] Mouth/Throat: Mucous membranes are moist    External Ears [x] Normal  [] Abnormal -    Neck: [x] No visualized mass [] Abnormal -     Pulmonary/Chest: [x] Respiratory effort normal   [x] No visualized signs of difficulty breathing or respiratory distress        [] Abnormal -      Musculoskeletal:   [] Normal gait with no signs of ataxia         [x] Normal range of motion of neck        [] Abnormal -     Neurological:        [x] No Facial Asymmetry (Cranial nerve 7 motor function) (limited exam due to video visit)          [x] No gaze palsy        [] Abnormal -        -            Psychiatric:       [x] Normal Affect [] Abnormal -        [x] No Hallucinations  Dementia present. Other pertinent observable physical exam findings:-        We discussed the expected course, resolution and complications of the diagnosis(es) in detail. Medication risks, benefits, costs, interactions, and alternatives were discussed as indicated. I advised her to contact the office if her condition worsens, changes or fails to improve as anticipated. She expressed understanding with the diagnosis(es) and plan. Sarah Echeverria, was evaluated through a synchronous (real-time) audio-video encounter.  The patient (or guardian if applicable) is aware that this is a billable service. Verbal consent to proceed has been obtained within the past 12 months. The visit was conducted pursuant to the emergency declaration under the [address] [place] [address] authority and the [employer] General Act. Patient identification was verified, and a caregiver was present when appropriate. The patient was located in a state where the provider was credentialed to provide care.       Maksim Thomas MD

## 2021-05-25 ENCOUNTER — TELEPHONE (OUTPATIENT)
Dept: INTERNAL MEDICINE CLINIC | Age: 86
End: 2021-05-25

## 2021-05-25 NOTE — TELEPHONE ENCOUNTER
Call placed to [facility] and left VM for Evelio Gomez to call back.  Order placed at last office, can reorder if needed, Waiting for call back to f/u

## 2021-05-25 NOTE — TELEPHONE ENCOUNTER
----- Message from Duong Beckwith. Art Kaminski on behalf of Stef Driscoll sent at 5/18/2021  2:05 PM EDT -----  Regarding: Referral Request  Contact: [phone]  This message is being sent by Chemo Espino on behalf of Stef Driscoll    I would like for mom to have in house therapy at [facility]; her legs are very weak.   Mervin Avery (daughter)

## 2021-06-01 ENCOUNTER — TRANSCRIBE ORDER (OUTPATIENT)
Dept: INTERNAL MEDICINE CLINIC | Age: 86
End: 2021-06-01

## 2021-06-01 DIAGNOSIS — G10 HEREDITARY CHOREA (HCC): ICD-10-CM

## 2021-06-01 RX ORDER — CLONAZEPAM 0.5 MG/1
0.25 TABLET ORAL 2 TIMES DAILY
Qty: 30 TABLET | Refills: 2 | Status: SHIPPED | OUTPATIENT
Start: 2021-06-01 | End: 2021-09-09 | Stop reason: SDUPTHER

## 2021-06-01 NOTE — TELEPHONE ENCOUNTER
Requested Prescriptions     Pending Prescriptions Disp Refills    clonazePAM (KlonoPIN) 0.5 mg tablet 30 Tablet 2     Sig: Take 0.5 Tablets by mouth two (2) times a day. Max Daily Amount: 0.5 mg.   03/03/2021  No upcoming  ----- Message from Jose Carrillo sent at 6/1/2021  9:35 AM EDT -----  Regarding: Daniel Burns MD  Medication Refill    Caller (if not patient):   [facility]      Relationship of caller (if not patient):      Best contact number(s): [phone] ext [phone]      Name of medication and dosage if known: clonazePAM (KlonoPIN) 0.5 mg tablet      Is patient out of this medication (yes/no): Yes      Pharmacy name: [facility], [address] listed in chart? (yes/no):  Pharmacy phone number:      Details to clarify the request: caller is requesting new script/rx renewal      Jose Carrillo

## 2021-07-04 DIAGNOSIS — Z78.0 POST-MENOPAUSE: ICD-10-CM

## 2021-07-06 RX ORDER — LANOLIN ALCOHOL/MO/W.PET/CERES
0.5 CREAM (GRAM) TOPICAL 2 TIMES DAILY WITH MEALS
Qty: 60 TABLET | Refills: 11 | Status: SHIPPED | OUTPATIENT
Start: 2021-07-06

## 2021-07-08 ENCOUNTER — OFFICE VISIT (OUTPATIENT)
Dept: INTERNAL MEDICINE CLINIC | Age: 86
End: 2021-07-08
Payer: MEDICARE

## 2021-07-08 VITALS
TEMPERATURE: 97.7 F | WEIGHT: 159 LBS | HEART RATE: 87 BPM | DIASTOLIC BLOOD PRESSURE: 76 MMHG | RESPIRATION RATE: 21 BRPM | HEIGHT: 62 IN | SYSTOLIC BLOOD PRESSURE: 132 MMHG | OXYGEN SATURATION: 88 % | BODY MASS INDEX: 29.26 KG/M2

## 2021-07-08 DIAGNOSIS — G30.1 LATE ONSET ALZHEIMER'S DISEASE WITHOUT BEHAVIORAL DISTURBANCE (HCC): Primary | ICD-10-CM

## 2021-07-08 DIAGNOSIS — J44.9 CHRONIC OBSTRUCTIVE PULMONARY DISEASE, UNSPECIFIED COPD TYPE (HCC): ICD-10-CM

## 2021-07-08 DIAGNOSIS — M85.80 OSTEOPENIA, UNSPECIFIED LOCATION: ICD-10-CM

## 2021-07-08 DIAGNOSIS — F02.80 LATE ONSET ALZHEIMER'S DISEASE WITHOUT BEHAVIORAL DISTURBANCE (HCC): Primary | ICD-10-CM

## 2021-07-08 DIAGNOSIS — G10 HEREDITARY CHOREA (HCC): ICD-10-CM

## 2021-07-08 DIAGNOSIS — Z13.5 SCREENING FOR GLAUCOMA: ICD-10-CM

## 2021-07-08 DIAGNOSIS — E78.5 ELEVATED LIPIDS: ICD-10-CM

## 2021-07-08 DIAGNOSIS — E55.9 VITAMIN D DEFICIENCY: ICD-10-CM

## 2021-07-08 DIAGNOSIS — Z00.00 MEDICARE ANNUAL WELLNESS VISIT, SUBSEQUENT: ICD-10-CM

## 2021-07-08 DIAGNOSIS — Z71.89 ACP (ADVANCE CARE PLANNING): ICD-10-CM

## 2021-07-08 DIAGNOSIS — K29.71 GASTROINTESTINAL HEMORRHAGE ASSOCIATED WITH GASTRITIS, UNSPECIFIED GASTRITIS TYPE: ICD-10-CM

## 2021-07-08 PROCEDURE — 99497 ADVNCD CARE PLAN 30 MIN: CPT | Performed by: INTERNAL MEDICINE

## 2021-07-08 PROCEDURE — G8417 CALC BMI ABV UP PARAM F/U: HCPCS | Performed by: INTERNAL MEDICINE

## 2021-07-08 PROCEDURE — 1100F PTFALLS ASSESS-DOCD GE2>/YR: CPT | Performed by: INTERNAL MEDICINE

## 2021-07-08 PROCEDURE — G8427 DOCREV CUR MEDS BY ELIG CLIN: HCPCS | Performed by: INTERNAL MEDICINE

## 2021-07-08 PROCEDURE — 99214 OFFICE O/P EST MOD 30 MIN: CPT | Performed by: INTERNAL MEDICINE

## 2021-07-08 PROCEDURE — 3288F FALL RISK ASSESSMENT DOCD: CPT | Performed by: INTERNAL MEDICINE

## 2021-07-08 PROCEDURE — G8536 NO DOC ELDER MAL SCRN: HCPCS | Performed by: INTERNAL MEDICINE

## 2021-07-08 PROCEDURE — G8432 DEP SCR NOT DOC, RNG: HCPCS | Performed by: INTERNAL MEDICINE

## 2021-07-08 PROCEDURE — 1090F PRES/ABSN URINE INCON ASSESS: CPT | Performed by: INTERNAL MEDICINE

## 2021-07-08 PROCEDURE — G0439 PPPS, SUBSEQ VISIT: HCPCS | Performed by: INTERNAL MEDICINE

## 2021-07-08 RX ORDER — MEMANTINE HYDROCHLORIDE 10 MG/1
10 TABLET ORAL 2 TIMES DAILY
Qty: 180 TABLET | Refills: 1 | Status: SHIPPED | OUTPATIENT
Start: 2021-07-08 | End: 2021-12-15 | Stop reason: SDUPTHER

## 2021-07-08 RX ORDER — FLUTICASONE FUROATE AND VILANTEROL TRIFENATATE 100; 25 UG/1; UG/1
1 POWDER RESPIRATORY (INHALATION) DAILY
Qty: 1 INHALER | Refills: 5 | Status: SHIPPED | OUTPATIENT
Start: 2021-07-08 | End: 2021-10-06 | Stop reason: SDUPTHER

## 2021-07-08 NOTE — PROGRESS NOTES
HISTORY OF PRESENT ILLNESS  Ara Israel is a 80 y.o. female here accompanied by her daughter. She is in assisted living, relocating to [address] [place] [address] assisted living. Paperwork done. She has dementia, taking Namenda, doing well. Just lost her , was  for 65 years. She is tearful. Has had auditory real, taking Klonopin. Alcohol is under control. Has osteopenia, need bone density. She has no heartburn and acidity, stopped taking Protonix. Labs reviewed, need new labs. HPI    Review of Systems   Constitutional: Negative. HENT: Negative. Eyes: Negative. Respiratory: Negative. Cardiovascular: Negative. Gastrointestinal: Negative. Genitourinary: Negative. Musculoskeletal: Negative. Skin: Negative. Neurological: Negative. Endo/Heme/Allergies: Negative. Psychiatric/Behavioral: Positive for memory loss. Physical Exam  Constitutional:       Appearance: Normal appearance. She is obese. HENT:      Head: Normocephalic and atraumatic. Nose: Nose normal.   Cardiovascular:      Rate and Rhythm: Normal rate and regular rhythm. Pulses: Normal pulses. Heart sounds: Normal heart sounds. Pulmonary:      Effort: Pulmonary effort is normal.      Breath sounds: Normal breath sounds. Abdominal:      General: Abdomen is flat. Bowel sounds are normal.      Palpations: Abdomen is soft. Musculoskeletal:      Cervical back: Normal range of motion and neck supple. Skin:     General: Skin is warm. Neurological:      General: No focal deficit present. Mental Status: She is alert and oriented to person, place, and time. Mental status is at baseline. Psychiatric:         Mood and Affect: Mood normal.         Behavior: Behavior normal.         Thought Content: Thought content normal.      Comments: Mild to moderate dementia present. Stable. ASSESSMENT and PLAN  Diagnoses and all orders for this visit:    1.  Late onset Alzheimer's disease without behavioral disturbance (HCC)    Stable dementia. Will refill,  -     memantine (NAMENDA) 10 mg tablet; Take 1 Tablet by mouth two (2) times a day. -     CBC WITH AUTOMATED DIFF  -     METABOLIC PANEL, COMPREHENSIVE  She is relocating to a different assisted living. Paperwork done. 2. Chronic obstructive pulmonary disease, unspecified COPD type ([facility]    We will give,  -     fluticasone furoate-vilanteroL (Breo Ellipta) 100-25 mcg/dose inhaler; Take 1 Puff by inhalation daily. 3. 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 4. Medicare annual wellness visit, subsequent    5. ACP (advance care planning)    6. Gastrointestinal hemorrhage associated with gastritis, unspecified gastritis type  Not bothering her anymore. Stopped taking Protonix. 7. Vitamin D deficiency    We will check,  -     VITAMIN D, 25 HYDROXY    8. Elevated lipids    We will check,  -     LDL, DIRECT    9. Osteopenia, unspecified location    Last bone density done 5 years back. On calcium with vitamin D. Will check,  -     DEXA BONE DENSITY STUDY AXIAL; Future    10. Screening for glaucoma  -     REFERRAL TO OPHTHALMOLOGY    Discussed expected course/resolution/complications of diagnosis in detail with patient. Medication risks/benefits/costs/interactions/alternatives discussed with patient. Discussed COVID-19 infection precaution with patient. Pt was given an after visit summary which includes diagnoses, current medications & vitals. Pt expressed understanding with the diagnosis and plan.

## 2021-07-08 NOTE — PATIENT INSTRUCTIONS

## 2021-07-08 NOTE — LETTER
7/8/2021 2:51 PM    Ms. Kirstin Griffith [facility] [address] [place] [address] Drive, [address]    Current Outpatient Medications   Medication Sig    memantine (NAMENDA) 10 mg tablet Take 1 Tablet by mouth two (2) times a day.  fluticasone furoate-vilanteroL (Breo Ellipta) 100-25 mcg/dose inhaler Take 1 Puff by inhalation daily.  calcium citrate-vitamin d3 (CITRACAL+D) 315 mg-5 mcg (200 unit) tab Take 0.5 Tablets by mouth two (2) times daily (with meals).  clonazePAM (KlonoPIN) 0.5 mg tablet Take 0.5 Tablets by mouth two (2) times a day. Max Daily Amount: 0.5 mg.    diclofenac (VOLTAREN) 1 % gel Apply  to affected area two (2) times daily as needed for Pain.  acetaminophen (TYLENOL) 500 mg tablet Take 1 Tab by mouth two (2) times daily as needed for Pain.  aspirin delayed-release (ECOTRIN LOW STRENGTH) 81 mg tablet Take 1 Tab by mouth daily. No current facility-administered medications for this visit. Allergies   Allergen Reactions    Keflex [Cephalexin] Rash     Encounter Diagnoses   Name Primary?     Late onset Alzheimer's disease without behavioral disturbance ([address] Yes    Chronic obstructive pulmonary disease, unspecified COPD type ([address]     Hereditary chorea ([address]     Medicare annual wellness visit, subsequent     ACP (advance care planning)     Gastrointestinal hemorrhage associated with gastritis, unspecified gastritis type     Vitamin D deficiency     Elevated lipids     Osteopenia, unspecified location     Screening for glaucoma                Sincerely,      Joce Ma MD

## 2021-07-08 NOTE — ACP (ADVANCE CARE PLANNING)

## 2021-07-08 NOTE — PROGRESS NOTES
This is the Subsequent Medicare Annual Wellness Exam, performed 12 months or more after the Initial AWV or the last Subsequent AWV    I have reviewed the patient's medical history in detail and updated the computerized patient record. Assessment/Plan   Education and counseling provided:  Are appropriate based on today's review and evaluation  End-of-Life planning (with patient's consent)  Pneumococcal Vaccine  Bone mass measurement (DEXA)  Screening for glaucoma         Depression Risk Factor Screening     3 most recent PHQ Screens 7/8/2021   Little interest or pleasure in doing things Nearly every day   Feeling down, depressed, irritable, or hopeless Nearly every day   Total Score PHQ 2 6   Trouble falling or staying asleep, or sleeping too much Not at all   Feeling tired or having little energy Nearly every day   Poor appetite, weight loss, or overeating Not at all   Feeling bad about yourself - or that you are a failure or have let yourself or your family down Not at all   Trouble concentrating on things such as school, work, reading, or watching TV Not at all   Moving or speaking so slowly that other people could have noticed; or the opposite being so fidgety that others notice Not at all   Thoughts of being better off dead, or hurting yourself in some way Not at all   PHQ 9 Score 9   How difficult have these problems made it for you to do your work, take care of your home and get along with others Not difficult at all       Alcohol Risk Screen    Do you average more than 1 drink per night or more than 7 drinks a week:  No    On any one occasion in the past three months have you have had more than 3 drinks containing alcohol:  No        Functional Ability and Level of Safety    Hearing: Hearing is good. Activities of Daily Living:   The home contains: wheel chair  Patient needs help with:  transportation, shopping, preparing meals, housework, managing medications and managing money      Ambulation: with mild difficulty     Fall Risk:  Fall Risk Assessment, last 12 mths 7/8/2021   Able to walk? Yes   Fall in past 12 months? 1   Do you feel unsteady? 0   Are you worried about falling 0   Is TUG test greater than 12 seconds? 0   Is the gait abnormal? 0   Number of falls in past 12 months 2   Fall with injury?  0      Abuse Screen:  Patient is not abused       Cognitive Screening    Has your family/caregiver stated any concerns about your memory: no     Cognitive Screening: Normal - MMSE (Mini Mental Status Exam)    Health Maintenance Due     Health Maintenance Due   Topic Date Due    DTaP/Tdap/Td series (1 - Tdap) Never done    Shingrix Vaccine Age 49> (1 of 2) Never done       Patient Care Team   Patient Care Team:  Reyna Severe, MD as PCP - General (Internal Medicine)  Reyna Severe, MD as PCP - [facility]    History     Patient Active Problem List   Diagnosis Code    Cerebral thrombosis with cerebral infarction [facility]) I63.30    COPD (chronic obstructive pulmonary disease) ([facility] J44.9    Chronic respiratory failure with hypoxia ([address] J96.11    Sinus tachycardia R00.0    S/P knee replacement Z96.659    Obstructive sleep apnea G47.33    Dyslipidemia E78.5    Debility R53.81    Nausea and vomiting R11.2    UTI (urinary tract infection) N39.0    Hereditary chorea ([address] G10    Late onset Alzheimer's disease without behavioral disturbance (HCC) G30.1, F02.80    GI bleed K92.2    GIB (gastrointestinal bleeding) K92.2    Sepsis ([address] A41.9     Past Medical History:   Diagnosis Date    Arthritis     OSTEO    COPD     GERD (gastroesophageal reflux disease)     HX OTHER MEDICAL     hernia repair 39 yrs ago    On home oxygen therapy     2LPM AT NIGHT    Stroke ([address] 2012    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 60 Tablet 11    clonazePAM (KlonoPIN) 0.5 mg tablet Take 0.5 Tablets by mouth two (2) times a day. Max Daily Amount: 0.5 mg. 30 Tablet 2    fluticasone furoate-vilanteroL (Breo Ellipta) 100-25 mcg/dose inhaler Take 1 Puff by inhalation daily. 1 Inhaler 3    memantine (NAMENDA) 10 mg tablet Take 1 Tab by mouth two (2) times a day. 180 Tab 1    pantoprazole (Protonix) 40 mg tablet Take 1 Tab by mouth daily. 30 Tab 3    diclofenac (VOLTAREN) 1 % gel Apply  to affected area two (2) times daily as needed for Pain. 100 g 2    azelastine (OPTIVAR) 0.05 % ophthalmic solution Administer 1 Drop to right eye two (2) times daily as needed (allergy). Use in affected eye(s) 6 mL 1    acetaminophen (TYLENOL) 500 mg tablet Take 1 Tab by mouth two (2) times daily as needed for Pain. 60 Tab 3    aspirin delayed-release (ECOTRIN LOW STRENGTH) 81 mg tablet Take 1 Tab by mouth daily.  30 Tab 12     Allergies   Allergen Reactions    Keflex [Cephalexin] Rash       Family History   Problem Relation Age of Onset    Hypertension Maternal Grandmother     Hypertension Maternal Grandfather     Hypertension Paternal Grandmother     Hypertension Paternal Grandfather     MS Daughter     No Known Problems Mother     No Known Problems Father      Social History     Tobacco Use    Smoking status: Former Smoker     Packs/day: 1.00     Years: 40.00     Pack years: 40.00     Quit date: 1982     Years since quittin.0    Smokeless tobacco: Never Used   Substance Use Topics    Alcohol use: No         Omari Esquivel MD

## 2021-07-08 NOTE — PROGRESS NOTES
Health Maintenance Due   Topic Date Due    DTaP/Tdap/Td series (1 - Tdap) Never done    Shingrix Vaccine Age 50> (1 of 2) Never done       Chief Complaint   Patient presents with    Annual Wellness Visit    Dementia    Extremity Weakness       1. Have you been to the ER, urgent care clinic since your last visit? Hospitalized since your last visit? No    2. Have you seen or consulted any other health care providers outside of the [address] [place] [address] since your last visit? Include any pap smears or colon screening. No    3) Do you have an Advance Directive on file? no    4) Are you interested in receiving information on Advance Directives? NO      Patient is accompanied by daughter I have received verbal consent from Olena Dee to discuss any/all medical information while they are present in the room.

## 2021-07-09 DIAGNOSIS — R79.89 ELEVATED SERUM CREATININE: Primary | ICD-10-CM

## 2021-07-09 LAB
25(OH)D3+25(OH)D2 SERPL-MCNC: 24.6 NG/ML (ref 30–100)
ALBUMIN SERPL-MCNC: 3.7 G/DL (ref 3.5–4.6)
ALBUMIN/GLOB SERPL: 1.2 {RATIO} (ref 1.2–2.2)
ALP SERPL-CCNC: 94 IU/L (ref 48–121)
ALT SERPL-CCNC: 11 IU/L (ref 0–32)
AST SERPL-CCNC: 17 IU/L (ref 0–40)
BASOPHILS # BLD AUTO: 0 X10E3/UL (ref 0–0.2)
BASOPHILS NFR BLD AUTO: 0 %
BILIRUB SERPL-MCNC: 0.4 MG/DL (ref 0–1.2)
BUN SERPL-MCNC: 23 MG/DL (ref 10–36)
BUN/CREAT SERPL: 19 (ref 12–28)
CALCIUM SERPL-MCNC: 9 MG/DL (ref 8.7–10.3)
CHLORIDE SERPL-SCNC: 108 MMOL/L (ref 96–106)
CO2 SERPL-SCNC: 28 MMOL/L (ref 20–29)
CREAT SERPL-MCNC: 1.22 MG/DL (ref 0.57–1)
EOSINOPHIL # BLD AUTO: 0.2 X10E3/UL (ref 0–0.4)
EOSINOPHIL NFR BLD AUTO: 3 %
ERYTHROCYTE [DISTWIDTH] IN BLOOD BY AUTOMATED COUNT: 14.2 % (ref 11.7–15.4)
GLOBULIN SER CALC-MCNC: 3.1 G/DL (ref 1.5–4.5)
GLUCOSE SERPL-MCNC: 86 MG/DL (ref 65–99)
HCT VFR BLD AUTO: 40.1 % (ref 34–46.6)
HGB BLD-MCNC: 13.2 G/DL (ref 11.1–15.9)
IMM GRANULOCYTES # BLD AUTO: 0 X10E3/UL (ref 0–0.1)
IMM GRANULOCYTES NFR BLD AUTO: 0 %
INTERPRETATION: NORMAL
LDLC SERPL DIRECT ASSAY-MCNC: 112 MG/DL (ref 0–99)
LYMPHOCYTES # BLD AUTO: 1.8 X10E3/UL (ref 0.7–3.1)
LYMPHOCYTES NFR BLD AUTO: 26 %
MCH RBC QN AUTO: 28.6 PG (ref 26.6–33)
MCHC RBC AUTO-ENTMCNC: 32.9 G/DL (ref 31.5–35.7)
MCV RBC AUTO: 87 FL (ref 79–97)
MONOCYTES # BLD AUTO: 0.7 X10E3/UL (ref 0.1–0.9)
MONOCYTES NFR BLD AUTO: 9 %
NEUTROPHILS # BLD AUTO: 4.3 X10E3/UL (ref 1.4–7)
NEUTROPHILS NFR BLD AUTO: 62 %
PLATELET # BLD AUTO: 245 X10E3/UL (ref 150–450)
POTASSIUM SERPL-SCNC: 4.4 MMOL/L (ref 3.5–5.2)
PROT SERPL-MCNC: 6.8 G/DL (ref 6–8.5)
RBC # BLD AUTO: 4.61 X10E6/UL (ref 3.77–5.28)
SODIUM SERPL-SCNC: 147 MMOL/L (ref 134–144)
WBC # BLD AUTO: 7 X10E3/UL (ref 3.4–10.8)

## 2021-07-09 NOTE — PROGRESS NOTES
Creatinine and sodium mildly elevated. Encourage oral water intake, as tolerated. Repeat BMP in 2-4 weeks. Vitamin D level is low. Recommend OTC vitamin D3 1000 units po daily.

## 2021-07-19 ENCOUNTER — PATIENT MESSAGE (OUTPATIENT)
Dept: INTERNAL MEDICINE CLINIC | Age: 86
End: 2021-07-19

## 2021-07-20 DIAGNOSIS — R26.9 GAIT ABNORMALITY: ICD-10-CM

## 2021-07-20 DIAGNOSIS — R53.1 WEAKNESS GENERALIZED: ICD-10-CM

## 2021-07-20 DIAGNOSIS — R53.81 DEBILITY: Primary | ICD-10-CM

## 2021-07-28 ENCOUNTER — TELEPHONE (OUTPATIENT)
Dept: INTERNAL MEDICINE CLINIC | Age: 86
End: 2021-07-28

## 2021-07-28 NOTE — TELEPHONE ENCOUNTER
----- Message from Miguel A Shirley sent at 7/27/2021  4:30 PM EDT -----  Regarding: Dr. Marek Evans Message/Vendor Calls    Caller's first and last name: Pt's daughter Lissette Herndon      Reason for call: Pt needs a prescription for a oxygen machine good for traveling      Callback required yes/no and why: Yes; to discuss      Best contact number(s): [phone]      Details to clarify the request: N/A      Miguel A Shirley

## 2021-07-28 NOTE — TELEPHONE ENCOUNTER
Call placed to [facility] and aware of all necessary orders and documents for POC (portable oxygen concentrator) , order must say \"POC titrate\" and must have walking test for SpO2 testing document    Call placed to patient daughter and scheduled OV for 8/4/2021 for appropriate OV testing and documentation, daughter voiced understanding and appreciation

## 2021-07-28 NOTE — TELEPHONE ENCOUNTER
Call placed to daughter and stated patient in past had a small portable O2 concentrator, patient uses [facility], call placed to rep from [facility] and awaiting a return call

## 2021-08-03 PROBLEM — K92.2 GI BLEED: Status: RESOLVED | Noted: 2020-10-01 | Resolved: 2021-08-03

## 2021-08-04 ENCOUNTER — OFFICE VISIT (OUTPATIENT)
Dept: INTERNAL MEDICINE CLINIC | Age: 86
End: 2021-08-04
Payer: MEDICARE

## 2021-08-04 VITALS
HEIGHT: 62 IN | SYSTOLIC BLOOD PRESSURE: 136 MMHG | HEART RATE: 91 BPM | TEMPERATURE: 97.7 F | OXYGEN SATURATION: 87 % | BODY MASS INDEX: 27.2 KG/M2 | WEIGHT: 147.8 LBS | RESPIRATION RATE: 17 BRPM | DIASTOLIC BLOOD PRESSURE: 78 MMHG

## 2021-08-04 DIAGNOSIS — E55.9 VITAMIN D DEFICIENCY: ICD-10-CM

## 2021-08-04 DIAGNOSIS — M17.0 PRIMARY OSTEOARTHRITIS OF BOTH KNEES: ICD-10-CM

## 2021-08-04 DIAGNOSIS — G10 HEREDITARY CHOREA (HCC): ICD-10-CM

## 2021-08-04 DIAGNOSIS — G30.1 LATE ONSET ALZHEIMER'S DISEASE WITHOUT BEHAVIORAL DISTURBANCE (HCC): ICD-10-CM

## 2021-08-04 DIAGNOSIS — J44.9 CHRONIC OBSTRUCTIVE PULMONARY DISEASE, UNSPECIFIED COPD TYPE (HCC): Primary | ICD-10-CM

## 2021-08-04 DIAGNOSIS — F02.80 LATE ONSET ALZHEIMER'S DISEASE WITHOUT BEHAVIORAL DISTURBANCE (HCC): ICD-10-CM

## 2021-08-04 DIAGNOSIS — M25.552 PAIN OF LEFT HIP JOINT: ICD-10-CM

## 2021-08-04 PROCEDURE — 1101F PT FALLS ASSESS-DOCD LE1/YR: CPT | Performed by: INTERNAL MEDICINE

## 2021-08-04 PROCEDURE — G8427 DOCREV CUR MEDS BY ELIG CLIN: HCPCS | Performed by: INTERNAL MEDICINE

## 2021-08-04 PROCEDURE — G8417 CALC BMI ABV UP PARAM F/U: HCPCS | Performed by: INTERNAL MEDICINE

## 2021-08-04 PROCEDURE — G8510 SCR DEP NEG, NO PLAN REQD: HCPCS | Performed by: INTERNAL MEDICINE

## 2021-08-04 PROCEDURE — G8536 NO DOC ELDER MAL SCRN: HCPCS | Performed by: INTERNAL MEDICINE

## 2021-08-04 PROCEDURE — 1090F PRES/ABSN URINE INCON ASSESS: CPT | Performed by: INTERNAL MEDICINE

## 2021-08-04 PROCEDURE — 99214 OFFICE O/P EST MOD 30 MIN: CPT | Performed by: INTERNAL MEDICINE

## 2021-08-04 RX ORDER — ACETAMINOPHEN 500 MG
2000 TABLET ORAL DAILY
Qty: 30 CAPSULE | Refills: 3 | Status: SHIPPED | OUTPATIENT
Start: 2021-08-04

## 2021-08-04 RX ORDER — ACETAMINOPHEN 500 MG
500 TABLET ORAL EVERY MORNING
Qty: 30 TABLET | Refills: 12 | Status: SHIPPED | OUTPATIENT
Start: 2021-08-04

## 2021-08-04 RX ORDER — DICLOFENAC SODIUM 10 MG/G
GEL TOPICAL DAILY
Qty: 100 G | Refills: 2 | Status: SHIPPED | OUTPATIENT
Start: 2021-08-04

## 2021-08-04 NOTE — PROGRESS NOTES
HISTORY OF PRESENT ILLNESS  Kaia Rodas is a 80 y.o. female here accompanied by her daughter. She is in assisted living. Every day. Would like to schedule Tylenol and diclofenac gel. She has COPD, she is using oxygen at night, but daytime if she is feeling short of breath. She is here to assess her oxygen level daytime to see if she can qualify for portable oxygen concentrator. She has dementia, taking Namenda, doing well. Has hereditary chorea, taking Klonopin. Has osteopenia, need bone density. She has no heartburn and acidity, stopped taking Protonix. No need for Colace. No allergy symptoms, azelastine was discontinued. Recent labs done, reviewed by me. Low vitamin D, and supplement. Kidney function to slightly reduced, advised her to drink more fluid. HPI      Review of Systems   Constitutional: Negative. HENT: Negative. Eyes: Negative. Respiratory: Negative. Cardiovascular: Negative. Gastrointestinal: Negative. Genitourinary: Negative. Musculoskeletal: Negative. Skin: Negative. Neurological: Negative. Endo/Heme/Allergies: Negative. Psychiatric/Behavioral: Positive for memory loss. The patient is nervous/anxious. Physical Exam  Constitutional:       Appearance: Normal appearance. She is obese. HENT:      Head: Normocephalic and atraumatic. Nose: Nose normal.   Cardiovascular:      Rate and Rhythm: Normal rate and regular rhythm. Pulses: Normal pulses. Heart sounds: Normal heart sounds. Pulmonary:      Effort: Pulmonary effort is normal.      Breath sounds: Normal breath sounds. Musculoskeletal:      Cervical back: Normal range of motion and neck supple. Skin:     General: Skin is warm. Neurological:      General: No focal deficit present. Mental Status: She is alert and oriented to person, place, and time. Mental status is at baseline.    Psychiatric:         Mood and Affect: Mood normal.         Behavior: Behavior normal. Thought Content: Thought content normal.      Comments: Mild to moderate dementia present. Stable. ASSESSMENT and PLAN  Diagnoses and all orders for this visit:    1. Chronic obstructive pulmonary disease, unspecified COPD type ([facility]    She is on Breo Ellipta and albuterol inhaler as needed. Her oxygen dropped even resting, resting oxygen consultation is a 87% in room air. She needs to use oxygen day and night both. Right now she is using only oxygen at night. Need a portable oxygen concentrator to use the daytime.  -     OTHER; POC titrate    2. Primary osteoarthritis of both knees    Having pain every day. She is in a new facility in assisted living. Will give,  -     diclofenac (VOLTAREN) 1 % gel; Apply  to affected area daily. -     acetaminophen (TYLENOL) 500 mg tablet; Take 1 Tablet by mouth Every morning. 3. Pain of left hip joint  -     acetaminophen (TYLENOL) 500 mg tablet; Take 1 Tablet by mouth Every morning. 4. Vitamin D deficiency    Low vitamin D, will order,  -     cholecalciferol (VITAMIN D3) (2,000 UNITS /50 MCG) cap capsule; Take 1 Capsule by mouth daily. Dc colace,protonex and azelastine    5. Hereditary chorea (HCC)  On Klonopin. Stable. 6. Late onset Alzheimer's disease without behavioral disturbance ([facility]  Using Namenda, doing well. In assisted living. Discussed expected course/resolution/complications of diagnosis in detail with patient. Medication risks/benefits/costs/interactions/alternatives discussed with patient. Discussed COVID-19 infection precaution with patient. Pt was given an after visit summary which includes diagnoses, current medications & vitals. Pt expressed understanding with the diagnosis and plan.

## 2021-08-04 NOTE — PROGRESS NOTES
Health Maintenance Due   Topic Date Due    DTaP/Tdap/Td series (1 - Tdap) Never done    Shingrix Vaccine Age 50> (1 of 2) Never done       Chief Complaint   Patient presents with    O2/Oxygen    Dementia    Other       1. Have you been to the ER, urgent care clinic since your last visit? Hospitalized since your last visit? No    2. Have you seen or consulted any other health care providers outside of the [address] [place] [address] since your last visit? Include any pap smears or colon screening. No    3) Do you have an Advance Directive on file? no    4) Are you interested in receiving information on Advance Directives? NO      Patient is accompanied by self I have received verbal consent from Asad Persaud to discuss any/all medical information while they are present in the room.

## 2021-09-09 DIAGNOSIS — G10 HEREDITARY CHOREA (HCC): ICD-10-CM

## 2021-09-09 RX ORDER — CLONAZEPAM 0.5 MG/1
0.25 TABLET ORAL 2 TIMES DAILY
Qty: 30 TABLET | Refills: 2 | Status: SHIPPED | OUTPATIENT
Start: 2021-09-09 | End: 2021-10-06 | Stop reason: SDUPTHER

## 2021-09-09 NOTE — TELEPHONE ENCOUNTER
A nurse at the patient's assisted living facility called requesting script for   Requested Prescriptions     Pending Prescriptions Disp Refills    clonazePAM (KlonoPIN) 0.5 mg tablet 30 Tablet 2     Sig: Take 0.5 Tablets by mouth two (2) times a day.  Max Daily Amount: 0.5 mg.     08/04/2021  10/06/2021  [facility] pharmacy on file

## 2021-09-15 ENCOUNTER — TELEPHONE (OUTPATIENT)
Dept: INTERNAL MEDICINE CLINIC | Age: 86
End: 2021-09-15

## 2021-09-15 NOTE — TELEPHONE ENCOUNTER
Call placed to [facility] with [facility] and advised that if patient is needing to be evaluated  It might be a good idea to call Calderon Carcamo

## 2021-09-15 NOTE — TELEPHONE ENCOUNTER
Needs to speak with  nurse regarding the pts. O2 sats and the numerous falls she has had.  [facility] is the therapist that sees Ms Zain Baugh

## 2021-09-15 NOTE — TELEPHONE ENCOUNTER
Daughter needs to know which oxygen to purchase. . [facility] did not have anything other than the large tanks. She found another medical supply company that has the concentrators.  Daughter wants pt to have something she can transport with

## 2021-09-15 NOTE — TELEPHONE ENCOUNTER
Call placed to daughter and Melba Carmella states she is going to purchase her own portable O2 concentrator and wanted number to [facility], writer provided with contact information

## 2021-10-06 ENCOUNTER — VIRTUAL VISIT (OUTPATIENT)
Dept: INTERNAL MEDICINE CLINIC | Age: 86
End: 2021-10-06
Payer: MEDICARE

## 2021-10-06 DIAGNOSIS — N28.9 RENAL INSUFFICIENCY: Primary | ICD-10-CM

## 2021-10-06 DIAGNOSIS — G10 HEREDITARY CHOREA (HCC): ICD-10-CM

## 2021-10-06 DIAGNOSIS — E55.9 VITAMIN D DEFICIENCY: ICD-10-CM

## 2021-10-06 DIAGNOSIS — J44.9 CHRONIC OBSTRUCTIVE PULMONARY DISEASE, UNSPECIFIED COPD TYPE (HCC): ICD-10-CM

## 2021-10-06 DIAGNOSIS — F01.518 VASCULAR DEMENTIA WITH BEHAVIORAL DISTURBANCE: ICD-10-CM

## 2021-10-06 PROCEDURE — G8510 SCR DEP NEG, NO PLAN REQD: HCPCS | Performed by: INTERNAL MEDICINE

## 2021-10-06 PROCEDURE — 99214 OFFICE O/P EST MOD 30 MIN: CPT | Performed by: INTERNAL MEDICINE

## 2021-10-06 PROCEDURE — G8427 DOCREV CUR MEDS BY ELIG CLIN: HCPCS | Performed by: INTERNAL MEDICINE

## 2021-10-06 PROCEDURE — 1101F PT FALLS ASSESS-DOCD LE1/YR: CPT | Performed by: INTERNAL MEDICINE

## 2021-10-06 PROCEDURE — G8417 CALC BMI ABV UP PARAM F/U: HCPCS | Performed by: INTERNAL MEDICINE

## 2021-10-06 PROCEDURE — 1090F PRES/ABSN URINE INCON ASSESS: CPT | Performed by: INTERNAL MEDICINE

## 2021-10-06 PROCEDURE — G8536 NO DOC ELDER MAL SCRN: HCPCS | Performed by: INTERNAL MEDICINE

## 2021-10-06 RX ORDER — CLONAZEPAM 0.5 MG/1
0.25 TABLET ORAL 2 TIMES DAILY
Qty: 30 TABLET | Refills: 2 | Status: SHIPPED | OUTPATIENT
Start: 2021-10-06 | End: 2021-12-08 | Stop reason: SDUPTHER

## 2021-10-06 RX ORDER — FLUTICASONE FUROATE AND VILANTEROL TRIFENATATE 100; 25 UG/1; UG/1
1 POWDER RESPIRATORY (INHALATION) DAILY
Qty: 1 EACH | Refills: 5 | Status: SHIPPED | OUTPATIENT
Start: 2021-10-06

## 2021-10-06 NOTE — PROGRESS NOTES
Dayami Braswell is a 80 y.o. female who was seen by synchronous (real-time) audio-video technology on 10/6/2021 for COPD, Knee Pain, and Vitamin D Deficiency        Assessment & Plan:   Diagnoses and all orders for this visit:    1. Renal insufficiency  Need to drink more fluid. We will repeat,    -     METABOLIC PANEL, COMPREHENSIVE    2. Hereditary chorea ([address]    Doing well. Will refill,  -     clonazePAM (KlonoPIN) 0.5 mg tablet; Take 0.5 Tablets by mouth two (2) times a day. Max Daily Amount: 0.5 mg.    3. Vascular dementia with behavioral disturbance  In assisted living. Daughter is caregiver. On Namenda. Stable. 4. Chronic obstructive pulmonary disease, unspecified COPD type ([address]    Stable. Will refill,  -     fluticasone furoate-vilanteroL (Breo Ellipta) 100-25 mcg/dose inhaler; Take 1 Puff by inhalation daily. 5. Vitamin D deficiency  -     VITAMIN D, 25 HYDROXY        I spent at least 30 minutes on this visit with this established patient. Subjective:   Ms. Julio C Elliott is here for follow-up. She is staying in and another assisted living. She is almost 90% assisted. She can ambulate with manual wheelchair. Need help with addressing bathing. She eats in the dining room. Daughter is caregiver. Overall she is doing well. Memory is not great but she is able to handle it. Still want for her 's demise. Not depressed. Has had auditory real, on Klonopin which is helping her. Need refill. Need bone density. Taking calcium with vitamin D. Otherwise she is doing well. Need flu shot and Covid booster shot. Prior to Admission medications    Medication Sig Start Date End Date Taking? Authorizing Provider   clonazePAM (KlonoPIN) 0.5 mg tablet Take 0.5 Tablets by mouth two (2) times a day. Max Daily Amount: 0.5 mg. 10/6/21  Yes Zakiya Lan MD   fluticasone furoate-vilanteroL (Breo Ellipta) 100-25 mcg/dose inhaler Take 1 Puff by inhalation daily.  10/6/21  Yes Zakiya Lan MD   OTHER POC titrate 8/4/21  Yes Chandni Julian MD   diclofenac (VOLTAREN) 1 % gel Apply  to affected area daily. 8/4/21  Yes Chandni Julian MD   acetaminophen (TYLENOL) 500 mg tablet Take 1 Tablet by mouth Every morning. 8/4/21  Yes Chandni Julian MD   cholecalciferol (VITAMIN D3) (2,000 UNITS /50 MCG) cap capsule Take 1 Capsule by mouth daily. Dc colace,protonex and azelastine 8/4/21  Yes Chandni Julian MD   memantine [facility]) 10 mg tablet Take 1 Tablet by mouth two (2) times a day. 7/8/21  Yes Chandni Julian MD   calcium citrate-vitamin d3 (CITRACAL+D) 315 mg-5 mcg (200 unit) tab Take 0.5 Tablets by mouth two (2) times daily (with meals). 7/6/21  Yes Chandni Julian MD   aspirin delayed-release (ECOTRIN LOW STRENGTH) 81 mg tablet Take 1 Tab by mouth daily. 1/17/17  Yes Chandni Julian MD   clonazePAM (KlonoPIN) 0.5 mg tablet Take 0.5 Tablets by mouth two (2) times a day. Max Daily Amount: 0.5 mg. 9/9/21 10/6/21  Chandni Julian MD   fluticasone furoate-vilanteroL (Breo Ellipta) 100-25 mcg/dose inhaler Take 1 Puff by inhalation daily.  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,TIA    Unspecified sleep apnea     NO CPAP       ROS    Objective:     Patient-Reported Vitals 10/6/2021   Patient-Reported Weight 147 lbs   Patient-Reported Height -   Patient-Reported Pulse -   Patient-Reported Temperature -   Patient-Reported Systolic  -   Patient-Reported Diastolic -   Patient-Reported LMP Oophorectomy          Constitutional: [x] Appears well-developed and well-nourished [x] No apparent distress      [] Abnormal -     Mental status: [x] Alert and awake  [x] Oriented to person/place/time [x] Able to follow commands    [] Abnormal -   -     HENT: [x] Normocephalic, atraumatic  [] Abnormal -   [x] Mouth/Throat: Mucous membranes are moist    External Ears [x] Normal  [] Abnormal -    Neck: [x] No visualized mass [] Abnormal -     Pulmonary/Chest: [x] Respiratory effort normal   [x] No visualized signs of difficulty breathing or respiratory distress        [] Abnormal -      Musculoskeletal:   [x] Normal gait with no signs of ataxia         [x] Normal range of motion of neck        [] Abnormal -     Neurological:        [x] No Facial Asymmetry (Cranial nerve 7 motor function) (limited exam due to video visit)          [x] No gaze palsy        [] Abnormal -          Skin:        [x] No significant exanthematous lesions or discoloration noted on facial skin         [] Abnormal -            Psychiatric:       [x] Normal Affect [] Abnormal -   Demented. [x] No Hallucinations    Other pertinent observable physical exam findings:-        We discussed the expected course, resolution and complications of the diagnosis(es) in detail. Medication risks, benefits, costs, interactions, and alternatives were discussed as indicated. I advised her to contact the office if her condition worsens, changes or fails to improve as anticipated. She expressed understanding with the diagnosis(es) and plan. Jarad Morgan, was evaluated through a synchronous (real-time) audio-video encounter. The patient (or guardian if applicable) is aware that this is a billable service. Verbal consent to proceed has been obtained within the past 12 months. The visit was conducted pursuant to the emergency declaration under the [facility], [address] [place] [address] authority and the [employer] General Act. Patient identification was verified, and a caregiver was present when appropriate. The patient was located in a state where the provider was credentialed to provide care.       Chinyere Calzada MD

## 2021-10-06 NOTE — PROGRESS NOTES
Health Maintenance Due   Topic Date Due    DTaP/Tdap/Td series (1 - Tdap) Never done    Shingrix Vaccine Age 50> (1 of 2) Never done    Flu Vaccine (1) 09/01/2021       Chief Complaint   Patient presents with    COPD    Knee Pain    Vitamin D Deficiency       1. Have you been to the ER, urgent care clinic since your last visit? Hospitalized since your last visit? No    2. Have you seen or consulted any other health care providers outside of the [address] [place] [address] since your last visit? Include any pap smears or colon screening. No    3) Do you have an Advance Directive on file? no    4) Are you interested in receiving information on Advance Directives? NO      Patient is accompanied by daughter I have received verbal consent from Serge Diehl to discuss any/all medical information while they are present in the room.

## 2021-10-26 ENCOUNTER — TELEPHONE (OUTPATIENT)
Dept: INTERNAL MEDICINE CLINIC | Age: 86
End: 2021-10-26

## 2021-11-11 ENCOUNTER — TELEPHONE (OUTPATIENT)
Dept: INTERNAL MEDICINE CLINIC | Age: 86
End: 2021-11-11

## 2021-11-11 DIAGNOSIS — N39.0 URINARY TRACT INFECTION WITHOUT HEMATURIA, SITE UNSPECIFIED: Primary | ICD-10-CM

## 2021-11-11 RX ORDER — NITROFURANTOIN 25; 75 MG/1; MG/1
100 CAPSULE ORAL 2 TIMES DAILY
Qty: 14 CAPSULE | Refills: 0 | Status: SHIPPED | OUTPATIENT
Start: 2021-11-11 | End: 2021-12-15 | Stop reason: ALTCHOICE

## 2021-11-12 ENCOUNTER — TELEPHONE (OUTPATIENT)
Dept: INTERNAL MEDICINE CLINIC | Age: 86
End: 2021-11-12

## 2021-11-12 NOTE — TELEPHONE ENCOUNTER
S/w Delma from [facility]([phone])for (PT). She asking for the process thur: Caleb. last-ov-10/6/21

## 2021-11-12 NOTE — TELEPHONE ENCOUNTER
Returned call, Shann Spurling faxed over the forms and [facility]

## 2021-12-02 ENCOUNTER — TELEPHONE (OUTPATIENT)
Dept: INTERNAL MEDICINE CLINIC | Age: 86
End: 2021-12-02

## 2021-12-02 DIAGNOSIS — M17.0 PRIMARY OSTEOARTHRITIS OF BOTH KNEES: Primary | ICD-10-CM

## 2021-12-02 DIAGNOSIS — M25.552 PAIN OF LEFT HIP JOINT: ICD-10-CM

## 2021-12-02 RX ORDER — ACETAMINOPHEN 500 MG
500 TABLET ORAL
Qty: 90 TABLET | Refills: 0 | Status: SHIPPED | OUTPATIENT
Start: 2021-12-02

## 2021-12-06 ENCOUNTER — TELEPHONE (OUTPATIENT)
Dept: INTERNAL MEDICINE CLINIC | Age: 86
End: 2021-12-06

## 2021-12-06 DIAGNOSIS — G10 HEREDITARY CHOREA (HCC): ICD-10-CM

## 2021-12-06 NOTE — TELEPHONE ENCOUNTER
An unwitnessed fall occur today around 1:45. pt fell onto her buttock with no seen injury per:[email or web address]. [phone]. last-ov 10/6/21

## 2021-12-08 ENCOUNTER — TELEPHONE (OUTPATIENT)
Dept: INTERNAL MEDICINE CLINIC | Age: 86
End: 2021-12-08

## 2021-12-08 DIAGNOSIS — G10 HEREDITARY CHOREA (HCC): ICD-10-CM

## 2021-12-08 RX ORDER — CLONAZEPAM 0.5 MG/1
0.25 TABLET ORAL 2 TIMES DAILY
Qty: 60 TABLET | Refills: 2 | Status: SHIPPED | OUTPATIENT
Start: 2021-12-08 | End: 2021-12-08 | Stop reason: SDUPTHER

## 2021-12-08 RX ORDER — CLONAZEPAM 0.5 MG/1
0.25 TABLET ORAL 2 TIMES DAILY
Qty: 60 TABLET | Refills: 2 | Status: SHIPPED | OUTPATIENT
Start: 2021-12-08

## 2021-12-15 ENCOUNTER — OFFICE VISIT (OUTPATIENT)
Dept: INTERNAL MEDICINE CLINIC | Age: 86
End: 2021-12-15
Payer: MEDICARE

## 2021-12-15 VITALS
WEIGHT: 164 LBS | HEIGHT: 62 IN | BODY MASS INDEX: 30.18 KG/M2 | TEMPERATURE: 97.7 F | DIASTOLIC BLOOD PRESSURE: 70 MMHG | RESPIRATION RATE: 18 BRPM | OXYGEN SATURATION: 96 % | HEART RATE: 83 BPM | SYSTOLIC BLOOD PRESSURE: 128 MMHG

## 2021-12-15 DIAGNOSIS — E55.9 VITAMIN D DEFICIENCY: Primary | ICD-10-CM

## 2021-12-15 DIAGNOSIS — Z20.1 EXPOSURE TO TB: ICD-10-CM

## 2021-12-15 DIAGNOSIS — J44.9 CHRONIC OBSTRUCTIVE PULMONARY DISEASE, UNSPECIFIED COPD TYPE (HCC): ICD-10-CM

## 2021-12-15 DIAGNOSIS — F02.80 LATE ONSET ALZHEIMER'S DISEASE WITHOUT BEHAVIORAL DISTURBANCE (HCC): ICD-10-CM

## 2021-12-15 DIAGNOSIS — G30.1 LATE ONSET ALZHEIMER'S DISEASE WITHOUT BEHAVIORAL DISTURBANCE (HCC): ICD-10-CM

## 2021-12-15 DIAGNOSIS — G10 HEREDITARY CHOREA (HCC): ICD-10-CM

## 2021-12-15 PROCEDURE — G8510 SCR DEP NEG, NO PLAN REQD: HCPCS | Performed by: INTERNAL MEDICINE

## 2021-12-15 PROCEDURE — 99214 OFFICE O/P EST MOD 30 MIN: CPT | Performed by: INTERNAL MEDICINE

## 2021-12-15 PROCEDURE — G8417 CALC BMI ABV UP PARAM F/U: HCPCS | Performed by: INTERNAL MEDICINE

## 2021-12-15 PROCEDURE — 1090F PRES/ABSN URINE INCON ASSESS: CPT | Performed by: INTERNAL MEDICINE

## 2021-12-15 PROCEDURE — 1101F PT FALLS ASSESS-DOCD LE1/YR: CPT | Performed by: INTERNAL MEDICINE

## 2021-12-15 PROCEDURE — G8536 NO DOC ELDER MAL SCRN: HCPCS | Performed by: INTERNAL MEDICINE

## 2021-12-15 PROCEDURE — G8427 DOCREV CUR MEDS BY ELIG CLIN: HCPCS | Performed by: INTERNAL MEDICINE

## 2021-12-15 RX ORDER — MEMANTINE HYDROCHLORIDE 10 MG/1
10 TABLET ORAL 2 TIMES DAILY
Qty: 180 TABLET | Refills: 1 | Status: SHIPPED | OUTPATIENT
Start: 2021-12-15

## 2021-12-15 NOTE — PROGRESS NOTES
Health Maintenance Due   Topic Date Due    DTaP/Tdap/Td series (1 - Tdap) Never done    Shingrix Vaccine Age 50> (1 of 2) Never done    COVID-19 Vaccine (3 - Booster) 08/12/2021    Flu Vaccine (1) 09/01/2021       Chief Complaint   Patient presents with    Dementia     follow up    COPD     follow up     Sleep Problem     follow up       1. Have you been to the ER, urgent care clinic since your last visit? Hospitalized since your last visit? No    2. Have you seen or consulted any other health care providers outside of the [address] [place] [address] since your last visit? Include any pap smears or colon screening. No    3) Do you have an Advance Directive on file? yes    4) Are you interested in receiving information on Advance Directives? NO      Patient is accompanied by  I have received verbal consent from Serge Diehl to discuss any/all medical information while they are present in the room.

## 2021-12-15 NOTE — PROGRESS NOTES
HISTORY OF PRESENT ILLNESS  Jarad Morgan is a 80 y.o. female here accompanied by her daughter. She is in assisted living. [facility] She is going to be in assisted living place over there. Need to transfer all medical record. She has dementia, taking Namenda, doing well. She is more active days, trying to walk every day. Report knee pain which bothers her a lot. Advised her to use knee brace when she walks. Has hereditary chorea, taking Klonopin. She has refill on that. Has COPD, using Breo Ellipta, refill not needed right now. Use diclofenac gel for knee pain and arthritis. Need lab work. Vaccination up-to-date except Shingrix vaccine, she will take it once she is in [facility]. Dementia   Her past medical history is significant for COPD. COPD    Sleep Problem        Review of Systems   Constitutional: Negative. HENT: Negative. Eyes: Negative. Respiratory: Negative. Cardiovascular: Negative. Gastrointestinal: Negative. Genitourinary: Negative. Musculoskeletal: Negative. Skin: Negative. Neurological: Negative. Endo/Heme/Allergies: Negative. Psychiatric/Behavioral: Positive for memory loss. The patient is nervous/anxious. Physical Exam  Constitutional:       Appearance: Normal appearance. She is obese. HENT:      Head: Normocephalic and atraumatic. Nose: Nose normal.   Cardiovascular:      Rate and Rhythm: Normal rate and regular rhythm. Pulses: Normal pulses. Heart sounds: Normal heart sounds. Pulmonary:      Effort: Pulmonary effort is normal.      Breath sounds: Normal breath sounds. Musculoskeletal:      Cervical back: Normal range of motion and neck supple. Skin:     General: Skin is warm. Neurological:      General: No focal deficit present. Mental Status: She is alert and oriented to person, place, and time. Mental status is at baseline.    Psychiatric:         Mood and Affect: Mood normal.         Behavior: Behavior normal.         Thought Content: Thought content normal.      Comments: Mild to moderate dementia present. Stable. ASSESSMENT and PLAN  Diagnoses and all orders for this visit:    1. Vitamin D deficiency    We will recheck,  -     VITAMIN D, 25 HYDROXY    2. [facility]reditary [facility]a ([facility]  Taking Klonopin every day. Doing well. 3. Late onset Alzheimer's disease without behavioral disturbance (HCC)  Stable dementia. Will refill,  -     memantine (NAMENDA) 10 mg tablet; Take 1 Tablet by mouth two (2) times a day. -     METABOLIC PANEL, COMPREHENSIVE    4. Chronic obstructive pulmonary disease, unspecified COPD type ([facility]    Using [facility], doing well. -     METABOLIC PANEL, COMPREHENSIVE    5. Exposure to TB    We will order,  -     QUANTIFERON-TB PLUS(CLIENT INCUB.)  We will fill out form for her assisted living. Discussed expected course/resolution/complications of diagnosis in detail with patient. Medication risks/benefits/costs/interactions/alternatives discussed with patient. Discussed COVID-19 infection precaution with patient. Pt was given an after visit summary which includes diagnoses, current medications & vitals. Pt expressed understanding with the diagnosis and plan.

## 2021-12-16 DIAGNOSIS — I63.30 CEREBRAL THROMBOSIS WITH CEREBRAL INFARCTION (HCC): Primary | ICD-10-CM

## 2021-12-16 DIAGNOSIS — G10 HEREDITARY CHOREA (HCC): ICD-10-CM

## 2021-12-16 DIAGNOSIS — R26.9 GAIT ABNORMALITY: ICD-10-CM

## 2021-12-16 LAB
25(OH)D3+25(OH)D2 SERPL-MCNC: 24 NG/ML (ref 30–100)
ALBUMIN SERPL-MCNC: 3.7 G/DL (ref 3.5–4.6)
ALBUMIN/GLOB SERPL: 1.2 {RATIO} (ref 1.2–2.2)
ALP SERPL-CCNC: 84 IU/L (ref 44–121)
ALT SERPL-CCNC: 14 IU/L (ref 0–32)
AST SERPL-CCNC: 21 IU/L (ref 0–40)
BILIRUB SERPL-MCNC: 0.4 MG/DL (ref 0–1.2)
BUN SERPL-MCNC: 17 MG/DL (ref 10–36)
BUN/CREAT SERPL: 15 (ref 12–28)
CALCIUM SERPL-MCNC: 9.2 MG/DL (ref 8.7–10.3)
CHLORIDE SERPL-SCNC: 106 MMOL/L (ref 96–106)
CO2 SERPL-SCNC: 27 MMOL/L (ref 20–29)
CREAT SERPL-MCNC: 1.11 MG/DL (ref 0.57–1)
GLOBULIN SER CALC-MCNC: 3.1 G/DL (ref 1.5–4.5)
GLUCOSE SERPL-MCNC: 88 MG/DL (ref 65–99)
INTERPRETATION: NORMAL
POTASSIUM SERPL-SCNC: 4.1 MMOL/L (ref 3.5–5.2)
PROT SERPL-MCNC: 6.8 G/DL (ref 6–8.5)
SODIUM SERPL-SCNC: 145 MMOL/L (ref 134–144)

## 2021-12-17 LAB
GAMMA INTERFERON BACKGROUND BLD IA-ACNC: 0.03 IU/ML
M TB IFN-G BLD-IMP: ABNORMAL
M TB IFN-G CD4+ BCKGRND COR BLD-ACNC: 0.03 IU/ML
MITOGEN IGNF BLD-ACNC: 0.12 IU/ML
QUANTIFERON TB2 AG: 0.02 IU/ML
SERVICE CMNT-IMP: ABNORMAL

## 2021-12-21 ENCOUNTER — TELEPHONE (OUTPATIENT)
Dept: INTERNAL MEDICINE CLINIC | Age: 86
End: 2021-12-21

## 2021-12-21 DIAGNOSIS — Z11.1 SCREENING-PULMONARY TB: Primary | ICD-10-CM

## 2021-12-21 NOTE — TELEPHONE ENCOUNTER
----- Message from [facility], NP sent at 12/21/2021  1:38 PM EST -----  Sodium mildly elevated. Encourage oral water intake, as tolerated. Vitamin D level is low. Continue OTC vitamin D3 2000 units po daily. Quantiferon TB screen is indeterminate. Will need chest x-ray for TB screen.

## 2021-12-21 NOTE — PROGRESS NOTES
Sodium mildly elevated. Encourage oral water intake, as tolerated. Vitamin D level is low. Continue OTC vitamin D3 2000 units po daily. Quantiferon TB screen is indeterminate. Will need chest x-ray for TB screen.

## 2022-03-18 PROBLEM — K92.2 GIB (GASTROINTESTINAL BLEEDING): Status: ACTIVE | Noted: 2020-10-03

## 2022-03-19 PROBLEM — G10: Status: ACTIVE | Noted: 2017-01-17

## 2022-03-19 PROBLEM — F02.80 LATE ONSET ALZHEIMER'S DISEASE WITHOUT BEHAVIORAL DISTURBANCE (HCC): Status: ACTIVE | Noted: 2017-06-20

## 2022-03-19 PROBLEM — G30.1 LATE ONSET ALZHEIMER'S DISEASE WITHOUT BEHAVIORAL DISTURBANCE (HCC): Status: ACTIVE | Noted: 2017-06-20

## 2022-03-19 PROBLEM — A41.9 SEPSIS (HCC): Status: ACTIVE | Noted: 2021-02-20

## 2023-08-03 ENCOUNTER — TELEPHONE (OUTPATIENT)
Age: 88
End: 2023-08-03

## 2023-08-03 NOTE — TELEPHONE ENCOUNTER
----- Message from Sandra Greene MA sent at 8/3/2023  2:52 PM EDT -----  Subject: Message to Provider    QUESTIONS  Information for Provider? Lizeth  with [employer] with robert. She has been trying to reach the pt to schedule a hospital follow up for   bacterial pneumonia and she thinks pt was discharged to hospice but she   has been unable to reach the pt vm is full. Lizeth's direct line is   [phone].  ---------------------------------------------------------------------------  --------------  Yamilka Best INFO  [phone]; OK to leave message on voicemail  ---------------------------------------------------------------------------  --------------  SCRIPT ANSWERS  Relationship to Patient? Covered Entity  Covered Entity Type? Health Insurance?   Representative Name? Di Meléndez

## 2023-08-03 NOTE — TELEPHONE ENCOUNTER
Sujey Winter RN  of [facility] was called with no answer, message on  left to call back regarding note below.

## 2024-02-17 NOTE — ED TRIAGE NOTES
Patient arrives via EMS in [place] for, \"she rolled out of her bed in her sleep and hit her head. C/o left sided head pain\". Pt denies LOC or pain elsewhere.  88-90% RA, hx of COPD
70

## 2025-05-25 NOTE — TELEPHONE ENCOUNTER
Call placed to [facility] and verbal order from provider to continue with PT for patient safety
Neil Mata from [facility] [employer] called stating that patient's insurance will no longer cover her at home physical therapy because she is no longer in need of it. Neil Mata said that the physical therapist evaluated the patient and feel that she still needs therapy. Neli Mata wants to know if [place] wants them to continue physical therapy.  Please call Neil Mata back at [phone]
Opt out